# Patient Record
Sex: MALE | Race: WHITE | NOT HISPANIC OR LATINO | URBAN - METROPOLITAN AREA
[De-identification: names, ages, dates, MRNs, and addresses within clinical notes are randomized per-mention and may not be internally consistent; named-entity substitution may affect disease eponyms.]

---

## 2017-08-12 ENCOUNTER — INPATIENT (INPATIENT)
Facility: HOSPITAL | Age: 67
LOS: 15 days | Discharge: ANOTHER HOSPITAL | End: 2017-08-28
Attending: INTERNAL MEDICINE | Admitting: INTERNAL MEDICINE
Payer: COMMERCIAL

## 2017-08-12 VITALS
DIASTOLIC BLOOD PRESSURE: 73 MMHG | SYSTOLIC BLOOD PRESSURE: 126 MMHG | RESPIRATION RATE: 20 BRPM | OXYGEN SATURATION: 98 % | HEIGHT: 72 IN | HEART RATE: 128 BPM | TEMPERATURE: 104 F | WEIGHT: 184.09 LBS

## 2017-08-12 DIAGNOSIS — E11.9 TYPE 2 DIABETES MELLITUS WITHOUT COMPLICATIONS: ICD-10-CM

## 2017-08-12 DIAGNOSIS — R10.30 LOWER ABDOMINAL PAIN, UNSPECIFIED: ICD-10-CM

## 2017-08-12 DIAGNOSIS — A41.9 SEPSIS, UNSPECIFIED ORGANISM: ICD-10-CM

## 2017-08-12 DIAGNOSIS — I10 ESSENTIAL (PRIMARY) HYPERTENSION: ICD-10-CM

## 2017-08-12 DIAGNOSIS — I48.91 UNSPECIFIED ATRIAL FIBRILLATION: ICD-10-CM

## 2017-08-12 LAB
ALBUMIN SERPL ELPH-MCNC: 3.4 G/DL — SIGNIFICANT CHANGE UP (ref 3.3–5)
ALP SERPL-CCNC: 72 U/L — SIGNIFICANT CHANGE UP (ref 40–120)
ALT FLD-CCNC: 46 U/L — SIGNIFICANT CHANGE UP (ref 12–78)
ANION GAP SERPL CALC-SCNC: 11 MMOL/L — SIGNIFICANT CHANGE UP (ref 5–17)
APPEARANCE UR: CLEAR — SIGNIFICANT CHANGE UP
APTT BLD: 30.3 SEC — SIGNIFICANT CHANGE UP (ref 27.5–37.4)
AST SERPL-CCNC: 40 U/L — HIGH (ref 15–37)
BACTERIA # UR AUTO: ABNORMAL
BASOPHILS # BLD AUTO: 0.2 K/UL — SIGNIFICANT CHANGE UP (ref 0–0.2)
BASOPHILS NFR BLD AUTO: 1.7 % — SIGNIFICANT CHANGE UP (ref 0–2)
BILIRUB SERPL-MCNC: 1.1 MG/DL — SIGNIFICANT CHANGE UP (ref 0.2–1.2)
BILIRUB UR-MCNC: NEGATIVE — SIGNIFICANT CHANGE UP
BLD GP AB SCN SERPL QL: SIGNIFICANT CHANGE UP
BUN SERPL-MCNC: 23 MG/DL — SIGNIFICANT CHANGE UP (ref 7–23)
CALCIUM SERPL-MCNC: 8.7 MG/DL — SIGNIFICANT CHANGE UP (ref 8.5–10.1)
CHLORIDE SERPL-SCNC: 102 MMOL/L — SIGNIFICANT CHANGE UP (ref 96–108)
CO2 SERPL-SCNC: 23 MMOL/L — SIGNIFICANT CHANGE UP (ref 22–31)
COLOR SPEC: YELLOW — SIGNIFICANT CHANGE UP
CREAT SERPL-MCNC: 1.61 MG/DL — HIGH (ref 0.5–1.3)
DIFF PNL FLD: ABNORMAL
EOSINOPHIL # BLD AUTO: 0 K/UL — SIGNIFICANT CHANGE UP (ref 0–0.5)
EOSINOPHIL NFR BLD AUTO: 0 % — SIGNIFICANT CHANGE UP (ref 0–6)
EPI CELLS # UR: SIGNIFICANT CHANGE UP
GLUCOSE SERPL-MCNC: 188 MG/DL — HIGH (ref 70–99)
GLUCOSE UR QL: NEGATIVE MG/DL — SIGNIFICANT CHANGE UP
HCT VFR BLD CALC: 45.6 % — SIGNIFICANT CHANGE UP (ref 39–50)
HGB BLD-MCNC: 15.6 G/DL — SIGNIFICANT CHANGE UP (ref 13–17)
HYALINE CASTS # UR AUTO: ABNORMAL /LPF
INR BLD: 1.24 RATIO — HIGH (ref 0.88–1.16)
KETONES UR-MCNC: ABNORMAL
LACTATE SERPL-SCNC: 1.6 MMOL/L — SIGNIFICANT CHANGE UP (ref 0.7–2)
LACTATE SERPL-SCNC: 2.9 MMOL/L — HIGH (ref 0.7–2)
LEUKOCYTE ESTERASE UR-ACNC: ABNORMAL
LIDOCAIN IGE QN: 236 U/L — SIGNIFICANT CHANGE UP (ref 73–393)
LYMPHOCYTES # BLD AUTO: 1 K/UL — SIGNIFICANT CHANGE UP (ref 1–3.3)
LYMPHOCYTES # BLD AUTO: 9.3 % — LOW (ref 13–44)
MCHC RBC-ENTMCNC: 31.2 PG — SIGNIFICANT CHANGE UP (ref 27–34)
MCHC RBC-ENTMCNC: 34.2 GM/DL — SIGNIFICANT CHANGE UP (ref 32–36)
MCV RBC AUTO: 91.2 FL — SIGNIFICANT CHANGE UP (ref 80–100)
MONOCYTES # BLD AUTO: 0.9 K/UL — SIGNIFICANT CHANGE UP (ref 0–0.9)
MONOCYTES NFR BLD AUTO: 8.1 % — SIGNIFICANT CHANGE UP (ref 2–14)
NEUTROPHILS # BLD AUTO: 9 K/UL — HIGH (ref 1.8–7.4)
NEUTROPHILS NFR BLD AUTO: 80.8 % — HIGH (ref 43–77)
NITRITE UR-MCNC: NEGATIVE — SIGNIFICANT CHANGE UP
PH UR: 6 — SIGNIFICANT CHANGE UP (ref 5–8)
PLATELET # BLD AUTO: 234 K/UL — SIGNIFICANT CHANGE UP (ref 150–400)
POTASSIUM SERPL-MCNC: 4.2 MMOL/L — SIGNIFICANT CHANGE UP (ref 3.5–5.3)
POTASSIUM SERPL-SCNC: 4.2 MMOL/L — SIGNIFICANT CHANGE UP (ref 3.5–5.3)
PROT SERPL-MCNC: 7.7 GM/DL — SIGNIFICANT CHANGE UP (ref 6–8.3)
PROT UR-MCNC: 100 MG/DL
PROTHROM AB SERPL-ACNC: 13.6 SEC — HIGH (ref 9.8–12.7)
RBC # BLD: 5 M/UL — SIGNIFICANT CHANGE UP (ref 4.2–5.8)
RBC # FLD: 11.8 % — SIGNIFICANT CHANGE UP (ref 11–15)
RBC CASTS # UR COMP ASSIST: ABNORMAL /HPF (ref 0–4)
SODIUM SERPL-SCNC: 136 MMOL/L — SIGNIFICANT CHANGE UP (ref 135–145)
SP GR SPEC: 1.01 — SIGNIFICANT CHANGE UP (ref 1.01–1.02)
TROPONIN I SERPL-MCNC: 0.08 NG/ML — HIGH (ref 0.01–0.04)
TSH SERPL-MCNC: 0.92 UU/ML — SIGNIFICANT CHANGE UP (ref 0.36–3.74)
UROBILINOGEN FLD QL: 4 MG/DL
WBC # BLD: 11.1 K/UL — HIGH (ref 3.8–10.5)
WBC # FLD AUTO: 11.1 K/UL — HIGH (ref 3.8–10.5)
WBC UR QL: SIGNIFICANT CHANGE UP

## 2017-08-12 PROCEDURE — 71010: CPT | Mod: 26

## 2017-08-12 PROCEDURE — 93010 ELECTROCARDIOGRAM REPORT: CPT | Mod: 76

## 2017-08-12 PROCEDURE — 99284 EMERGENCY DEPT VISIT MOD MDM: CPT

## 2017-08-12 PROCEDURE — 74177 CT ABD & PELVIS W/CONTRAST: CPT | Mod: 26

## 2017-08-12 RX ORDER — HEPARIN SODIUM 5000 [USP'U]/ML
INJECTION INTRAVENOUS; SUBCUTANEOUS
Qty: 25000 | Refills: 0 | Status: DISCONTINUED | OUTPATIENT
Start: 2017-08-12 | End: 2017-08-16

## 2017-08-12 RX ORDER — DEXTROSE 50 % IN WATER 50 %
1 SYRINGE (ML) INTRAVENOUS ONCE
Qty: 0 | Refills: 0 | Status: DISCONTINUED | OUTPATIENT
Start: 2017-08-12 | End: 2017-08-28

## 2017-08-12 RX ORDER — PIPERACILLIN AND TAZOBACTAM 4; .5 G/20ML; G/20ML
3.38 INJECTION, POWDER, LYOPHILIZED, FOR SOLUTION INTRAVENOUS EVERY 8 HOURS
Qty: 0 | Refills: 0 | Status: DISCONTINUED | OUTPATIENT
Start: 2017-08-12 | End: 2017-08-13

## 2017-08-12 RX ORDER — DEXTROSE 50 % IN WATER 50 %
12.5 SYRINGE (ML) INTRAVENOUS ONCE
Qty: 0 | Refills: 0 | Status: DISCONTINUED | OUTPATIENT
Start: 2017-08-12 | End: 2017-08-28

## 2017-08-12 RX ORDER — GLUCAGON INJECTION, SOLUTION 0.5 MG/.1ML
1 INJECTION, SOLUTION SUBCUTANEOUS ONCE
Qty: 0 | Refills: 0 | Status: DISCONTINUED | OUTPATIENT
Start: 2017-08-12 | End: 2017-08-28

## 2017-08-12 RX ORDER — PIPERACILLIN AND TAZOBACTAM 4; .5 G/20ML; G/20ML
3.38 INJECTION, POWDER, LYOPHILIZED, FOR SOLUTION INTRAVENOUS ONCE
Qty: 0 | Refills: 0 | Status: COMPLETED | OUTPATIENT
Start: 2017-08-12 | End: 2017-08-12

## 2017-08-12 RX ORDER — SODIUM CHLORIDE 9 MG/ML
1000 INJECTION INTRAMUSCULAR; INTRAVENOUS; SUBCUTANEOUS ONCE
Qty: 0 | Refills: 0 | Status: COMPLETED | OUTPATIENT
Start: 2017-08-12 | End: 2017-08-12

## 2017-08-12 RX ORDER — ATENOLOL 25 MG/1
50 TABLET ORAL DAILY
Qty: 0 | Refills: 0 | Status: DISCONTINUED | OUTPATIENT
Start: 2017-08-12 | End: 2017-08-28

## 2017-08-12 RX ORDER — IOHEXOL 300 MG/ML
30 INJECTION, SOLUTION INTRAVENOUS ONCE
Qty: 0 | Refills: 0 | Status: COMPLETED | OUTPATIENT
Start: 2017-08-12 | End: 2017-08-12

## 2017-08-12 RX ORDER — HEPARIN SODIUM 5000 [USP'U]/ML
3000 INJECTION INTRAVENOUS; SUBCUTANEOUS EVERY 6 HOURS
Qty: 0 | Refills: 0 | Status: DISCONTINUED | OUTPATIENT
Start: 2017-08-12 | End: 2017-08-16

## 2017-08-12 RX ORDER — BUPROPION HYDROCHLORIDE 150 MG/1
150 TABLET, EXTENDED RELEASE ORAL DAILY
Qty: 0 | Refills: 0 | Status: DISCONTINUED | OUTPATIENT
Start: 2017-08-12 | End: 2017-08-14

## 2017-08-12 RX ORDER — VANCOMYCIN HCL 1 G
1000 VIAL (EA) INTRAVENOUS ONCE
Qty: 0 | Refills: 0 | Status: COMPLETED | OUTPATIENT
Start: 2017-08-12 | End: 2017-08-12

## 2017-08-12 RX ORDER — ASPIRIN/CALCIUM CARB/MAGNESIUM 324 MG
81 TABLET ORAL DAILY
Qty: 0 | Refills: 0 | Status: DISCONTINUED | OUTPATIENT
Start: 2017-08-13 | End: 2017-08-28

## 2017-08-12 RX ORDER — HEPARIN SODIUM 5000 [USP'U]/ML
6500 INJECTION INTRAVENOUS; SUBCUTANEOUS EVERY 6 HOURS
Qty: 0 | Refills: 0 | Status: DISCONTINUED | OUTPATIENT
Start: 2017-08-12 | End: 2017-08-16

## 2017-08-12 RX ORDER — ACETAMINOPHEN 500 MG
975 TABLET ORAL ONCE
Qty: 0 | Refills: 0 | Status: COMPLETED | OUTPATIENT
Start: 2017-08-12 | End: 2017-08-12

## 2017-08-12 RX ORDER — DEXTROSE 50 % IN WATER 50 %
25 SYRINGE (ML) INTRAVENOUS ONCE
Qty: 0 | Refills: 0 | Status: DISCONTINUED | OUTPATIENT
Start: 2017-08-12 | End: 2017-08-28

## 2017-08-12 RX ORDER — HEPARIN SODIUM 5000 [USP'U]/ML
6500 INJECTION INTRAVENOUS; SUBCUTANEOUS ONCE
Qty: 0 | Refills: 0 | Status: COMPLETED | OUTPATIENT
Start: 2017-08-12 | End: 2017-08-12

## 2017-08-12 RX ORDER — ASPIRIN/CALCIUM CARB/MAGNESIUM 324 MG
325 TABLET ORAL ONCE
Qty: 0 | Refills: 0 | Status: COMPLETED | OUTPATIENT
Start: 2017-08-12 | End: 2017-08-12

## 2017-08-12 RX ORDER — SODIUM CHLORIDE 9 MG/ML
1000 INJECTION, SOLUTION INTRAVENOUS
Qty: 0 | Refills: 0 | Status: DISCONTINUED | OUTPATIENT
Start: 2017-08-12 | End: 2017-08-28

## 2017-08-12 RX ORDER — SODIUM CHLORIDE 9 MG/ML
1000 INJECTION INTRAMUSCULAR; INTRAVENOUS; SUBCUTANEOUS
Qty: 0 | Refills: 0 | Status: DISCONTINUED | OUTPATIENT
Start: 2017-08-12 | End: 2017-08-19

## 2017-08-12 RX ORDER — INSULIN LISPRO 100/ML
VIAL (ML) SUBCUTANEOUS
Qty: 0 | Refills: 0 | Status: DISCONTINUED | OUTPATIENT
Start: 2017-08-12 | End: 2017-08-28

## 2017-08-12 RX ADMIN — SODIUM CHLORIDE 2000 MILLILITER(S): 9 INJECTION INTRAMUSCULAR; INTRAVENOUS; SUBCUTANEOUS at 10:06

## 2017-08-12 RX ADMIN — IOHEXOL 30 MILLILITER(S): 300 INJECTION, SOLUTION INTRAVENOUS at 10:43

## 2017-08-12 RX ADMIN — Medication 325 MILLIGRAM(S): at 15:43

## 2017-08-12 RX ADMIN — PIPERACILLIN AND TAZOBACTAM 25 GRAM(S): 4; .5 INJECTION, POWDER, LYOPHILIZED, FOR SOLUTION INTRAVENOUS at 22:46

## 2017-08-12 RX ADMIN — Medication 975 MILLIGRAM(S): at 15:35

## 2017-08-12 RX ADMIN — Medication 975 MILLIGRAM(S): at 10:05

## 2017-08-12 RX ADMIN — PIPERACILLIN AND TAZOBACTAM 200 GRAM(S): 4; .5 INJECTION, POWDER, LYOPHILIZED, FOR SOLUTION INTRAVENOUS at 10:43

## 2017-08-12 RX ADMIN — HEPARIN SODIUM 6500 UNIT(S): 5000 INJECTION INTRAVENOUS; SUBCUTANEOUS at 22:46

## 2017-08-12 RX ADMIN — Medication 250 MILLIGRAM(S): at 11:15

## 2017-08-12 RX ADMIN — HEPARIN SODIUM 1500 UNIT(S)/HR: 5000 INJECTION INTRAVENOUS; SUBCUTANEOUS at 22:44

## 2017-08-12 NOTE — ED PROVIDER NOTE - MEDICAL DECISION MAKING DETAILS
65 y/o M w fever/abd pain; sepsis alert; started on broad spectrum abx, IV fluids, ua/culture, CXR, lactate, CTAP to r/o intraabdominal source; also new onset of a.fib; will treat underlying infection/fluids to control heart rate, if still tachy, will give meds for rate control; 67 y/o M w fever/abd pain; sepsis alert; started on broad spectrum abx, IV fluids, ua/culture, CXR, lactate, CTAP to r/o intraabdominal source; also new onset of a.fib; will treat underlying infection/fluids to control heart rate, if still tachy, will give meds for rate control;    no clear source; diltiazem for rate control; ASA;

## 2017-08-12 NOTE — ED ADULT NURSE NOTE - OBJECTIVE STATEMENT
pt received alert and oriented x3, pt states he was having abdominal pain for approx 1 week, with fever and chills , pt sent from urgent care. pt currently denies abdominal pain now

## 2017-08-12 NOTE — H&P ADULT - HISTORY OF PRESENT ILLNESS
67 y/o M w hx of DM, htn, presents w fever and abd pain x 1 week; reports the pain is in bilateral lower quadrant; denies vomiting; denies diarrhea; denies urinary symptoms; + cough; denies CP/SOB; denies lower ext swelling; no headache or neck stiffness

## 2017-08-12 NOTE — ED PROVIDER NOTE - OBJECTIVE STATEMENT
Pertinent PMH/PSH/FHx/SHx and Review of Systems contained within:    67 y/o M w hx of DM, htn, presents w fever and abd pain x 1 week; reports the pain is in bilateral lower quadrant; denies vomiting; denies diarrhea; denies urinary symptoms; + cough; denies CP/SOB; denies lower ext swelling; no headache or neck stiffness    PMH: as above  meds: telmesartan; wellbutryin  allergies; nkda  SH; denies cig or drug use    EKG: a.fib 108

## 2017-08-12 NOTE — H&P ADULT - NSHPREVIEWOFSYSTEMS_GEN_ALL_CORE
REVIEW OF SYSTEMS:    CONSTITUTIONAL: No fever, weight loss, or fatigue  EYES: No eye pain, visual disturbances, or discharge  ENMT:  No difficulty hearing, tinnitus, vertigo; No sinus or throat pain  NECK: No pain or stiffness  BREASTS: No pain, masses, or nipple discharge  RESPIRATORY: No cough, wheezing, chills or hemoptysis; No shortness of breath  CARDIOVASCULAR: No chest pain, palpitations, dizziness, or leg swelling  GASTROINTESTINAL: Abdominal or epigastric pain. No nausea, vomiting, or hematemesis; No diarrhea or constipation. No melena or hematochezia.  GENITOURINARY: No dysuria, frequency, hematuria, or incontinence  NEUROLOGICAL: No headaches, memory loss, loss of strength, numbness, or tremors  SKIN: No itching, burning, rashes, or lesions   LYMPH NODES: No enlarged glands  ENDOCRINE: No heat or cold intolerance; No hair loss  MUSCULOSKELETAL: No joint pain or swelling; No muscle, back, or extremity pain  PSYCHIATRIC: No depression, anxiety, mood swings, or difficulty sleeping  HEME/LYMPH: No easy bruising, or bleeding gums  ALLERGY AND IMMUNOLOGIC: No hives or eczema

## 2017-08-12 NOTE — ED ADULT TRIAGE NOTE - CHIEF COMPLAINT QUOTE
Referred from urgent care center for fever of 102 degree F  and c/o abdominal pain   "right above the belly button" with nausea  x 1 week , getting worse

## 2017-08-12 NOTE — ED PROVIDER NOTE - PHYSICAL EXAMINATION
Gen: no acute distress  HEENT: clear oropharynx; EOMI; dry mucous membranes  neck: no nuchal rigidity  CV: irregular; no murmur  lungs: CTAB; no w/r/r  abd: soft, nd, bilateral lower abd ttp; RLQ > LLQ; no rebound or guarding; no cva ttp  ext: wwp; full ROM  neuro: no focal deficits  skin; no rash

## 2017-08-12 NOTE — H&P ADULT - NSHPLABSRESULTS_GEN_ALL_CORE
CBC Full  -  ( 12 Aug 2017 10:51 )  WBC Count : 11.1 K/uL  Hemoglobin : 15.6 g/dL  Hematocrit : 45.6 %  Platelet Count - Automated : 234 K/uL  Mean Cell Volume : 91.2 fl  Mean Cell Hemoglobin : 31.2 pg  Mean Cell Hemoglobin Concentration : 34.2 gm/dL  Auto Neutrophil # : 9.0 K/uL  Auto Lymphocyte # : 1.0 K/uL  Auto Monocyte # : 0.9 K/uL  Auto Eosinophil # : 0.0 K/uL  Auto Basophil # : 0.2 K/uL  Auto Neutrophil % : 80.8 %  Auto Lymphocyte % : 9.3 %  Auto Monocyte % : 8.1 %  Auto Eosinophil % : 0.0 %  Auto Basophil % : 1.7 %  12 Aug 2017 10:51    136    |  102    |  23     ----------------------------<  188    4.2     |  23     |  1.61     Ca    8.7        12 Aug 2017 10:51    TPro  7.7    /  Alb  3.4    /  TBili  1.1    /  DBili  x      /  AST  40     /  ALT  46     /  AlkPhos  72     12 Aug 2017 10:51

## 2017-08-12 NOTE — H&P ADULT - ASSESSMENT
66 year old man with history of hypertension and diabetes mellitus seen in the emergency with and being admitted for fever of one weeks duration and new onset atrial fibrillation and abdominal pain.  Cat scan iof abdomen only showed a non obstructing left intrarenal stone.

## 2017-08-13 DIAGNOSIS — A41.9 SEPSIS, UNSPECIFIED ORGANISM: ICD-10-CM

## 2017-08-13 DIAGNOSIS — I63.9 CEREBRAL INFARCTION, UNSPECIFIED: ICD-10-CM

## 2017-08-13 DIAGNOSIS — I21.4 NON-ST ELEVATION (NSTEMI) MYOCARDIAL INFARCTION: ICD-10-CM

## 2017-08-13 LAB
ALBUMIN SERPL ELPH-MCNC: 2.9 G/DL — LOW (ref 3.3–5)
ALP SERPL-CCNC: 50 U/L — SIGNIFICANT CHANGE UP (ref 40–120)
ALT FLD-CCNC: 43 U/L — SIGNIFICANT CHANGE UP (ref 12–78)
ANION GAP SERPL CALC-SCNC: 11 MMOL/L — SIGNIFICANT CHANGE UP (ref 5–17)
APTT BLD: 128.7 SEC — CRITICAL HIGH (ref 27.5–37.4)
APTT BLD: 53.5 SEC — HIGH (ref 27.5–37.4)
APTT BLD: 57.2 SEC — HIGH (ref 27.5–37.4)
AST SERPL-CCNC: 77 U/L — HIGH (ref 15–37)
BASE EXCESS BLDA CALC-SCNC: -3 MMOL/L — LOW (ref -2–2)
BASOPHILS # BLD AUTO: 0.3 K/UL — HIGH (ref 0–0.2)
BASOPHILS NFR BLD AUTO: 1.5 % — SIGNIFICANT CHANGE UP (ref 0–2)
BILIRUB SERPL-MCNC: 1 MG/DL — SIGNIFICANT CHANGE UP (ref 0.2–1.2)
BLD GP AB SCN SERPL QL: SIGNIFICANT CHANGE UP
BLOOD GAS COMMENTS: SIGNIFICANT CHANGE UP
BLOOD GAS COMMENTS: SIGNIFICANT CHANGE UP
BLOOD GAS SOURCE: SIGNIFICANT CHANGE UP
BUN SERPL-MCNC: 22 MG/DL — SIGNIFICANT CHANGE UP (ref 7–23)
CALCIUM SERPL-MCNC: 7.6 MG/DL — LOW (ref 8.5–10.1)
CHLORIDE SERPL-SCNC: 108 MMOL/L — SIGNIFICANT CHANGE UP (ref 96–108)
CK MB BLD-MCNC: 0.1 % — SIGNIFICANT CHANGE UP (ref 0–3.5)
CK MB CFR SERPL CALC: 5 NG/ML — HIGH (ref 0.5–3.6)
CK SERPL-CCNC: 3828 U/L — HIGH (ref 26–308)
CK SERPL-CCNC: 4163 U/L — HIGH (ref 26–308)
CO2 SERPL-SCNC: 22 MMOL/L — SIGNIFICANT CHANGE UP (ref 22–31)
CREAT SERPL-MCNC: 1.39 MG/DL — HIGH (ref 0.5–1.3)
CULTURE RESULTS: NO GROWTH — SIGNIFICANT CHANGE UP
EOSINOPHIL # BLD AUTO: 0 K/UL — SIGNIFICANT CHANGE UP (ref 0–0.5)
EOSINOPHIL NFR BLD AUTO: 0 % — SIGNIFICANT CHANGE UP (ref 0–6)
GLUCOSE SERPL-MCNC: 137 MG/DL — HIGH (ref 70–99)
HBA1C BLD-MCNC: 6.4 % — HIGH (ref 4–5.6)
HCO3 BLDA-SCNC: 18 MMOL/L — LOW (ref 21–29)
HCT VFR BLD CALC: 35.9 % — LOW (ref 39–50)
HCT VFR BLD CALC: 37.7 % — LOW (ref 39–50)
HGB BLD-MCNC: 12.1 G/DL — LOW (ref 13–17)
HGB BLD-MCNC: 12.6 G/DL — LOW (ref 13–17)
HOROWITZ INDEX BLDA+IHG-RTO: 21 — SIGNIFICANT CHANGE UP
INR BLD: 1.26 RATIO — HIGH (ref 0.88–1.16)
LACTATE SERPL-SCNC: 1.5 MMOL/L — SIGNIFICANT CHANGE UP (ref 0.7–2)
LACTATE SERPL-SCNC: 2.2 MMOL/L — HIGH (ref 0.7–2)
LYMPHOCYTES # BLD AUTO: 0.8 K/UL — LOW (ref 1–3.3)
LYMPHOCYTES # BLD AUTO: 4.3 % — LOW (ref 13–44)
MCHC RBC-ENTMCNC: 30.3 PG — SIGNIFICANT CHANGE UP (ref 27–34)
MCHC RBC-ENTMCNC: 30.4 PG — SIGNIFICANT CHANGE UP (ref 27–34)
MCHC RBC-ENTMCNC: 33.5 GM/DL — SIGNIFICANT CHANGE UP (ref 32–36)
MCHC RBC-ENTMCNC: 33.6 GM/DL — SIGNIFICANT CHANGE UP (ref 32–36)
MCV RBC AUTO: 90.4 FL — SIGNIFICANT CHANGE UP (ref 80–100)
MCV RBC AUTO: 90.5 FL — SIGNIFICANT CHANGE UP (ref 80–100)
MONOCYTES # BLD AUTO: 1.5 K/UL — HIGH (ref 0–0.9)
MONOCYTES NFR BLD AUTO: 8.4 % — SIGNIFICANT CHANGE UP (ref 2–14)
NEUTROPHILS # BLD AUTO: 15.1 K/UL — HIGH (ref 1.8–7.4)
NEUTROPHILS NFR BLD AUTO: 85.8 % — HIGH (ref 43–77)
PCO2 BLDA: 22 MMHG — LOW (ref 32–46)
PH BLD: 7.52 — HIGH (ref 7.35–7.45)
PLATELET # BLD AUTO: 189 K/UL — SIGNIFICANT CHANGE UP (ref 150–400)
PLATELET # BLD AUTO: 208 K/UL — SIGNIFICANT CHANGE UP (ref 150–400)
PO2 BLDA: 76 MMHG — SIGNIFICANT CHANGE UP (ref 74–108)
POTASSIUM SERPL-MCNC: 3.7 MMOL/L — SIGNIFICANT CHANGE UP (ref 3.5–5.3)
POTASSIUM SERPL-SCNC: 3.7 MMOL/L — SIGNIFICANT CHANGE UP (ref 3.5–5.3)
PROT SERPL-MCNC: 6.5 GM/DL — SIGNIFICANT CHANGE UP (ref 6–8.3)
PROTHROM AB SERPL-ACNC: 13.8 SEC — HIGH (ref 9.8–12.7)
RBC # BLD: 3.97 M/UL — LOW (ref 4.2–5.8)
RBC # BLD: 4.17 M/UL — LOW (ref 4.2–5.8)
RBC # FLD: 11.6 % — SIGNIFICANT CHANGE UP (ref 11–15)
RBC # FLD: 11.7 % — SIGNIFICANT CHANGE UP (ref 11–15)
SAO2 % BLDA: 96 % — SIGNIFICANT CHANGE UP (ref 92–96)
SODIUM SERPL-SCNC: 141 MMOL/L — SIGNIFICANT CHANGE UP (ref 135–145)
SPECIMEN SOURCE: SIGNIFICANT CHANGE UP
TROPONIN I SERPL-MCNC: 0.32 NG/ML — HIGH (ref 0.01–0.04)
TROPONIN I SERPL-MCNC: 0.39 NG/ML — HIGH (ref 0.01–0.04)
WBC # BLD: 14.1 K/UL — HIGH (ref 3.8–10.5)
WBC # BLD: 17.6 K/UL — HIGH (ref 3.8–10.5)
WBC # FLD AUTO: 14.1 K/UL — HIGH (ref 3.8–10.5)
WBC # FLD AUTO: 17.6 K/UL — HIGH (ref 3.8–10.5)

## 2017-08-13 PROCEDURE — 70450 CT HEAD/BRAIN W/O DYE: CPT | Mod: 26,59

## 2017-08-13 PROCEDURE — 70498 CT ANGIOGRAPHY NECK: CPT | Mod: 26

## 2017-08-13 PROCEDURE — 70496 CT ANGIOGRAPHY HEAD: CPT | Mod: 26

## 2017-08-13 PROCEDURE — 71010: CPT | Mod: 26

## 2017-08-13 RX ORDER — MEROPENEM 1 G/30ML
1000 INJECTION INTRAVENOUS ONCE
Qty: 0 | Refills: 0 | Status: COMPLETED | OUTPATIENT
Start: 2017-08-13 | End: 2017-08-13

## 2017-08-13 RX ORDER — ALPRAZOLAM 0.25 MG
0.25 TABLET ORAL ONCE
Qty: 0 | Refills: 0 | Status: DISCONTINUED | OUTPATIENT
Start: 2017-08-13 | End: 2017-08-13

## 2017-08-13 RX ORDER — MEROPENEM 1 G/30ML
INJECTION INTRAVENOUS
Qty: 0 | Refills: 0 | Status: DISCONTINUED | OUTPATIENT
Start: 2017-08-13 | End: 2017-08-14

## 2017-08-13 RX ORDER — ACETAMINOPHEN 500 MG
650 TABLET ORAL EVERY 6 HOURS
Qty: 0 | Refills: 0 | Status: DISCONTINUED | OUTPATIENT
Start: 2017-08-13 | End: 2017-08-28

## 2017-08-13 RX ORDER — OXYCODONE AND ACETAMINOPHEN 5; 325 MG/1; MG/1
1 TABLET ORAL EVERY 6 HOURS
Qty: 0 | Refills: 0 | Status: DISCONTINUED | OUTPATIENT
Start: 2017-08-13 | End: 2017-08-13

## 2017-08-13 RX ORDER — ACETAMINOPHEN 500 MG
650 TABLET ORAL ONCE
Qty: 0 | Refills: 0 | Status: COMPLETED | OUTPATIENT
Start: 2017-08-13 | End: 2017-08-13

## 2017-08-13 RX ORDER — LIDOCAINE 4 G/100G
1 CREAM TOPICAL DAILY
Qty: 0 | Refills: 0 | Status: DISCONTINUED | OUTPATIENT
Start: 2017-08-13 | End: 2017-08-28

## 2017-08-13 RX ORDER — MEROPENEM 1 G/30ML
1000 INJECTION INTRAVENOUS EVERY 8 HOURS
Qty: 0 | Refills: 0 | Status: DISCONTINUED | OUTPATIENT
Start: 2017-08-13 | End: 2017-08-14

## 2017-08-13 RX ORDER — VANCOMYCIN HCL 1 G
1000 VIAL (EA) INTRAVENOUS ONCE
Qty: 0 | Refills: 0 | Status: COMPLETED | OUTPATIENT
Start: 2017-08-13 | End: 2017-08-13

## 2017-08-13 RX ORDER — ACETAMINOPHEN 500 MG
325 TABLET ORAL EVERY 4 HOURS
Qty: 0 | Refills: 0 | Status: DISCONTINUED | OUTPATIENT
Start: 2017-08-13 | End: 2017-08-13

## 2017-08-13 RX ORDER — ACETAMINOPHEN 500 MG
325 TABLET ORAL ONCE
Qty: 0 | Refills: 0 | Status: COMPLETED | OUTPATIENT
Start: 2017-08-13 | End: 2017-08-13

## 2017-08-13 RX ORDER — VANCOMYCIN HCL 1 G
1000 VIAL (EA) INTRAVENOUS EVERY 12 HOURS
Qty: 0 | Refills: 0 | Status: DISCONTINUED | OUTPATIENT
Start: 2017-08-14 | End: 2017-08-14

## 2017-08-13 RX ORDER — SODIUM CHLORIDE 9 MG/ML
1000 INJECTION INTRAMUSCULAR; INTRAVENOUS; SUBCUTANEOUS ONCE
Qty: 0 | Refills: 0 | Status: COMPLETED | OUTPATIENT
Start: 2017-08-13 | End: 2017-08-13

## 2017-08-13 RX ADMIN — Medication 250 MILLIGRAM(S): at 15:38

## 2017-08-13 RX ADMIN — OXYCODONE AND ACETAMINOPHEN 1 TABLET(S): 5; 325 TABLET ORAL at 21:51

## 2017-08-13 RX ADMIN — PIPERACILLIN AND TAZOBACTAM 25 GRAM(S): 4; .5 INJECTION, POWDER, LYOPHILIZED, FOR SOLUTION INTRAVENOUS at 06:37

## 2017-08-13 RX ADMIN — MEROPENEM 200 MILLIGRAM(S): 1 INJECTION INTRAVENOUS at 16:40

## 2017-08-13 RX ADMIN — Medication 1: at 08:15

## 2017-08-13 RX ADMIN — LIDOCAINE 1 PATCH: 4 CREAM TOPICAL at 15:38

## 2017-08-13 RX ADMIN — Medication 1: at 11:45

## 2017-08-13 RX ADMIN — Medication 81 MILLIGRAM(S): at 11:47

## 2017-08-13 RX ADMIN — BUPROPION HYDROCHLORIDE 150 MILLIGRAM(S): 150 TABLET, EXTENDED RELEASE ORAL at 05:27

## 2017-08-13 RX ADMIN — HEPARIN SODIUM 3000 UNIT(S): 5000 INJECTION INTRAVENOUS; SUBCUTANEOUS at 23:52

## 2017-08-13 RX ADMIN — SODIUM CHLORIDE 2000 MILLILITER(S): 9 INJECTION INTRAMUSCULAR; INTRAVENOUS; SUBCUTANEOUS at 15:34

## 2017-08-13 RX ADMIN — Medication 325 MILLIGRAM(S): at 16:21

## 2017-08-13 RX ADMIN — Medication 0.25 MILLIGRAM(S): at 17:35

## 2017-08-13 RX ADMIN — HEPARIN SODIUM 1200 UNIT(S)/HR: 5000 INJECTION INTRAVENOUS; SUBCUTANEOUS at 10:22

## 2017-08-13 RX ADMIN — HEPARIN SODIUM 1400 UNIT(S)/HR: 5000 INJECTION INTRAVENOUS; SUBCUTANEOUS at 23:47

## 2017-08-13 RX ADMIN — ATENOLOL 50 MILLIGRAM(S): 25 TABLET ORAL at 05:27

## 2017-08-13 RX ADMIN — HEPARIN SODIUM 0 UNIT(S)/HR: 5000 INJECTION INTRAVENOUS; SUBCUTANEOUS at 08:17

## 2017-08-13 RX ADMIN — Medication 650 MILLIGRAM(S): at 05:15

## 2017-08-13 RX ADMIN — OXYCODONE AND ACETAMINOPHEN 1 TABLET(S): 5; 325 TABLET ORAL at 22:30

## 2017-08-13 RX ADMIN — Medication 1: at 16:21

## 2017-08-13 RX ADMIN — Medication 325 MILLIGRAM(S): at 15:38

## 2017-08-13 RX ADMIN — Medication 650 MILLIGRAM(S): at 04:39

## 2017-08-13 RX ADMIN — MEROPENEM 200 MILLIGRAM(S): 1 INJECTION INTRAVENOUS at 21:47

## 2017-08-13 NOTE — PROGRESS NOTE ADULT - SUBJECTIVE AND OBJECTIVE BOX
INTERVAL HPI/OVERNIGHT EVENTS:    interval events noted.  Neurology and ID notes appreciated. CTA report noted.      Antimicrobial:  meropenem IVPB   IV Intermittent   meropenem IVPB 1000 milliGRAM(s) IV Intermittent every 8 hours    Cardiovascular:  ATENolol  Tablet 50 milliGRAM(s) Oral daily    Pulmonary:    Hematalogic:  aspirin enteric coated 81 milliGRAM(s) Oral daily  heparin  Infusion.  Unit(s)/Hr IV Continuous <Continuous>  heparin  Injectable 6500 Unit(s) IV Push every 6 hours PRN  heparin  Injectable 3000 Unit(s) IV Push every 6 hours PRN    Other:  buPROPion XL . 150 milliGRAM(s) Oral daily  insulin lispro (HumaLOG) corrective regimen sliding scale   SubCutaneous three times a day before meals  dextrose 5%. 1000 milliLiter(s) IV Continuous <Continuous>  dextrose Gel 1 Dose(s) Oral once PRN  dextrose 50% Injectable 12.5 Gram(s) IV Push once  dextrose 50% Injectable 25 Gram(s) IV Push once  dextrose 50% Injectable 25 Gram(s) IV Push once  glucagon  Injectable 1 milliGRAM(s) IntraMuscular once PRN  sodium chloride 0.9%. 1000 milliLiter(s) IV Continuous <Continuous>  oxyCODONE    5 mG/acetaminophen 325 mG 1 Tablet(s) Oral every 6 hours PRN  lidocaine   Patch 1 Patch Transdermal daily  acetaminophen   Tablet 650 milliGRAM(s) Oral every 6 hours PRN    aspirin enteric coated 81 milliGRAM(s) Oral daily  buPROPion XL . 150 milliGRAM(s) Oral daily  ATENolol  Tablet 50 milliGRAM(s) Oral daily  insulin lispro (HumaLOG) corrective regimen sliding scale   SubCutaneous three times a day before meals  dextrose 5%. 1000 milliLiter(s) IV Continuous <Continuous>  dextrose Gel 1 Dose(s) Oral once PRN  dextrose 50% Injectable 12.5 Gram(s) IV Push once  dextrose 50% Injectable 25 Gram(s) IV Push once  dextrose 50% Injectable 25 Gram(s) IV Push once  glucagon  Injectable 1 milliGRAM(s) IntraMuscular once PRN  sodium chloride 0.9%. 1000 milliLiter(s) IV Continuous <Continuous>  heparin  Infusion.  Unit(s)/Hr IV Continuous <Continuous>  heparin  Injectable 6500 Unit(s) IV Push every 6 hours PRN  heparin  Injectable 3000 Unit(s) IV Push every 6 hours PRN  oxyCODONE    5 mG/acetaminophen 325 mG 1 Tablet(s) Oral every 6 hours PRN  lidocaine   Patch 1 Patch Transdermal daily  meropenem IVPB   IV Intermittent   meropenem IVPB 1000 milliGRAM(s) IV Intermittent every 8 hours  acetaminophen   Tablet 650 milliGRAM(s) Oral every 6 hours PRN    Drug Dosing Weight  Height (cm): 182.88 (12 Aug 2017 10:02)  Weight (kg): 83.9 (12 Aug 2017 18:00)  BMI (kg/m2): 25.1 (12 Aug 2017 18:00)  BSA (m2): 2.06 (12 Aug 2017 18:00)        SCALES: [ ] YES [ ] NO    DATE INSERTED:  REMOVE:  [ ] YES [ ] NO  EXPLAIN:      PAST MEDICAL & SURGICAL HISTORY:  Diabetes  HTN (hypertension)      REVIEW OF SYSTEMS:    CONSTITUTIONAL: No fever, weight loss, or fatigue  EYES: No eye pain, visual disturbances, or discharge  ENMT:  No difficulty hearing, tinnitus, vertigo; No sinus or throat pain  NECK: No pain or stiffness  BREASTS: No pain, masses, or nipple discharge  RESPIRATORY: No cough, wheezing, chills or hemoptysis; No shortness of breath  CARDIOVASCULAR: No chest pain, palpitations, dizziness, or leg swelling  GASTROINTESTINAL: No abdominal or epigastric pain. No nausea, vomiting, or hematemesis; No diarrhea or constipation. No melena or hematochezia.  GENITOURINARY: No dysuria, frequency, hematuria, or incontinence  NEUROLOGICAL: No headaches, memory loss, loss of strength, numbness, or tremors  SKIN: No itching, burning, rashes, or lesions   LYMPH NODES: No enlarged glands  ENDOCRINE: No heat or cold intolerance; No hair loss  MUSCULOSKELETAL: No joint pain or swelling; No muscle, back, or extremity pain  PSYCHIATRIC: No depression, anxiety, mood swings, or difficulty sleeping  HEME/LYMPH: No easy bruising, or bleeding gums  ALLERGY AND IMMUNOLOGIC: No hives or eczema      T(C): 37.7 (17 @ 17:05), Max: 37.8 (17 @ 11:47)  HR: 68 (17 @ 17:05) (68 - 87)  BP: 114/66 (17 @ 17:05) (114/66 - 136/68)  RR: 18 (17 @ 17:05) (18 - 18)  SpO2: 98% (17 @ 17:05) (95% - 98%)  Wt(kg): --    ABG - ( 13 Aug 2017 15:22 )  pH: x     /  pCO2: 22    /  pO2: 76    / HCO3: 18    / Base Excess: -3.0  /  SaO2: 96                  I&O's Detail        PHYSICAL EXAM:    GENERAL: NAD, well-groomed, well-developed  HEAD:  Atraumatic, Normocephalic  EYES: EOMI, PERRLA, conjunctiva and sclera clear  ENMT: No tonsillar erythema, exudates, or enlargement; Moist mucous membranes, Good dentition, No lesions  NECK: Supple, No JVD, Normal thyroid  NERVOUS SYSTEM:  Alert & Oriented X3, Good concentration; Motor Strength 5/5 B/L upper and lower extremities; DTRs 2+ intact and symmetric  CHEST/LUNG: Clear to percussion bilaterally; No rales, rhonchi, wheezing, or rubs  HEART: Regular rate and rhythm; No murmurs, rubs, or gallops  ABDOMEN: Soft, Nontender, Nondistended; Bowel sounds present  EXTREMITIES:  2+ Peripheral Pulses, No clubbing, cyanosis, or edema  LYMPH: No lymphadenopathy noted  SKIN: No rashes or lesions      LABS:  CBC Full  -  ( 13 Aug 2017 13:41 )  WBC Count : 17.6 K/uL  Hemoglobin : 12.1 g/dL  Hematocrit : 35.9 %  Platelet Count - Automated : 208 K/uL  Mean Cell Volume : 90.5 fl  Mean Cell Hemoglobin : 30.4 pg  Mean Cell Hemoglobin Concentration : 33.6 gm/dL  Auto Neutrophil # : 15.1 K/uL  Auto Lymphocyte # : 0.8 K/uL  Auto Monocyte # : 1.5 K/uL  Auto Eosinophil # : 0.0 K/uL  Auto Basophil # : 0.3 K/uL  Auto Neutrophil % : 85.8 %  Auto Lymphocyte % : 4.3 %  Auto Monocyte % : 8.4 %  Auto Eosinophil % : 0.0 %  Auto Basophil % : 1.5 %        141  |  108  |  22  ----------------------------<  137<H>  3.7   |  22  |  1.39<H>    Ca    7.6<L>      13 Aug 2017 13:41    TPro  6.5  /  Alb  2.9<L>  /  TBili  1.0  /  DBili  x   /  AST  77<H>  /  ALT  43  /  AlkPhos  50  08-13    PT/INR - ( 13 Aug 2017 13:41 )   PT: 13.8 sec;   INR: 1.26 ratio         PTT - ( 13 Aug 2017 13:41 )  PTT:57.2 sec  Urinalysis Basic - ( 12 Aug 2017 11:09 )    Color: Yellow / Appearance: Clear / S.010 / pH: x  Gluc: x / Ketone: Moderate  / Bili: Negative / Urobili: 4 mg/dL   Blood: x / Protein: 100 mg/dL / Nitrite: Negative   Leuk Esterase: Trace / RBC: 3-5 /HPF / WBC 0-2   Sq Epi: x / Non Sq Epi: x / Bacteria: Moderate          RADIOLOGY & ADDITIONAL STUDIES:

## 2017-08-13 NOTE — CONSULT NOTE ADULT - SUBJECTIVE AND OBJECTIVE BOX
HPI:  67 y/o M w hx of DM, htn, presents w fever and abd pain x 1 week; reports the pain is in bilateral lower quadrant; denies vomiting; denies diarrhea; denies urinary symptoms; + cough; denies CP/SOB; denies lower ext swelling; no headache or neck stiffness (12 Aug 2017 15:51)      PAST MEDICAL & SURGICAL HISTORY:  Diabetes  HTN (hypertension)      SOCHX:   tobacco,  -  alcohol    FMHX: FA/MO  - contributory       Recent Travel:    Immunizations:    Allergies    No Known Allergies    Intolerances        MEDICATIONS  (STANDING):  aspirin enteric coated 81 milliGRAM(s) Oral daily  buPROPion XL . 150 milliGRAM(s) Oral daily  ATENolol  Tablet 50 milliGRAM(s) Oral daily  insulin lispro (HumaLOG) corrective regimen sliding scale   SubCutaneous three times a day before meals  dextrose 5%. 1000 milliLiter(s) (50 mL/Hr) IV Continuous <Continuous>  dextrose 50% Injectable 12.5 Gram(s) IV Push once  dextrose 50% Injectable 25 Gram(s) IV Push once  dextrose 50% Injectable 25 Gram(s) IV Push once  sodium chloride 0.9%. 1000 milliLiter(s) (100 mL/Hr) IV Continuous <Continuous>  heparin  Infusion.  Unit(s)/Hr (15 mL/Hr) IV Continuous <Continuous>  lidocaine   Patch 1 Patch Transdermal daily  meropenem IVPB   IV Intermittent   meropenem IVPB 1000 milliGRAM(s) IV Intermittent every 8 hours    MEDICATIONS  (PRN):  dextrose Gel 1 Dose(s) Oral once PRN Blood Glucose LESS THAN 70 milliGRAM(s)/deciliter  glucagon  Injectable 1 milliGRAM(s) IntraMuscular once PRN Glucose LESS THAN 70 milligrams/deciliter  heparin  Injectable 6500 Unit(s) IV Push every 6 hours PRN For aPTT less than 40  heparin  Injectable 3000 Unit(s) IV Push every 6 hours PRN For aPTT between 40 - 57  oxyCODONE    5 mG/acetaminophen 325 mG 1 Tablet(s) Oral every 6 hours PRN Moderate Pain (4 - 6)  acetaminophen   Tablet 650 milliGRAM(s) Oral every 6 hours PRN For Temp greater than 38 C (100.4 F)      REVIEW OF SYSTEMS:  CONSTITUTIONAL: No fever, weight loss, or fatigue  EYES: No eye pain, visual disturbances, or discharge  ENMT:  No difficulty hearing, tinnitus, vertigo; No sinus or throat pain  NECK: No pain or stiffness  BREASTS: No pain, masses, or nipple discharge  RESPIRATORY: No cough, wheezing, chills or hemoptysis; No shortness of breath  CARDIOVASCULAR: No chest pain, palpitations, dizziness, or leg swelling  GASTROINTESTINAL: No abdominal or epigastric pain. No nausea, vomiting, or hematemesis; No diarrhea or constipation. No melena or hematochezia.  GENITOURINARY: No dysuria, frequency, hematuria, or incontinence  NEUROLOGICAL: No headaches, memory loss, loss of strength, numbness, or tremors  SKIN: No itching, burning, rashes, or lesions   LYMPH NODES: No enlarged glands  ENDOCRINE: No heat or cold intolerance; No hair loss  MUSCULOSKELETAL: No joint pain or swelling; No muscle, back, or extremity pain  PSYCHIATRIC: No depression, anxiety, mood swings, or difficulty sleeping  HEME/LYMPH: No easy bruising, or bleeding gums  ALLERGY AND IMMUNOLOGIC: No hives or eczema    VITAL SIGNS:    T(C): 37.7 (17 @ 17:05), Max: 37.8 (17 @ 11:47)  T(F): 99.8 (17 @ 17:05), Max: 100 (17 @ 11:47)  HR: 68 (17 @ 17:05) (68 - 94)  BP: 114/66 (17 @ 17:05) (114/66 - 136/68)  RR: 18 (17 @ 17:05) (18 - 18)  SpO2: 98% (17 @ 17:05) (95% - 98%)    PHYSICAL EXAM:    GENERAL: NAD, well-groomed, well-developed  HEAD:  Atraumatic, Normocephalic  EYES: EOMI, PERRLA, conjunctiva and sclera clear  ENMT: No tonsillar erythema, exudates, or enlargement; Moist mucous membranes, Good dentition, No lesions  NECK: Supple, No JVD, Normal thyroid  NERVOUS SYSTEM:  Alert & Oriented X3, Good concentration; Motor Strength 5/5 B/L upper and lower extremities; DTRs 2+ intact and symmetric  CHEST/LUNG: Clear to percussion bilaterally; No rales, rhonchi, wheezing bilaterally  HEART: Regular rate and rhythm; No murmurs, rubs, or gallops  ABDOMEN: Soft, Nontender, Nondistended; Bowel sounds present  EXTREMITIES:  2+ Peripheral Pulses, No clubbing, cyanosis, or edema  LYMPH: No lymphadenopathy noted  SKIN: No rashes or lesions  BACK: no pressor sore     LABS:                         12.1   17.6  )-----------( 208      ( 13 Aug 2017 13:41 )             35.9         141  |  108  |  22  ----------------------------<  137<H>  3.7   |  22  |  1.39<H>    Ca    7.6<L>      13 Aug 2017 13:41    TPro  6.5  /  Alb  2.9<L>  /  TBili  1.0  /  DBili  x   /  AST  77<H>  /  ALT  43  /  AlkPhos  50      LIVER FUNCTIONS - ( 13 Aug 2017 13:41 )  Alb: 2.9 g/dL / Pro: 6.5 gm/dL / ALK PHOS: 50 U/L / ALT: 43 U/L / AST: 77 U/L / GGT: x           PT/INR - ( 13 Aug 2017 13:41 )   PT: 13.8 sec;   INR: 1.26 ratio         PTT - ( 13 Aug 2017 13:41 )  PTT:57.2 sec  Urinalysis Basic - ( 12 Aug 2017 11:09 )    Color: Yellow / Appearance: Clear / S.010 / pH: x  Gluc: x / Ketone: Moderate  / Bili: Negative / Urobili: 4 mg/dL   Blood: x / Protein: 100 mg/dL / Nitrite: Negative   Leuk Esterase: Trace / RBC: 3-5 /HPF / WBC 0-2   Sq Epi: x / Non Sq Epi: x / Bacteria: Moderate      ABG - ( 13 Aug 2017 15:22 )  pH: x     /  pCO2: 22    /  pO2: 76    / HCO3: 18    / Base Excess: -3.0  /  SaO2: 96                CARDIAC MARKERS ( 13 Aug 2017 13:41 )  .319 ng/mL / x     / 3828 U/L / x     / 5.0 ng/mL  CARDIAC MARKERS ( 12 Aug 2017 22:04 )  .081 ng/mL / x     / x     / x     / x          Thyroid Stimulating Hormone, Serum: 0.922 uU/mL ( @ 10:51)                              Radiology: 65 y/o M w hx of DM, htn, presents w fever and abd pain x 1 week; reports the pain is in bilateral lower quadrant; denies vomiting; denies diarrhea; denies urinary symptoms; + cough; denies CP/SOB; denies lower ext swelling; no headache or neck stiffness (12 Aug 2017 15:51)  seen and discussed with wife by bedside   for me alert and oriented x3 in nad   has been agiitated and confused before  all of a suddn unable to move left arm and also feels weak left leg     PAST MEDICAL & SURGICAL HISTORY:  Diabetes  HTN (hypertension)  not hospitalized for years     SOCHX:   no tobacco,  no-  alcohol    FMHX: FA/MO  -non contributory       Recent Travel:    Immunizations:    Allergies    No Known Allergies    Intolerances        MEDICATIONS  (STANDING):  aspirin enteric coated 81 milliGRAM(s) Oral daily  buPROPion XL . 150 milliGRAM(s) Oral daily  ATENolol  Tablet 50 milliGRAM(s) Oral daily  insulin lispro (HumaLOG) corrective regimen sliding scale   SubCutaneous three times a day before meals  dextrose 5%. 1000 milliLiter(s) (50 mL/Hr) IV Continuous <Continuous>  dextrose 50% Injectable 12.5 Gram(s) IV Push once  dextrose 50% Injectable 25 Gram(s) IV Push once  dextrose 50% Injectable 25 Gram(s) IV Push once  sodium chloride 0.9%. 1000 milliLiter(s) (100 mL/Hr) IV Continuous <Continuous>  heparin  Infusion.  Unit(s)/Hr (15 mL/Hr) IV Continuous <Continuous>  lidocaine   Patch 1 Patch Transdermal daily  meropenem IVPB   IV Intermittent   meropenem IVPB 1000 milliGRAM(s) IV Intermittent every 8 hours    MEDICATIONS  (PRN):  dextrose Gel 1 Dose(s) Oral once PRN Blood Glucose LESS THAN 70 milliGRAM(s)/deciliter  glucagon  Injectable 1 milliGRAM(s) IntraMuscular once PRN Glucose LESS THAN 70 milligrams/deciliter  heparin  Injectable 6500 Unit(s) IV Push every 6 hours PRN For aPTT less than 40  heparin  Injectable 3000 Unit(s) IV Push every 6 hours PRN For aPTT between 40 - 57  oxyCODONE    5 mG/acetaminophen 325 mG 1 Tablet(s) Oral every 6 hours PRN Moderate Pain (4 - 6)  acetaminophen   Tablet 650 milliGRAM(s) Oral every 6 hours PRN For Temp greater than 38 C (100.4 F)      REVIEW OF SYSTEMS:  CONSTITUTIONAL: sudden onset of fevers   never sick before   no, weight loss, or fatigue  EYES: No eye pain, visual disturbances, or discharge  ENMT:  No difficulty hearing, tinnitus, vertigo; No sinus or throat pain  NECK: No pain or stiffness  BREASTS: No pain, masses, or nipple discharge  RESPIRATORY: No cough, wheezing, chills or hemoptysis; No shortness of breath  CARDIOVASCULAR: No chest pain, palpitations, dizziness, or leg swelling  GASTROINTESTINAL: No abdominal or epigastric pain. No nausea, vomiting, or hematemesis; No diarrhea or constipation. No melena or hematochezia.  GENITOURINARY: No dysuria, frequency, hematuria, or incontinence  NEUROLOGICAL: No headaches, memory loss,  has  loss of strength\in left arrm and a little bit on left leg   no numbness, or tremors  SKIN: No itching, burning, rashes, or lesions   LYMPH NODES: No enlarged glands  ENDOCRINE: No heat or cold intolerance; No hair loss  MUSCULOSKELETAL: No joint pain or swelling; No muscle, back, or extremity pain  PSYCHIATRIC: No depression, anxiety, mood swings, or difficulty sleeping  HEME/LYMPH: No easy bruising, or bleeding gums  ALLERGY AND IMMUNOLOGIC: No hives or eczema    VITAL SIGNS:    T(C): 37.7 (17 @ 17:05), Max: 37.8 (17 @ 11:47)  T(F): 99.8 (17 @ 17:05), Max: 100 (17 @ 11:47)  HR: 68 (17 @ 17:05) (68 - 94)  BP: 114/66 (17 @ 17:05) (114/66 - 136/68)  RR: 18 (17 @ 17:05) (18 - 18)  SpO2: 98% (17 @ 17:05) (95% - 98%)    PHYSICAL EXAM:    GENERAL: NAD, well-groomed, well-developed  HEAD:  Atraumatic, Normocephalic  EYES: EOMI, PERRLA, conjunctiva and sclera clear  ENMT: No tonsillar erythema, exudates, or enlargement; Moist mucous membranes,  NECK: Supple, No JVD, Normal thyroid  NERVOUS SYSTEM:  Alert & Oriented X3, Good concentration; Motor Strength 0-1/5 left arm   3/5 left leg ; rt arm is fine   CHEST/LUNG: Clear t bilaterally; No rales, rhonchi, wheezing bilaterally  HEART: Regular rate and rhythm; systolic ejection  murmurs,   no rubs, or gallops  ABDOMEN: Soft, Nontender, Nondistended; Bowel sounds present  EXTREMITIES:  2+ Peripheral Pulses, No clubbing, cyanosis, or edema  LYMPH: No lymphadenopathy noted  SKIN: No rashes or lesions  BACK: no pressor sore     LABS:                         12.1   17.6  )-----------( 208      ( 13 Aug 2017 13:41 )             35.9     08-13    141  |  108  |  22  ----------------------------<  137<H>  3.7   |  22  |  1.39<H>    Ca    7.6<L>      13 Aug 2017 13:41    TPro  6.5  /  Alb  2.9<L>  /  TBili  1.0  /  DBili  x   /  AST  77<H>  /  ALT  43  /  AlkPhos  50  -13    LIVER FUNCTIONS - ( 13 Aug 2017 13:41 )  Alb: 2.9 g/dL / Pro: 6.5 gm/dL / ALK PHOS: 50 U/L / ALT: 43 U/L / AST: 77 U/L / GGT: x           PT/INR - ( 13 Aug 2017 13:41 )   PT: 13.8 sec;   INR: 1.26 ratio         PTT - ( 13 Aug 2017 13:41 )  PTT:57.2 sec  Urinalysis Basic - ( 12 Aug 2017 11:09 )    Color: Yellow / Appearance: Clear / S.010 / pH: x  Gluc: x / Ketone: Moderate  / Bili: Negative / Urobili: 4 mg/dL   Blood: x / Protein: 100 mg/dL / Nitrite: Negative   Leuk Esterase: Trace / RBC: 3-5 /HPF / WBC 0-2   Sq Epi: x / Non Sq Epi: x / Bacteria: Moderate      ABG - ( 13 Aug 2017 15:22 )  pH: x     /  pCO2: 22    /  pO2: 76    / HCO3: 18    / Base Excess: -3.0  /  SaO2: 96                CARDIAC MARKERS ( 13 Aug 2017 13:41 )  .319 ng/mL / x     / 3828 U/L / x     / 5.0 ng/mL  CARDIAC MARKERS ( 12 Aug 2017 22:04 )  .081 ng/mL / x     / x     / x     / x          Thyroid Stimulating Hormone, Serum: 0.922 uU/mL ( @ 10:51)                              Radiology:      < from: CT Angio Head w/ IV Cont (17 @ 15:03) >  IMPRESSION    Unremarkable CTA study of the head and neck.                JADEN MONTERO M.D., ATTENDING RADIOLOGIST  This document has been electronically signed. Aug 13 2017  2:46PM              < end of copied text >  < from: Xray Chest 1 View AP/PA. (17 @ 13:54) >  MPRESSION:    Clear lungs. No change since 2017.                MAGDIEL TA M.D., ATTENDING RADIOLOGIST  This document has been electronically signed. Aug 13 2017  2:04PM              < end of copied text >  < from: CT Abdomen and Pelvis w/ Oral Cont and w/ IV Cont (17 @ 14:58) >  MPRESSION:    Punctate nonobstructing left intrarenal calculi. Otherwise no acute   findings. Incidental comments as above.                    GRACIELA MCNEILL M.D., ATTENDING RADIOLOGIST  This document has been electronically signed. Aug 12 2017  2:57PM          < end of copied text >

## 2017-08-13 NOTE — CONSULT NOTE ADULT - SUBJECTIVE AND OBJECTIVE BOX
Subjective Complaints:  Historian:       Consult requested by ER doctor:                  Attending: Dr Raymond    HPI:  67 y/o M w hx of DM, htn, presents w fever and abd pain x 1 week; reports the pain is in bilateral lower quadrant; denies vomiting; denies diarrhea; denies urinary symptoms; + cough; denies CP/SOB; denies lower ext swelling; no headache or neck stiffness (12 Aug 2017 15:51)Today patient was found to have a left upper extremity weaknes  and confusion    VANNESADEAN SMITH    PAST MEDICAL & SURGICAL HISTORY:  Diabetes  HTN (hypertension)  66yMale    MEDICATIONS  (STANDING):  aspirin enteric coated 81 milliGRAM(s) Oral daily  buPROPion XL . 150 milliGRAM(s) Oral daily  ATENolol  Tablet 50 milliGRAM(s) Oral daily  insulin lispro (HumaLOG) corrective regimen sliding scale   SubCutaneous three times a day before meals  dextrose 5%. 1000 milliLiter(s) (50 mL/Hr) IV Continuous <Continuous>  dextrose 50% Injectable 12.5 Gram(s) IV Push once  dextrose 50% Injectable 25 Gram(s) IV Push once  dextrose 50% Injectable 25 Gram(s) IV Push once  piperacillin/tazobactam IVPB. 3.375 Gram(s) IV Intermittent every 8 hours  sodium chloride 0.9%. 1000 milliLiter(s) (100 mL/Hr) IV Continuous <Continuous>  heparin  Infusion.  Unit(s)/Hr (15 mL/Hr) IV Continuous <Continuous>  lidocaine   Patch 1 Patch Transdermal daily  vancomycin  IVPB 1000 milliGRAM(s) IV Intermittent once  sodium chloride 0.9% Bolus 1000 milliLiter(s) IV Bolus once    MEDICATIONS  (PRN):  dextrose Gel 1 Dose(s) Oral once PRN Blood Glucose LESS THAN 70 milliGRAM(s)/deciliter  glucagon  Injectable 1 milliGRAM(s) IntraMuscular once PRN Glucose LESS THAN 70 milligrams/deciliter  heparin  Injectable 6500 Unit(s) IV Push every 6 hours PRN For aPTT less than 40  heparin  Injectable 3000 Unit(s) IV Push every 6 hours PRN For aPTT between 40 - 57  oxyCODONE    5 mG/acetaminophen 325 mG 1 Tablet(s) Oral every 6 hours PRN Moderate Pain (4 - 6)  acetaminophen   Tablet 325 milliGRAM(s) Oral every 4 hours PRN For Temp greater than 38 C (100.4 F)      Allergies    No Known Allergies    Intolerances      FAMILY HISTORY:      REVIEW OF SYSTEMS:  General:  No wt loss,low grade  fevers, chills, night sweats  Eyes:  Good vision, no reported pain  ENT:  No sore throat, pain, runny nose, dysphagia  CV:  No pain, palpitatioins, hypo/hypertension  Resp:  No dyspnea, cough, tachypnea, wheezing  GI:  No pain, nausea, vomiting, diarrhea, constipatiion  :  No pain, bleeding, incontinence, nocturia  Muscle:  No pain,left upper extremity weakness  Breast:  No pain, abscess, mass, discharge  Neuro:  No weakness, tingling, memory problems  Psych:  No fatigue, insomnia, mood problems, depression  Endocrine:  No polyuria, polydypsia, cold/heat intolerance  Heme:  No petechiae, ecchymosis, easy bruisability  Skin:  No rash, tattoos, scars, edema      Vital Signs Last 24 Hrs  T(C): 37.8 (13 Aug 2017 11:47), Max: 37.9 (12 Aug 2017 15:25)  T(F): 100 (13 Aug 2017 11:47), Max: 100.3 (12 Aug 2017 15:25)  HR: 72 (13 Aug 2017 11:47) (72 - 97)  BP: 136/68 (13 Aug 2017 11:47) (120/69 - 140/78)  BP(mean): --  RR: 18 (13 Aug 2017 11:47) (18 - 22)  SpO2: 95% (13 Aug 2017 11:47) (95% - 98%)    GENERAL PHYSICAL EXAM:  General:  Appears stated age, well-groomed, well-nourished, no distress  HEENT:  NC/AT, patent nares w/ pink mucosa, OP clear w/o lesions, PERRL, EOMI, conjunctivae clear, no thyromegaly, nodules, adenopathy, no JVD  Chest:  Full & symmetric excursion, no increased effort, breath sounds clear  Cardiovascular:  Regular rhythm, S1, S2, no murmur/rub/S3/S4, no carotid/femoral/abdominal bruit, radial/pedal pulses 2+, no edema  Abdomen:  Soft, non-tender, non-distended, normoactive bowel sounds, no HSM  Extremities:  Gait & station:   Digits:   Nails:   Joints, Bones, Muscles:   ROM:   Stability:  Skin:  No rash/erythema/ecchymoses/petechiae/wounds/abscess/warm/dry  Musculoskeletal:  Full ROM in all joints w/o swelling/tenderness/effusion    NEUROLOGICAL EXAM:  HENT:  Normocephalic head; atraumatic head.  Neck supple.  ENT: normal looking.  Mental State:    Awake.  oriented to person, place , .  Speech clear and intact.unable to cooperate ,incoherent    Cranial Nerves:  II-XII:   Pupils round and reactive to light and accommodation.  Extraocular movements full.  Visual fields full (no homonymous hemianopsia).  Visual acuity wnl.  Facial symmetry intact.  Tongue midline.  Motor Functions:  Moves all extremities.  No pronator drift of UE.weakness of left upper extremity mainly proximal muscles 4/5 distal and 2/5 proximal   Sensory Functions:  can not be assessed due to mental status  Reflexes:  Deep tendon reflexes normoactive to biceps, knees and ankles.  Babinski absent (present).  Cerebellar Testing:    Finger to nose intact.  Nystagmus absent.  Neurovascular: Carotid auscultation full without bruits.      LABS:                        12.1   17.6  )-----------( 208      ( 13 Aug 2017 13:41 )             35.9     08-13    141  |  108  |  22  ----------------------------<  137<H>  3.7   |  22  |  1.39<H>    Ca    7.6<L>      13 Aug 2017 13:41    TPro  6.5  /  Alb  2.9<L>  /  TBili  1.0  /  DBili  x   /  AST  77<H>  /  ALT  43  /  AlkPhos  50  -    PT/INR - ( 13 Aug 2017 13:41 )   PT: 13.8 sec;   INR: 1.26 ratio         PTT - ( 13 Aug 2017 13:41 )  PTT:57.2 sec    Urinalysis Basic - ( 12 Aug 2017 11:09 )    Color: Yellow / Appearance: Clear / S.010 / pH: x  Gluc: x / Ketone: Moderate  / Bili: Negative / Urobili: 4 mg/dL   Blood: x / Protein: 100 mg/dL / Nitrite: Negative   Leuk Esterase: Trace / RBC: 3-5 /HPF / WBC 0-2   Sq Epi: x / Non Sq Epi: x / Bacteria: Moderate        RADIOLOGY & ADDITIONAL STUDIES:    diltiazem    Tablet: [Known as CARDIZEM]  30 milliGRAM(s), Oral, Once, Stop After 1 Doses  Provider's Contact #: [phone] ( @ 15:21)  aspirin:   325 milliGRAM(s), Oral, Once, Stop After 1 Doses  Provider's Contact #: [phone] ( @ 15:27)  Admit to Inpatient Level of Care:     Service:  TELEMETRY    Physician:  Pierre Raymond    Additional Instructions:  Diagnosis: Sepsis; Atrial fibrillation  Isolation: None  Special Consideration: None ( @ 15:35)  Admit from ED ( @ 15:45)  Admit to Inpatient Level of Care:     LVS 2D    Service:  Telemetry    Physician:  reg ( @ 15:56)  Remote Telemetry/EKG EXCEPT Off Unit Tests:     Time/Priority:  Routine ( @ 15:56)  Diet, Consistent Carbohydrate/No Snacks ( 15:56)  aspirin enteric coated: [Known as Ecotrin]  81 milliGRAM(s), Oral, daily  Administration Instructions: swallow whole * don't crush/chew  Provider's Contact #: [phone] ( @ 19:38)  buPROPion XL .: [Known as WELLBUTRIN XL.]  150 milliGRAM(s), Oral, daily  Administration Instructions: swallow whole * don't crush/chew  This is a Look-alike/Sound-alike Medication  Provider's Contact #: [phone] ( @ 19:38)  Remote Telemetry/EKG INCLUDING Off Unit Tests:     Time/Priority:  Routine ( @ 19:58)  Pulse Oximetry:   Frequency: <Continuous> ( @ 19:58)  ATENolol  Tablet: [Known as TENORMIN]  50 milliGRAM(s), Oral, daily  Administration Instructions: This is a Look-alike/Sound-alike Medication  Provider's Contact #: [phone] ( @ 19:58)  Troponin I, Serum: 00:00  Addl Info: Rapid Rule Out 3 or 6 hours after the first draw. (:58)  12 Lead ECG:   Provider's Contact #: [phone] ( 19:58)  TTE Limited Echo w/o Cont:   Transport: Stretcher-Crib  Monitor: w/o Monitor  Prep: No CAFFEINE, TOBACCO after midnight including coffee, teas, green teas and soda.  Provider's Contact #: [phone] ( 19:58)  Consult- Cardiology:    Reason for Consult: Echocardiogram, transesophageal.   Team Name: Private MD (:58)  Hemoglobin A1C, Whole Blood: AM Sched. Collection: 13-Aug-2017 05:00 ( 19:58)  Blood Glucose Point Of Care Testing:     Frequency:  Before meals and at bedtime    Additional Instructions:  Before meals and at bedtime (:58)  Blood Glucose Point Of Care Testing:     Frequency:  every 15 minutes    Additional Instructions:  After carbohydrate administration for hypoglycemia, repeat every 15 minute(s) until blood glucose is GREATER THAN or EQUAL  milliGRAM(s)/deciLiter twice consecutively (:58)  Notify Provider For:     Additional Instructions:  Blood glucose LESS THAN 70 milliGRAM(s)/deciLiter or GREATER THAN 400 milliGRAM(s)/deciLiter (:58)  Education:     Diabetes    Other: Diet, Exercise    Additional Instructions: Diet, Exercise, Diabetes (:58)  insulin lispro (HumaLOG) corrective regimen sliding scale:       1 Unit(s) if Glucose 151 - 200      2 Unit(s) if Glucose 201 - 250      3 Unit(s) if Glucose 251 - 300      4 Unit(s) if Glucose 301 - 350      5 Unit(s) if Glucose 351 - 400      6 Unit(s) if Glucose Greater Than 400 + Contact MD  SubCutaneous, three times a day before meals  Special Instructions: Give correctional scale insulin REGARDLESS of PO status NOTIFY Provider for blood glucose LESS THAN 70 milliGRAM(s)/deciLiter or above 400 milliGRAM(s)/deciLiter.  Administration Instructions: *Per Sliding Scale*  Dispose unused medication in BLACK bin.  This is a Look-alike/Sound-alike Medication  Provider's Contact #: [phone] ( @ 19:58)  dextrose 5%.: Solution, 1000 milliLiter(s) infuse at 50 mL/Hr  Special Instructions: Conditional Order: HYPOGLYCEMIA PROTOCOL  Provider's Contact #: [phone] ( 19:58)  Administer Carbohydrates:     Additional Instructions:  HYPOGLYCEMIA PROTOCOL (:58)  dextrose Gel: [Known as GLUTOSE]  1 Dose(s), Oral, once, PRN for Blood Glucose LESS THAN 70 milliGRAM(s)/deciliter, Stop After 1 Doses  Special Instructions: Conditional Order: HYPOGLYCEMIA PROTOCOL  Provider's Contact #: [phone] ( @ 19:58)  dextrose 50% Injectable:   12.5 Gram(s), IV Push, once, Stop After 1 Doses  Special Instructions: Conditional Order: HYPOGLYCEMIA PROTOCOL  Provider's Contact #: [phone] ( 19:58)  dextrose 50% Injectable:   25 Gram(s), IV Push, once, Stop After 1 Doses  Special Instructions: Conditional Order: HYPOGLYCEMIA PROTOCOL  Provider's Contact #: [phone] ( 19:58)  dextrose 50% Injectable:   25 Gram(s), IV Push, once, Stop After 1 Doses  Special Instructions: Conditional Order: HYPOGLYCEMIA PROTOCOL  Provider's Contact #: [phone] (:58)  glucagon  Injectable:   1 milliGRAM(s), IntraMuscular, once, PRN for Glucose LESS THAN 70 milligrams/deciliter, Stop After 1 Doses  Special Instructions: Conditional Order: HYPOGLYCEMIA PROTOCOL  Provider's Contact #: [phone] (:58)  Provider to RN:       UNRESPONSIVE patient and Blood Glucose LESS THAN 70 milliGRAM(s)/deciLiter call Rapid Response.  HYPOGLYCEMIA PROTOCOL (:58)  Culture - Blood: Routine  Specimen Source: Blood-Venous (:58)  piperacillin/tazobactam IVPB.: [Known as ZOSYN IVPB.]  3.375 Gram(s) in dextrose 5% 100 milliLiter(s), IV Intermittent, every 8 hours, infuse over 4 Hour(s)  Indication: sepsis  Special Instructions: For Creatinine Clearance GREATER THAN 20 mL/min  Administration Instructions: 4 HOUR INFUSE ( @ 19:58)  sodium chloride 0.9%.: Solution, 1000 milliLiter(s) infuse at 100 mL/Hr  Provider's Contact #: [phone] ( @ 19:58)  heparin  Infusion.:   25,000 Unit(s) in dextrose 5% 250 milliLiter(s), infuse at 15 mL/Hr  Dose Rate: 1500 Unit(s)/Hr  Administration Instructions: Target aPTT = 58 - 99 seconds.  Call Prescriber for aPTT 40 or less or 128 or greater.  Titration:  USE FULL ANTICOAGULATION NOMOGRAM.  Base on drug dosing weight  Provider's Contact #: [phone] ( @ 20:10)  heparin  Injectable:   6500 Unit(s), IV Push, once, Stop After 1 Doses  Special Instructions: Initial bolus  Provider's Contact #: [phone] ( @ 20:10)  heparin  Injectable:   6500 Unit(s), IV Push, every 6 hours, PRN for For aPTT less than 40  Special Instructions: Subsequent bolus  Provider's Contact #: [phone] ( @ 20:10)  heparin  Injectable:   3000 Unit(s), IV Push, every 6 hours, PRN for For aPTT between 40 - 57  Special Instructions: Subsequent bolus  Provider's Contact #: [phone] ( @ 20:10)  Provider to RN:       Call prescriber if patient's aPTT is outside the upper or lower limits based on Nomogram ( @ 20:10)  Provider to RN:       Call prescriber with signs of bleeding or change in mental status AND discontinue heparin ( @ 20:10)  Provider to RN:       Call prescriber to report INR if patient is on warfarin while on heparin infusion ( @ 20:10)  Activated Partial Thromboplastin Time:  Start Date:13-Aug-2017. STAT ( @ 04:17)  Complete Blood Count: Repeat From: 14-Aug-2017 00:00 To: 12-Sep-2017 05:00, Every 1 day(s) ( @ 04:17)  Complete Blood Count: STAT ( @ 04:17)  acetaminophen   Tablet.: [Ordered as TYLENOL.]  650 milliGRAM(s), Oral, once, PRN for Moderate Pain (4 - 6), Stop After 1 Doses  Administration Instructions: MAX DAILY DOSE:  ADULT = 4,000 mG/Day  Provider's Contact #: [phone] ( @ 04:29)  TTE Echo Doppler w/o Cont ( @ 07:17)  oxyCODONE    5 mG/acetaminophen 325 mG: [Ordered as PERCOCET]  1 Tablet(s), Oral, every 6 hours, PRN for Moderate Pain (4 - 6)  Administration Instructions: ACETAMINOPHEN MAX DAILY DOSE:  ADULT = 4,000 mG/Day  This is a Look-alike/Sound-alike Medication  Provider's Contact #: [phone] ( @ 12:25)  lidocaine   Patch: [Known as LIDODERM]  1 Patch, Transdermal, daily  Administration Instructions: Patch may remain in place for up to 12 hours in any 24-hour period.  * External Use Only *  Provider's Contact #: [phone] ( @ 12:25)  Consult- PT Evaluate and Treat:   *Reason for Consult - Must select at least one choice*     Other: ambulation  Weight Bearing Restrictions: No ( @ 12:25)  Consult- OT Evaluate and Treat:   *Reason for Consult - Must select at least one choice*     ADL  Weight Bearing Restrictions: No ( @ 12:25)  Consult- Psychiatry, Adult:    Reason for Consult: very anxious, depression   Team Name: Private MD ( @ 12:30)  Vital Signs:     Frequency:  every 15 minutes    Additional Instructions:  CODE STROKE ( 13:15)  Blood Glucose Point Of Care Testing:     Frequency:  once    Additional Instructions:  CODE STROKE ( @ 13:15)  Assess Neurological Status:     Type of Neuro Check:  Stroke    Frequency:  every 15 minutes    Additional Instructions:  CODE STROKE ( @ 13:15)  Cardiac Monitor Bedside:     Time/Priority:  Routine    Additional Instructions:  CODE STROKE; Continuous ( 13:15)  Pulse Oximetry:   Frequency: <Continuous>    Additional Instructions:  CODE STROKE ( @ 13:15)  Comprehensive Metabolic Panel: STAT ( 13:15)  Complete Blood Count + Automated Diff: STAT ( 13:15)  Activated Partial Thromboplastin Time:  Start Date:13-Aug-2017. STAT ( @ 13:15)  Prothrombin Time and INR, Plasma:  Start Date:13-Aug-2017. STAT ( @ 13:15)  Creatine Kinase, Serum: STAT ( 13:15)  CKMB Mass Assay: STAT ( @ 13:15)  Troponin I, Serum: STAT ( 13:15)  Xray Chest 1 View AP/PA.: Urgent   Indication: Stroke code  Transport: Portable  Exam Completed  Provider's Contact #: [phone] ( @ 13:15)  CT Head No Cont: Urgent   Indication: left upper extremity weakness  Transport: Stretcher-Crib  Exam Completed ( 13:15)  12 Lead ECG:   Provider's Contact #: [phone] ( 13:15)  Type + Screen: STAT ( 13:15)  CT Angio Head w/ IV Cont: Urgent   Indication: Code Stroke  Transport: Stretcher-Crib  Provider's Contact #: [phone] ( @ 13:15)  CT Angio Neck w/ IV Cont: Urgent   Indication: Code Stroke  Transport: Stretcher-Crib  Provider's Contact #: [phone] ( @ 13:15)  Lipid Profile: AM Sched. Collection: 14-Aug-2017 05:00 ( @ 13:21)  Antibody Screen: 13:20 ( @ 13:43)  CT Angio Head w/ IV Cont: Urgent   Indication: Code Stroke  Transport: Stretcher-Crib  Exam Completed  Provider's Contact #: [phone] ( @ 14:09)  CT Angio Neck w/ IV Cont: Urgent   Indication: Code Stroke  Transport: Stretcher-Crib  Exam Completed  Provider's Contact #: [phone] ( @ 14:09)  acetaminophen   Tablet: [Known as TYLENOL]  325 milliGRAM(s), Oral, every 4 hours, PRN for For Temp greater than 38 C (100.4 F)  Administration Instructions: MAX DAILY DOSE:  ADULT = 4,000 mG/Day ( @ 14:55)  Culture - Stool: Routine  Specimen Source: Feces (:55)  Lactate, Blood: Routine ( @ :55)  Ova and Parasites: Routine  Specimen Source: Feces ( @ 14:55)  Culture - Blood: 14:55  Specimen Source: Blood ( @ 14:55)  Culture - Blood: 14:56  Specimen Source: Blood ( @ 14:55)  vancomycin  IVPB:   1000 milliGRAM(s) in dextrose 5% 250 milliLiter(s), IV Intermittent, once, infuse over 60 Minute(s), Stop After 1 Doses  Indication: fever  Provider's Contact #: [phone] ( @ 14:59)  Blood Gas Profile - Arterial: Routine ( @ 14:59)  Clostridium difficile Toxin by PCR: Routine ( @ 15:00)  sodium chloride 0.9% Bolus:   1000 milliLiter(s), IV Bolus, once, infuse over 30 Minute(s), Stop After 1 Doses  Provider's Contact #: [phone] ( @ 15:04)  MRI Head w/o Cont: Routine   Indication: left arm weakness  Transport: Stretcher-Crib  Provider's Contact #: [phone] ( @ 15:08)  US Duplex Carotid Arteries Complete, Bilateral: Routine   Indication: code stroke  Transport: Stretcher-Crib  Provider's Contact #: [phone] ( @ 15:11)      Assessment & Opinion:66 y o man seen for evaluation because of above complaints is found to have symptoms and signs of an acute cva associated with sepsis ,possible endocarditis     DX left MCA distribution infarct --ENDOCARDITIS ?     Recommendations:  Brain MRI.  Carotid doppler.  Echocardiogram.    DVT prophylaxis as ordered.  Medications:  CONTINUE IV antibiotics ,blood culture

## 2017-08-13 NOTE — CONSULT NOTE ADULT - SUBJECTIVE AND OBJECTIVE BOX
Called to see 65 Y/O male for AMS and elevated temperature. Pt previously code stroke for LUE weakness, followed by RRT for AMS. During RRT, pt was noted to have temp of 103.2.       HPI:  67 y/o M w hx of DM, htn, presents w fever and abd pain x 1 week; reports the pain is in bilateral lower quadrant; denies vomiting; denies diarrhea; denies urinary symptoms; + cough; denies CP/SOB; denies lower ext swelling; no headache or neck stiffness (12 Aug 2017 15:51)      Allergies    No Known Allergies    Intolerances        MEDICATIONS  (STANDING):  aspirin enteric coated 81 milliGRAM(s) Oral daily  buPROPion XL . 150 milliGRAM(s) Oral daily  ATENolol  Tablet 50 milliGRAM(s) Oral daily  insulin lispro (HumaLOG) corrective regimen sliding scale   SubCutaneous three times a day before meals  dextrose 5%. 1000 milliLiter(s) (50 mL/Hr) IV Continuous <Continuous>  dextrose 50% Injectable 12.5 Gram(s) IV Push once  dextrose 50% Injectable 25 Gram(s) IV Push once  dextrose 50% Injectable 25 Gram(s) IV Push once  sodium chloride 0.9%. 1000 milliLiter(s) (100 mL/Hr) IV Continuous <Continuous>  heparin  Infusion.  Unit(s)/Hr (15 mL/Hr) IV Continuous <Continuous>  lidocaine   Patch 1 Patch Transdermal daily  meropenem IVPB   IV Intermittent   meropenem IVPB 1000 milliGRAM(s) IV Intermittent every 8 hours    MEDICATIONS  (PRN):  dextrose Gel 1 Dose(s) Oral once PRN Blood Glucose LESS THAN 70 milliGRAM(s)/deciliter  glucagon  Injectable 1 milliGRAM(s) IntraMuscular once PRN Glucose LESS THAN 70 milligrams/deciliter  heparin  Injectable 6500 Unit(s) IV Push every 6 hours PRN For aPTT less than 40  heparin  Injectable 3000 Unit(s) IV Push every 6 hours PRN For aPTT between 40 - 57  oxyCODONE    5 mG/acetaminophen 325 mG 1 Tablet(s) Oral every 6 hours PRN Moderate Pain (4 - 6)  acetaminophen   Tablet 650 milliGRAM(s) Oral every 6 hours PRN For Temp greater than 38 C (100.4 F)      Daily     Daily     Drug Dosing Weight  Height (cm): 182.88 (12 Aug 2017 10:02)  Weight (kg): 83.9 (12 Aug 2017 18:00)  BMI (kg/m2): 25.1 (12 Aug 2017 18:00)  BSA (m2): 2.06 (12 Aug 2017 18:00)    PAST MEDICAL & SURGICAL HISTORY:  Diabetes  HTN (hypertension)      FAMILY HISTORY:      SOCIAL HISTORY:    ADVANCE DIRECTIVES: [ ] Full Code [ ] DNR    REVIEW OF SYSTEMS:    CONSTITUTIONAL: No fever, weight loss, or fatigue  EYES: No eye pain, visual disturbances, or discharge  ENMT:  No difficulty hearing, tinnitus, vertigo; No sinus or throat pain  NECK: No pain or stiffness  BREASTS: No pain, masses, or nipple discharge  RESPIRATORY: No cough, wheezing, chills or hemoptysis; No shortness of breath  CARDIOVASCULAR: No chest pain, palpitations, dizziness, or leg swelling  GASTROINTESTINAL: no abdominal or epigastric pain. No nausea, vomiting, or hematemesis; + diarrhea, No melena or hematochezia.  GENITOURINARY: No dysuria, frequency, hematuria, or incontinence  NEUROLOGICAL:+ weakness LUE, No headaches, memory loss          ICU Vital Signs Last 24 Hrs  T(C): 37.7 (13 Aug 2017 17:05), Max: 39.6 (13 Aug 2017 14:35)  T(F): 99.8 (13 Aug 2017 17:05), Max: 103.2 (13 Aug 2017 14:35)  HR: 68 (13 Aug 2017 17:05) (68 - 87)  BP: 114/66 (13 Aug 2017 17:05) (114/66 - 136/68)  BP(mean): --  ABP: --  ABP(mean): --  RR: 18 (13 Aug 2017 17:05) (18 - 25)  SpO2: 98% (13 Aug 2017 17:05) (95% - 99%)      ABG - ( 13 Aug 2017 15:22 )  pH: x     /  pCO2: 22    /  pO2: 76    / HCO3: 18    / Base Excess: -3.0  /  SaO2: 96            PHYSICAL EXAM:    GENERAL: NAD, well-groomed, well-developed  HEAD:  Atraumatic, Normocephalic  EYES: EOMI, PERRLA, conjunctiva and sclera clear  ENMT: No tonsillar erythema, exudates, or enlargement; Moist mucous membranes, Good dentition, No lesions  NECK: Supple, No JVD, Normal thyroid  NERVOUS SYSTEM:  Alert & Oriented X3, confused at times, decreased strength LUE  CHEST/LUNG: Clear to percussion bilaterally; No rales, rhonchi, wheezing, or rubs  HEART: Regular rate and rhythm; No murmurs, rubs  ABDOMEN: Soft, Nontender, Nondistended; Bowel sounds present  EXTREMITIES:  2+ Peripheral Pulses, No clubbing, cyanosis, or edema    LABS:  CBC Full  -  ( 13 Aug 2017 13:41 )  WBC Count : 17.6 K/uL  Hemoglobin : 12.1 g/dL  Hematocrit : 35.9 %  Platelet Count - Automated : 208 K/uL  Mean Cell Volume : 90.5 fl  Mean Cell Hemoglobin : 30.4 pg  Mean Cell Hemoglobin Concentration : 33.6 gm/dL  Auto Neutrophil # : 15.1 K/uL  Auto Lymphocyte # : 0.8 K/uL  Auto Monocyte # : 1.5 K/uL  Auto Eosinophil # : 0.0 K/uL  Auto Basophil # : 0.3 K/uL  Auto Neutrophil % : 85.8 %  Auto Lymphocyte % : 4.3 %  Auto Monocyte % : 8.4 %  Auto Eosinophil % : 0.0 %  Auto Basophil % : 1.5 %        141  |  108  |  22  ----------------------------<  137<H>  3.7   |  22  |  1.39<H>    Ca    7.6<L>      13 Aug 2017 13:41    TPro  6.5  /  Alb  2.9<L>  /  TBili  1.0  /  DBili  x   /  AST  77<H>  /  ALT  43  /  AlkPhos  50      CAPILLARY BLOOD GLUCOSE  169 (13 Aug 2017 13:16)        PT/INR - ( 13 Aug 2017 13:41 )   PT: 13.8 sec;   INR: 1.26 ratio         PTT - ( 13 Aug 2017 13:41 )  PTT:57.2 sec  Urinalysis Basic - ( 12 Aug 2017 11:09 )    Color: Yellow / Appearance: Clear / S.010 / pH: x  Gluc: x / Ketone: Moderate  / Bili: Negative / Urobili: 4 mg/dL   Blood: x / Protein: 100 mg/dL / Nitrite: Negative   Leuk Esterase: Trace / RBC: 3-5 /HPF / WBC 0-2   Sq Epi: x / Non Sq Epi: x / Bacteria: Moderate      CARDIAC MARKERS ( 13 Aug 2017 13:41 )  .319 ng/mL / x     / 3828 U/L / x     / 5.0 ng/mL  CARDIAC MARKERS ( 12 Aug 2017 22:04 )  .081 ng/mL / x     / x     / x     / x            CRITICAL CARE TIME SPENT: 80 min

## 2017-08-14 DIAGNOSIS — I10 ESSENTIAL (PRIMARY) HYPERTENSION: ICD-10-CM

## 2017-08-14 DIAGNOSIS — R41.0 DISORIENTATION, UNSPECIFIED: ICD-10-CM

## 2017-08-14 DIAGNOSIS — I48.91 UNSPECIFIED ATRIAL FIBRILLATION: ICD-10-CM

## 2017-08-14 DIAGNOSIS — F41.8 OTHER SPECIFIED ANXIETY DISORDERS: ICD-10-CM

## 2017-08-14 DIAGNOSIS — F32.9 MAJOR DEPRESSIVE DISORDER, SINGLE EPISODE, UNSPECIFIED: ICD-10-CM

## 2017-08-14 LAB
APTT BLD: 52 SEC — HIGH (ref 27.5–37.4)
APTT BLD: 62.5 SEC — HIGH (ref 27.5–37.4)
CHOLEST SERPL-MCNC: 73 MG/DL — SIGNIFICANT CHANGE UP (ref 10–199)
ERYTHROCYTE [SEDIMENTATION RATE] IN BLOOD: 25 MM/HR — HIGH (ref 0–20)
HCT VFR BLD CALC: 34 % — LOW (ref 39–50)
HDLC SERPL-MCNC: 21 MG/DL — LOW (ref 40–125)
HGB BLD-MCNC: 11.9 G/DL — LOW (ref 13–17)
LIPID PNL WITH DIRECT LDL SERPL: 36 MG/DL — SIGNIFICANT CHANGE UP
MCHC RBC-ENTMCNC: 31.4 PG — SIGNIFICANT CHANGE UP (ref 27–34)
MCHC RBC-ENTMCNC: 35.1 GM/DL — SIGNIFICANT CHANGE UP (ref 32–36)
MCV RBC AUTO: 89.4 FL — SIGNIFICANT CHANGE UP (ref 80–100)
PLATELET # BLD AUTO: 183 K/UL — SIGNIFICANT CHANGE UP (ref 150–400)
RBC # BLD: 3.8 M/UL — LOW (ref 4.2–5.8)
RBC # FLD: 11.3 % — SIGNIFICANT CHANGE UP (ref 11–15)
TOTAL CHOLESTEROL/HDL RATIO MEASUREMENT: 3.5 RATIO — SIGNIFICANT CHANGE UP (ref 3.4–9.6)
TRIGL SERPL-MCNC: 78 MG/DL — SIGNIFICANT CHANGE UP (ref 10–149)
WBC # BLD: 14 K/UL — HIGH (ref 3.8–10.5)
WBC # FLD AUTO: 14 K/UL — HIGH (ref 3.8–10.5)

## 2017-08-14 PROCEDURE — 72141 MRI NECK SPINE W/O DYE: CPT | Mod: 26

## 2017-08-14 PROCEDURE — 90792 PSYCH DIAG EVAL W/MED SRVCS: CPT

## 2017-08-14 PROCEDURE — 70551 MRI BRAIN STEM W/O DYE: CPT | Mod: 26

## 2017-08-14 PROCEDURE — 72148 MRI LUMBAR SPINE W/O DYE: CPT | Mod: 26

## 2017-08-14 PROCEDURE — 72146 MRI CHEST SPINE W/O DYE: CPT | Mod: 26

## 2017-08-14 RX ORDER — MEROPENEM 1 G/30ML
2000 INJECTION INTRAVENOUS EVERY 8 HOURS
Qty: 0 | Refills: 0 | Status: DISCONTINUED | OUTPATIENT
Start: 2017-08-14 | End: 2017-08-15

## 2017-08-14 RX ORDER — BUPROPION HYDROCHLORIDE 150 MG/1
300 TABLET, EXTENDED RELEASE ORAL DAILY
Qty: 0 | Refills: 0 | Status: DISCONTINUED | OUTPATIENT
Start: 2017-08-14 | End: 2017-08-28

## 2017-08-14 RX ORDER — CIPROFLOXACIN LACTATE 400MG/40ML
500 VIAL (ML) INTRAVENOUS EVERY 12 HOURS
Qty: 0 | Refills: 0 | Status: DISCONTINUED | OUTPATIENT
Start: 2017-08-14 | End: 2017-08-14

## 2017-08-14 RX ADMIN — HEPARIN SODIUM 1400 UNIT(S)/HR: 5000 INJECTION INTRAVENOUS; SUBCUTANEOUS at 11:18

## 2017-08-14 RX ADMIN — Medication 650 MILLIGRAM(S): at 17:53

## 2017-08-14 RX ADMIN — HEPARIN SODIUM 3000 UNIT(S): 5000 INJECTION INTRAVENOUS; SUBCUTANEOUS at 18:00

## 2017-08-14 RX ADMIN — Medication 81 MILLIGRAM(S): at 12:33

## 2017-08-14 RX ADMIN — MEROPENEM 200 MILLIGRAM(S): 1 INJECTION INTRAVENOUS at 16:00

## 2017-08-14 RX ADMIN — MEROPENEM 200 MILLIGRAM(S): 1 INJECTION INTRAVENOUS at 23:06

## 2017-08-14 RX ADMIN — HEPARIN SODIUM 1600 UNIT(S)/HR: 5000 INJECTION INTRAVENOUS; SUBCUTANEOUS at 17:58

## 2017-08-14 RX ADMIN — BUPROPION HYDROCHLORIDE 300 MILLIGRAM(S): 150 TABLET, EXTENDED RELEASE ORAL at 12:33

## 2017-08-14 RX ADMIN — Medication 1: at 12:33

## 2017-08-14 RX ADMIN — LIDOCAINE 1 PATCH: 4 CREAM TOPICAL at 16:01

## 2017-08-14 RX ADMIN — ATENOLOL 50 MILLIGRAM(S): 25 TABLET ORAL at 05:38

## 2017-08-14 RX ADMIN — Medication 100 MILLIGRAM(S): at 22:17

## 2017-08-14 RX ADMIN — Medication 650 MILLIGRAM(S): at 05:37

## 2017-08-14 RX ADMIN — LIDOCAINE 1 PATCH: 4 CREAM TOPICAL at 03:24

## 2017-08-14 RX ADMIN — Medication 250 MILLIGRAM(S): at 17:07

## 2017-08-14 RX ADMIN — Medication 250 MILLIGRAM(S): at 05:37

## 2017-08-14 RX ADMIN — SODIUM CHLORIDE 100 MILLILITER(S): 9 INJECTION INTRAMUSCULAR; INTRAVENOUS; SUBCUTANEOUS at 16:00

## 2017-08-14 RX ADMIN — MEROPENEM 200 MILLIGRAM(S): 1 INJECTION INTRAVENOUS at 05:38

## 2017-08-14 NOTE — BEHAVIORAL HEALTH ASSESSMENT NOTE - SUMMARY
Briefly, the patient is a 66 year old male, , lives at home with wife, unemployed, has a medical history notable for DM, HTN, has a psychiatric history significant for Depression, Anxiety, Personality disorder, 2 inpatient psychiatric admissions in past- last in 2015, 2 prior SA (one by OD 9 years ago, and in 2015 attempted to hang self), was engaged in treatment at [facility] (case closed in 2015 after he declined to continue at clinic), one admission to [facility] as a step from inpatient in 2015, has continued antidepressant treatment prescribed by pcp, no drug or alcohol abuse, no legal issues, presented to the ed 3 days ago with fever, abdominal pain. Found to have new onset Afib. Elevated troponins and CPK. Diarrhea during course of admisson+. Left sided weakness since weekend- CVA? MRI brain and TTE pending today.   No aggression or agitation, but staff stated that patient and wife were having an argument. Consult placed to assess anxiety and depression. Intermittent confusion and disorientation as per RN.   Based on assessment and collateral information obtained, patient and his wife state that his mood and anxiety has been fairly under control on Wellbutrin, and patient denies any SI or HI. He is future oriented, and wife states that there is no safety issues at home. Waxing and waning mentation and suspecting ongoing delirium. Discussed with patient and wife, that when medically stable- d/c from hospital, he must connect back to [facility] for outpatient psychiatric treatment- med management and also psychotherapy.

## 2017-08-14 NOTE — OCCUPATIONAL THERAPY INITIAL EVALUATION ADULT - ADDITIONAL COMMENTS
Prior to admission, pt was functioning in his roles, self sufficient & ambulating independently without any assistive devices. presently , pt needs assistance with all levels of care. Pt is unable to reach out of base of support in sitting without loss of balance ; pt  moves unilaterally due to  left side weakness; pt  is right hand dominant . The scale below depicts a picture of the pt's current level of functioning. Barthel Index: Feeding Score___5___, Bathing Score___0___, Grooming Score___0__, Dressing Score___0__, Bowel Score____0_, Bladder Score_____0_, Toilet Score___0__, Transfer Score____5__, Mobility Score_____, Stairs Score___0__, Total Score__10/100___.

## 2017-08-14 NOTE — BEHAVIORAL HEALTH ASSESSMENT NOTE - NSBHCONSULTFOLLOWAFTERCARE_PSY_A_CORE FT
Discussed with patient and wife, that when medically stable- d/c from hospital, he must connect back to [facility] for outpatient psychiatric treatment- med management and also psychotherapy. Please make referrals to outpatient geriatric mental health clinic prior to discharge home. Call [phone] option 2.

## 2017-08-14 NOTE — OCCUPATIONAL THERAPY INITIAL EVALUATION ADULT - LEVEL OF CONSCIOUSNESS, OT EVAL
alert/Pt is functioning at level  4  on the [place] Cognitive Continuum.  Pt. can integrate multiple pieces of information for a task but tends to be concrete and have difficulty sequencing the task. Pt. can begin simple problem solving.

## 2017-08-14 NOTE — OCCUPATIONAL THERAPY INITIAL EVALUATION ADULT - PERTINENT HX OF CURRENT PROBLEM, REHAB EVAL
Pt presented to ER  due to acute onset of neurological deficit. Pt is diagnosed with TIA, Sepsis. Atrial .  MRI is pending ; Head CT on 11/13/17 results confirm no  mass or occupying lesion , no acute  ischemic changes

## 2017-08-14 NOTE — PROGRESS NOTE ADULT - SUBJECTIVE AND OBJECTIVE BOX
INTERVAL HPI/OVERNIGHT EVENTS:      No change in condition.  Awaiting MRI brain  Afebrile.  Hemodynamically stable.    Antimicrobial:  meropenem IVPB   IV Intermittent   vancomycin  IVPB 1000 milliGRAM(s) IV Intermittent every 12 hours  meropenem IVPB 1000 milliGRAM(s) IV Intermittent every 8 hours    Cardiovascular:  ATENolol  Tablet 50 milliGRAM(s) Oral daily    Pulmonary:    Hematalogic:  aspirin enteric coated 81 milliGRAM(s) Oral daily  heparin  Infusion.  Unit(s)/Hr IV Continuous <Continuous>  heparin  Injectable 6500 Unit(s) IV Push every 6 hours PRN  heparin  Injectable 3000 Unit(s) IV Push every 6 hours PRN    Other:  insulin lispro (HumaLOG) corrective regimen sliding scale   SubCutaneous three times a day before meals  dextrose 5%. 1000 milliLiter(s) IV Continuous <Continuous>  dextrose Gel 1 Dose(s) Oral once PRN  dextrose 50% Injectable 12.5 Gram(s) IV Push once  dextrose 50% Injectable 25 Gram(s) IV Push once  dextrose 50% Injectable 25 Gram(s) IV Push once  glucagon  Injectable 1 milliGRAM(s) IntraMuscular once PRN  sodium chloride 0.9%. 1000 milliLiter(s) IV Continuous <Continuous>  oxyCODONE    5 mG/acetaminophen 325 mG 1 Tablet(s) Oral every 6 hours PRN  lidocaine   Patch 1 Patch Transdermal daily  acetaminophen   Tablet 650 milliGRAM(s) Oral every 6 hours PRN  buPROPion XL . 300 milliGRAM(s) Oral daily    aspirin enteric coated 81 milliGRAM(s) Oral daily  ATENolol  Tablet 50 milliGRAM(s) Oral daily  insulin lispro (HumaLOG) corrective regimen sliding scale   SubCutaneous three times a day before meals  dextrose 5%. 1000 milliLiter(s) IV Continuous <Continuous>  dextrose Gel 1 Dose(s) Oral once PRN  dextrose 50% Injectable 12.5 Gram(s) IV Push once  dextrose 50% Injectable 25 Gram(s) IV Push once  dextrose 50% Injectable 25 Gram(s) IV Push once  glucagon  Injectable 1 milliGRAM(s) IntraMuscular once PRN  sodium chloride 0.9%. 1000 milliLiter(s) IV Continuous <Continuous>  heparin  Infusion.  Unit(s)/Hr IV Continuous <Continuous>  heparin  Injectable 6500 Unit(s) IV Push every 6 hours PRN  heparin  Injectable 3000 Unit(s) IV Push every 6 hours PRN  oxyCODONE    5 mG/acetaminophen 325 mG 1 Tablet(s) Oral every 6 hours PRN  lidocaine   Patch 1 Patch Transdermal daily  meropenem IVPB   IV Intermittent   vancomycin  IVPB 1000 milliGRAM(s) IV Intermittent every 12 hours  meropenem IVPB 1000 milliGRAM(s) IV Intermittent every 8 hours  acetaminophen   Tablet 650 milliGRAM(s) Oral every 6 hours PRN  buPROPion XL . 300 milliGRAM(s) Oral daily    Drug Dosing Weight  Height (cm): 182.88 (12 Aug 2017 10:02)  Weight (kg): 83.9 (12 Aug 2017 18:00)  BMI (kg/m2): 25.1 (12 Aug 2017 18:00)  BSA (m2): 2.06 (12 Aug 2017 18:00)        SCALES: [ ] YES [ ] NO    DATE INSERTED:  REMOVE:  [ ] YES [ ] NO  EXPLAIN:      PAST MEDICAL & SURGICAL HISTORY:  Diabetes  HTN (hypertension)      REVIEW OF SYSTEMS:    CONSTITUTIONAL: No fever, weight loss, or fatigue  EYES: No eye pain, visual disturbances, or discharge  ENMT:  No difficulty hearing, tinnitus, vertigo; No sinus or throat pain  NECK: No pain or stiffness  BREASTS: No pain, masses, or nipple discharge  RESPIRATORY: No cough, wheezing, chills or hemoptysis; No shortness of breath  CARDIOVASCULAR: No chest pain, palpitations, dizziness, or leg swelling  GASTROINTESTINAL: No abdominal or epigastric pain. No nausea, vomiting, or hematemesis; No diarrhea or constipation. No melena or hematochezia.  GENITOURINARY: No dysuria, frequency, hematuria, or incontinence  NEUROLOGICAL: No headaches, memory loss, loss of strength, numbness, or tremors  SKIN: No itching, burning, rashes, or lesions   LYMPH NODES: No enlarged glands  ENDOCRINE: No heat or cold intolerance; No hair loss  MUSCULOSKELETAL: No joint pain or swelling; No muscle, back, or extremity pain  PSYCHIATRIC: No depression, anxiety, mood swings, or difficulty sleeping  HEME/LYMPH: No easy bruising, or bleeding gums  ALLERGY AND IMMUNOLOGIC: No hives or eczema      T(C): 37.6 (08-14-17 @ 12:16), Max: 39.6 (08-13-17 @ 14:35)  HR: 78 (08-14-17 @ 12:16) (67 - 81)  BP: 138/74 (08-14-17 @ 12:16) (114/66 - 150/81)  RR: 17 (08-14-17 @ 12:16) (17 - 25)  SpO2: 99% (08-14-17 @ 12:16) (96% - 99%)  Wt(kg): --    ABG - ( 13 Aug 2017 15:22 )  pH: x     /  pCO2: 22    /  pO2: 76    / HCO3: 18    / Base Excess: -3.0  /  SaO2: 96                  I&O's Detail    13 Aug 2017 07:01  -  14 Aug 2017 07:00  --------------------------------------------------------  IN:    heparin  Infusion.: 108 mL    IV PiggyBack: 200 mL    Oral Fluid: 240 mL    sodium chloride 0.9%.: 1000 mL    Solution: 250 mL  Total IN: 1798 mL    OUT:    Voided: 1151 mL  Total OUT: 1151 mL    Total NET: 647 mL      14 Aug 2017 07:01  -  14 Aug 2017 12:30  --------------------------------------------------------  IN:    Oral Fluid: 80 mL  Total IN: 80 mL    OUT:  Total OUT: 0 mL    Total NET: 80 mL            PHYSICAL EXAM:    GENERAL: NAD, well-groomed, well-developed  HEAD:  Atraumatic, Normocephalic  EYES: EOMI, PERRLA, conjunctiva and sclera clear  ENMT: No tonsillar erythema, exudates, or enlargement; Moist mucous membranes, Good dentition, No lesions  NECK: Supple, No JVD, Normal thyroid  NERVOUS SYSTEM:  Alert & Oriented X3, Good concentration; Motor Strength 5/5 B/L upper and lower extremities; DTRs 2+ intact and symmetric  CHEST/LUNG: Clear to percussion bilaterally; No rales, rhonchi, wheezing, or rubs  HEART: Regular rate and rhythm; No murmurs, rubs, or gallops  ABDOMEN: Soft, Nontender, Nondistended; Bowel sounds present  EXTREMITIES:  2+ Peripheral Pulses, No clubbing, cyanosis, or edema  LYMPH: No lymphadenopathy noted  SKIN: No rashes or lesions      LABS:  CBC Full  -  ( 14 Aug 2017 07:46 )  WBC Count : 14.0 K/uL  Hemoglobin : 11.9 g/dL  Hematocrit : 34.0 %  Platelet Count - Automated : 183 K/uL  Mean Cell Volume : 89.4 fl  Mean Cell Hemoglobin : 31.4 pg  Mean Cell Hemoglobin Concentration : 35.1 gm/dL  Auto Neutrophil # : x  Auto Lymphocyte # : x  Auto Monocyte # : x  Auto Eosinophil # : x  Auto Basophil # : x  Auto Neutrophil % : x  Auto Lymphocyte % : x  Auto Monocyte % : x  Auto Eosinophil % : x  Auto Basophil % : x    08-13    141  |  108  |  22  ----------------------------<  137<H>  3.7   |  22  |  1.39<H>    Ca    7.6<L>      13 Aug 2017 13:41    TPro  6.5  /  Alb  2.9<L>  /  TBili  1.0  /  DBili  x   /  AST  77<H>  /  ALT  43  /  AlkPhos  50  08-13    PT/INR - ( 13 Aug 2017 13:41 )   PT: 13.8 sec;   INR: 1.26 ratio         PTT - ( 14 Aug 2017 10:50 )  PTT:62.5 sec        RADIOLOGY & ADDITIONAL STUDIES:

## 2017-08-14 NOTE — PHARMACY COMMUNICATION NOTE - COMMENTS
s/w np mani kaminski - dr quezada wants to give higher dose of merrem and clindamycin due to pt's status

## 2017-08-14 NOTE — BEHAVIORAL HEALTH ASSESSMENT NOTE - OTHER PAST PSYCHIATRIC HISTORY (INCLUDE DETAILS REGARDING ONSET, COURSE OF ILLNESS, INPATIENT/OUTPATIENT TREATMENT)
has a psychiatric history significant for Depression, Anxiety, Personality disorder, 2 inpatient psychiatric admissions in past- last in 2015, 2 prior SA (one by OD 9 years ago, and in 2015 attempted to hang self), was engaged in treatment at [facility] (case closed in 2015 after he declined to continue at clinic), one admission to [facility] as a step from inpatient in 2015, has continued antidepressant treatment prescribed by pcp, no drug or alcohol abuse, no legal issues

## 2017-08-14 NOTE — PROGRESS NOTE ADULT - SUBJECTIVE AND OBJECTIVE BOX
HPI:  67 y/o M w hx of DM, htn, presents w fever and abd pain x 1 week; reports the pain is in bilateral lower quadrant; denies vomiting; denies diarrhea; denies urinary symptoms; + cough; denies CP/SOB; denies lower ext swelling; no headache or neck stiffness (12 Aug 2017 15:51)  yesterday left  arm paralysis and left  lower extremity weakness   by error i wrote rt arm yesterday   today still febrile     Allergies    No Known Allergies    Intolerances        MEDICATIONS  (STANDING):  aspirin enteric coated 81 milliGRAM(s) Oral daily  ATENolol  Tablet 50 milliGRAM(s) Oral daily  insulin lispro (HumaLOG) corrective regimen sliding scale   SubCutaneous three times a day before meals  dextrose 5%. 1000 milliLiter(s) (50 mL/Hr) IV Continuous <Continuous>  dextrose 50% Injectable 12.5 Gram(s) IV Push once  dextrose 50% Injectable 25 Gram(s) IV Push once  dextrose 50% Injectable 25 Gram(s) IV Push once  sodium chloride 0.9%. 1000 milliLiter(s) (100 mL/Hr) IV Continuous <Continuous>  heparin  Infusion.  Unit(s)/Hr (15 mL/Hr) IV Continuous <Continuous>  lidocaine   Patch 1 Patch Transdermal daily  meropenem IVPB   IV Intermittent   vancomycin  IVPB 1000 milliGRAM(s) IV Intermittent every 12 hours  meropenem IVPB 1000 milliGRAM(s) IV Intermittent every 8 hours  buPROPion XL . 300 milliGRAM(s) Oral daily    MEDICATIONS  (PRN):  dextrose Gel 1 Dose(s) Oral once PRN Blood Glucose LESS THAN 70 milliGRAM(s)/deciliter  glucagon  Injectable 1 milliGRAM(s) IntraMuscular once PRN Glucose LESS THAN 70 milligrams/deciliter  heparin  Injectable 6500 Unit(s) IV Push every 6 hours PRN For aPTT less than 40  heparin  Injectable 3000 Unit(s) IV Push every 6 hours PRN For aPTT between 40 - 57  oxyCODONE    5 mG/acetaminophen 325 mG 1 Tablet(s) Oral every 6 hours PRN Moderate Pain (4 - 6)  acetaminophen   Tablet 650 milliGRAM(s) Oral every 6 hours PRN For Temp greater than 38 C (100.4 F)      REVIEW OF SYSTEMS:    CONSTITUTIONAL: ongoing  fever,   no  chills,  no  weight loss or weight loss   HEENT: No sore throat, runny nose, ear ache  RESPIRATORY: No cough, wheezing, No shortness of breath  CARDIOVASCULAR: No chest pain, palpitations, dizziness  GASTROINTESTINAL: No abdominal pain. No nausea, vomiting, diarrhea  GENITOURINARY: No dysuria, increase frequency, hematuria, or incontinence  NEUROLOGICAL: No headaches, memory loss,   has loss of strength in left arm and leg    no numbness, or tremors, no weakness  EXTREMITY: No pedal edema BLE  SKIN: No itching, burning, rashes, or lesions     VITAL SIGNS:  T(C): 38.1 (08-14-17 @ 16:50), Max: 38.2 (08-14-17 @ 05:06)  T(F): 100.6 (08-14-17 @ 16:50), Max: 100.8 (08-14-17 @ 05:06)  HR: 65 (08-14-17 @ 16:50) (65 - 78)  BP: 166/70 (08-14-17 @ 16:50) (138/61 - 166/70)  RR: 19 (08-14-17 @ 16:50) (17 - 19)  SpO2: 97% (08-14-17 @ 16:50) (96% - 99%)  Wt(kg): --    PHYSICAL EXAM:    GENERAL: not in any distress  HEENT: Neck is supple, normocephalic, atraumatic   CHEST/LUNG: Clear to percussion bilaterally; No rales, rhonchi, wheezing  HEART: Regular rate and rhythm; No murmurs, rubs, or gallops  ABDOMEN: Soft, Nontender, Nondistended; Bowel sounds present, no rebound   EXTREMITIES:  2+ Peripheral Pulses, No clubbing, cyanosis, or edema  still very weak left arm and leg   SKIN: No rashes or lesions  BACK: no pressor sore   NERVOUS SYSTEM:  Alert & Oriented X3, Good concentration  PSYCH: normal affect     LABS:                         11.9   14.0  )-----------( 183      ( 14 Aug 2017 07:46 )             34.0     08-13    141  |  108  |  22  ----------------------------<  137<H>  3.7   |  22  |  1.39<H>    Ca    7.6<L>      13 Aug 2017 13:41    TPro  6.5  /  Alb  2.9<L>  /  TBili  1.0  /  DBili  x   /  AST  77<H>  /  ALT  43  /  AlkPhos  50  08-13    LIVER FUNCTIONS - ( 13 Aug 2017 13:41 )  Alb: 2.9 g/dL / Pro: 6.5 gm/dL / ALK PHOS: 50 U/L / ALT: 43 U/L / AST: 77 U/L / GGT: x           PT/INR - ( 13 Aug 2017 13:41 )   PT: 13.8 sec;   INR: 1.26 ratio         PTT - ( 14 Aug 2017 17:12 )  PTT:52.0 sec    ABG - ( 13 Aug 2017 15:22 )  pH: x     /  pCO2: 22    /  pO2: 76    / HCO3: 18    / Base Excess: -3.0  /  SaO2: 96                CARDIAC MARKERS ( 13 Aug 2017 21:37 )  .389 ng/mL / x     / 4163 U/L / x     / x      CARDIAC MARKERS ( 13 Aug 2017 13:41 )  .319 ng/mL / x     / 3828 U/L / x     / 5.0 ng/mL  CARDIAC MARKERS ( 12 Aug 2017 22:04 )  .081 ng/mL / x     / x     / x     / x          Thyroid Stimulating Hormone, Serum: 0.922 uU/mL (08-12 @ 10:51)      Sedimentation Rate, Erythrocyte: 25 mm/hr (08-14 @ 08:08)                          Radiology:    < from: MRI Head w/o Cont (08.14.17 @ 15:39) >  FINDINGS:    Brain:  There is diffuse cerebral atrophy present, consistent with this   patient&apos;s age.  There is no acute infarction.  No intracranial   hemorrhage.  No   mass effect or midline shift.    Ventricles:  There is no hydrocephalus.    Bones/joints:  There is no focal suspicious osseous abnormality.    Sinuses:  No acute finding.  No acute sinusitis.    Mastoid air cells:  No acute finding.      IMPRESSION:         No acute intracranial abnormality.    Atrophy.          ******PRELIMINARY REPORT******    ******PRELIMINARY REPORT******              MAHAMED HOOD M.D.;DANNAAD RADIOLOGIST                < end of copied text >

## 2017-08-14 NOTE — OCCUPATIONAL THERAPY INITIAL EVALUATION ADULT - TRANSFER SAFETY CONCERNS NOTED: BED/CHAIR, REHAB EVAL
decreased safety awareness/squat pivot/losing balance/decreased sequencing ability/decreased proprioception/decreased weight-shifting ability/stand pivot/decreased step length/decreased balance during turns

## 2017-08-14 NOTE — OCCUPATIONAL THERAPY INITIAL EVALUATION ADULT - PHYSICAL ASSIST/NONPHYSICAL ASSIST, REHAB EVAL
1 person assist/nonverbal cues (demo/gestures)/verbal cues/to enable pt to assist with self care tasks & positioning for pressure relief.

## 2017-08-14 NOTE — PHYSICAL THERAPY INITIAL EVALUATION ADULT - PERTINENT HX OF CURRENT PROBLEM, REHAB EVAL
Pt. stated he was in his usual state of health of being independent in ADLs, c/o abdominal pains and fever , CT 8/13: Unremarkable CTA study head and neck

## 2017-08-14 NOTE — OCCUPATIONAL THERAPY INITIAL EVALUATION ADULT - PLANNED THERAPY INTERVENTIONS, OT EVAL
balance training/cognitive, visual perceptual/joint mobilization/neuromuscular re-education/ROM/stretching/parent/caregiver training.../IADL retraining/energy conservation techniques/fine motor coordination training/strengthening/motor coordination training/transfer training/ADL retraining/bed mobility training/massage

## 2017-08-14 NOTE — OCCUPATIONAL THERAPY INITIAL EVALUATION ADULT - RANGE OF MOTION EXAMINATION, LOWER EXTREMITY
Left LE Active Assistive ROM was WFL (within functional limits)/Left LE Passive ROM was WFL (w/i functional limits)/Right LE Active ROM was WNL(within normal limits)/Right LE Passive ROM was WNL (within normal limits)

## 2017-08-14 NOTE — PHYSICAL THERAPY INITIAL EVALUATION ADULT - PLANNED THERAPY INTERVENTIONS, PT EVAL
transfer training/balance training/gait training/postural re-education/strengthening/bed mobility training

## 2017-08-14 NOTE — BEHAVIORAL HEALTH ASSESSMENT NOTE - NSBHCHARTREVIEWLAB_PSY_A_CORE FT
ESR: 25; WBC: 14.0; HGB: 11.9; PC: 183; LACTATE: 1.5; NA: 141; BUN: 22; CR: 1.39; CK: 3828; LFT: WNL; CKMB: 5.0; TROPONINS: 0.319

## 2017-08-14 NOTE — PROGRESS NOTE ADULT - ASSESSMENT
fever   acute neuro abnormalities   discussed with dr hernandez   discussed with mri tech   need stat mri of cervical thoracic spine  spoke to radiology   will transport

## 2017-08-14 NOTE — OCCUPATIONAL THERAPY INITIAL EVALUATION ADULT - GENERAL OBSERVATIONS, REHAB EVAL
Pt was seen for initial OT evalution encountered supine in bed, AA&Ox4, confused at times , but  cooperative & followed commands. Pt presents with decreased endurance , left side weakness , impaired coordination, balance  ADL management and functional mobility; Pt was seen for initial OT evaluation encountered supine in bed, AA&Ox4, confused at times , but  cooperative & followed commands. Pt presents with decreased endurance , left side weakness , impaired coordination, balance  ADL management and functional mobility;

## 2017-08-14 NOTE — OCCUPATIONAL THERAPY INITIAL EVALUATION ADULT - IMPAIRMENTS CONTRIBUTING IMPAIRED BED MOBILITY, REHAB EVAL
decreased ROM/decreased sensation/ataxic/decreased flexibility/scissoring/abnormal muscle tone/narrow base of support/impaired motor control/impaired coordination/impaired balance/impaired postural control/decreased strength/cognition

## 2017-08-14 NOTE — OCCUPATIONAL THERAPY INITIAL EVALUATION ADULT - PERSONAL SAFETY AND JUDGMENT, REHAB EVAL
impaired/Pt  needs verbal cues for  proper hand and foot  placement and to safely  maneuver  rolling walker

## 2017-08-14 NOTE — OCCUPATIONAL THERAPY INITIAL EVALUATION ADULT - SOCIAL CONCERNS
Complex psychosocial needs/coping issues/Pt voiced concerns   about the inability to move  his left hand and ambulate due to  generalized weakness . Complex psychosocial needs/coping issues/Pt voiced concerns about the inability to move  his left hand and ambulate due to  generalized weakness .

## 2017-08-14 NOTE — OCCUPATIONAL THERAPY INITIAL EVALUATION ADULT - LIVES WITH, PROFILE
in a private house  with 12 steps to enter  with  bilateral hand  rail and a flight  of stairs to get to  his bed room  and bathroom; pt's  bathroom  has a tub and shower combination  and is not equipped with shower chair ,  raised toilet seat  or grab bars./spouse

## 2017-08-14 NOTE — PHYSICAL THERAPY INITIAL EVALUATION ADULT - ORIENTATION, REHAB EVAL
oriented to person, place, time and situation [place] level 4b/oriented to person, place, time and situation

## 2017-08-14 NOTE — OCCUPATIONAL THERAPY INITIAL EVALUATION ADULT - PRECAUTIONS/LIMITATIONS, REHAB EVAL
cardiac precautions/Pt exhibits diminished depth  perception, figure ground , spatial relation , kinesthesia  and proprioception; pt is unable to move left UE;  movement velocity  and  reflexes in LUE/LE are significantly  diminished/fall precautions

## 2017-08-14 NOTE — BEHAVIORAL HEALTH ASSESSMENT NOTE - HPI (INCLUDE ILLNESS QUALITY, SEVERITY, DURATION, TIMING, CONTEXT, MODIFYING FACTORS, ASSOCIATED SIGNS AND SYMPTOMS)
Briefly, the patient is a 66 year old male, , lives at home with wife, unemployed, has a medical history notable for DM, HTN, has a psychiatric history significant for Depression, Anxiety, Personality disorder, 2 inpatient psychiatric admissions in past- last in 2015, 2 prior SA (one by OD 9 years ago, and in 2015 attempted to hang self), was engaged in treatment at [facility] (case closed in 2015 after he declined to continue at clinic), one admission to [facility] as a step from inpatient in 2015, has continued antidepressant treatment prescribed by pcp, no drug or alcohol abuse, no legal issues, presented to the ed 3 days ago with fever, abdominal pain. Found to have new onset Afib. Elevated troponins and CPK. Diarrhea during course of admisson+. Left sided weakness since weekend- CVA? MRI brain and TTE pending today.   No aggression or agitation, but staff stated that patient and wife were having an argument. Consult placed to assess anxiety and depression. Intermittent confusion and disorientation as per RN.     Met with the patient and his wife today for the consult. Patient is alert and oriented now. Calm, and answers questions. Able to state reasons for his ed admission. Patient and his wife states that patient's mood has been stable on Wellbutrin. Admits to intermittent sadness in context to - ' feeling lonely, and missing daughters who has moved away to [place]', but they deny the mood to be pervasive. He denies any hopelessness or changes in energy or appetite or sleep. He states that he had a vague SI one month ago, but it was an automatic thought with no plan or intention. He is future oriented, and states- ' I have much to live for, and I will do anything to hurt myself coz that will hurt my family who I love. I am waiting to become a grandfather one day'. He denies any si/hi/i/p today. Regarding anxiety, admits that he does experience intermittent anxiety symptoms in context to above stressors, but again denies it to be pervasive. States that he loves to go for walks, and 'paints toys' as a hobby. He denies any a/vh or paranoia. Denies any drug or alcohol abuse.     Wife corroborates with the above information provided by the patient, and states that patient has indeed been stable, and has been compliant with Wellbutrin in community. No safety issues reported by the patient or his wife. Discussed with patient and wife, that he must connect back to [facility] for outpatient psychiatric treatment- med management and also psychotherapy.

## 2017-08-14 NOTE — PHYSICAL THERAPY INITIAL EVALUATION ADULT - BALANCE DISTURBANCE, IDENTIFIED IMPAIRMENT CONTRIBUTE, REHAB EVAL
impaired postural control/decreased sensation/abnormal muscle tone/decreased strength/impaired motor control

## 2017-08-14 NOTE — CONSULT NOTE ADULT - SUBJECTIVE AND OBJECTIVE BOX
Patient is a 66y old  Male who presents with a chief complaint of fever, abdominal pain (12 Aug 2017 15:51)        PAST MEDICAL & SURGICAL HISTORY:  Diabetes  HTN (hypertension)      Summary of admission HPI:                PREVIOUS DIAGNOSTIC TESTING:      ECHO  FINDINGS:    STRESS  FINDINGS:    CATHETERIZATION  FINDINGS:    ELECTROPHYSIOLOGY STUDY  FINDINGS:    CAROTID ULTRASOUND:  FINDINGS    VENOUS DUPLEX SCAN:  FINDINGS:    CHEST CT PULMONARY ANGIO with IV Contrast:  FINDINGS:  MEDICATIONS  (STANDING):  aspirin enteric coated 81 milliGRAM(s) Oral daily  ATENolol  Tablet 50 milliGRAM(s) Oral daily  insulin lispro (HumaLOG) corrective regimen sliding scale   SubCutaneous three times a day before meals  dextrose 5%. 1000 milliLiter(s) (50 mL/Hr) IV Continuous <Continuous>  dextrose 50% Injectable 12.5 Gram(s) IV Push once  dextrose 50% Injectable 25 Gram(s) IV Push once  dextrose 50% Injectable 25 Gram(s) IV Push once  sodium chloride 0.9%. 1000 milliLiter(s) (100 mL/Hr) IV Continuous <Continuous>  heparin  Infusion.  Unit(s)/Hr (15 mL/Hr) IV Continuous <Continuous>  lidocaine   Patch 1 Patch Transdermal daily  meropenem IVPB   IV Intermittent   vancomycin  IVPB 1000 milliGRAM(s) IV Intermittent every 12 hours  meropenem IVPB 1000 milliGRAM(s) IV Intermittent every 8 hours  buPROPion XL . 300 milliGRAM(s) Oral daily    MEDICATIONS  (PRN):  dextrose Gel 1 Dose(s) Oral once PRN Blood Glucose LESS THAN 70 milliGRAM(s)/deciliter  glucagon  Injectable 1 milliGRAM(s) IntraMuscular once PRN Glucose LESS THAN 70 milligrams/deciliter  heparin  Injectable 6500 Unit(s) IV Push every 6 hours PRN For aPTT less than 40  heparin  Injectable 3000 Unit(s) IV Push every 6 hours PRN For aPTT between 40 - 57  oxyCODONE    5 mG/acetaminophen 325 mG 1 Tablet(s) Oral every 6 hours PRN Moderate Pain (4 - 6)  acetaminophen   Tablet 650 milliGRAM(s) Oral every 6 hours PRN For Temp greater than 38 C (100.4 F)      FAMILY HISTORY:      SOCIAL HISTORY:    CIGARETTES:    ALCOHOL:    REVIEW OF SYSTEMS:    CONSTITUTIONAL: No fever, weight loss, chills, shakes, or fatigue  EYES: No eye pain, visual disturbances, or discharge  ENMT:  No difficulty hearing, tinnitus, vertigo; No sinus or throat pain  NECK: No pain or stiffness  BREASTS: No pain, masses, or nipple discharge  RESPIRATORY: No cough, wheezing, hemoptysis, or shortness of breath  CARDIOVASCULAR: No chest pain, dyspnea, palpitations, dizziness, syncope, paroxysmal nocturnal dyspnea, orthopnea, or arm or leg swelling  GASTROINTESTINAL: No abdominal  or epigastric pain, nausea, vomiting, hematemesis, diarrhea, constipation, melena or bright red blood.  GENITOURINARY: No dysuria, nocturia, hematuria, or urinary incontinence  NEUROLOGICAL: No headaches, memory loss, slurred speech, limb weakness, loss of strength, numbness, or tremors  SKIN: No itching, burning, rashes, or lesions   LYMPH NODES: No enlarged glands  ENDOCRINE: No heat or cold intolerance, or hair loss  MUSCULOSKELETAL: No joint pain or swelling, muscle, back, or extremity pain  PSYCHIATRIC: No depression, anxiety, or difficulty sleeping  HEME/LYMPH: No easy bruising or bleeding gums  ALLERY AND IMMUNOLOGIC: No hives or rash.      Vital Signs Last 24 Hrs  T(C): 37.1 (14 Aug 2017 08:53), Max: 39.6 (13 Aug 2017 14:35)  T(F): 98.7 (14 Aug 2017 08:53), Max: 103.2 (13 Aug 2017 14:35)  HR: 69 (14 Aug 2017 10:41) (67 - 81)  BP: 144/67 (14 Aug 2017 10:41) (114/66 - 150/81)  BP(mean): --  RR: 18 (14 Aug 2017 05:06) (18 - 25)  SpO2: 97% (14 Aug 2017 05:06) (95% - 99%)    PHYSICAL EXAM:    GENERAL: In no apparent distress, well nourished, and hydrated.  HEAD:  Atraumatic, Normocephalic  EYES: EOMI, PERRLA, conjunctiva and sclera clear  ENMT: No tonsillar erythema, exudates, or enlargement; Moist mucous membranes, Good dentition, No lesions  NECK: Supple and normal thyroid.  No JVD or carotid bruit.  Carotid pulse is 2+ bilaterally.  HEART: Regular rate and rhythm; No murmurs, rubs, or gallops.  PULMONARY: Clear to auscultation and perfusion.  No rales, wheezing, or rhonchi bilaterally.  ABDOMEN: Soft, Nontender, Nondistended; Bowel sounds present  EXTREMITIES:  2+ Peripheral Pulses, No clubbing, cyanosis, or edema  LYMPH: No lymphadenopathy noted  NEUROLOGICAL: Grossly nonfocal          INTERPRETATION OF TELEMETRY:    ECG:    I&O's Detail    13 Aug 2017 07:01  -  14 Aug 2017 07:00  --------------------------------------------------------  IN:    heparin  Infusion.: 108 mL    IV PiggyBack: 200 mL    Oral Fluid: 240 mL    sodium chloride 0.9%.: 1000 mL    Solution: 250 mL  Total IN: 1798 mL    OUT:    Voided: 1151 mL  Total OUT: 1151 mL    Total NET: 647 mL          LABS:                        11.9   14.0  )-----------( 183      ( 14 Aug 2017 07:46 )             34.0     08-13    141  |  108  |  22  ----------------------------<  137<H>  3.7   |  22  |  1.39<H>    Ca    7.6<L>      13 Aug 2017 13:41    TPro  6.5  /  Alb  2.9<L>  /  TBili  1.0  /  DBili  x   /  AST  77<H>  /  ALT  43  /  AlkPhos  50  08-13    CARDIAC MARKERS ( 13 Aug 2017 21:37 )  .389 ng/mL / x     / 4163 U/L / x     / x      CARDIAC MARKERS ( 13 Aug 2017 13:41 )  .319 ng/mL / x     / 3828 U/L / x     / 5.0 ng/mL  CARDIAC MARKERS ( 12 Aug 2017 22:04 )  .081 ng/mL / x     / x     / x     / x          PT/INR - ( 13 Aug 2017 13:41 )   PT: 13.8 sec;   INR: 1.26 ratio         PTT - ( 14 Aug 2017 10:50 )  PTT:62.5 sec    BNP  I&O's Detail    13 Aug 2017 07:01  -  14 Aug 2017 07:00  --------------------------------------------------------  IN:    heparin  Infusion.: 108 mL    IV PiggyBack: 200 mL    Oral Fluid: 240 mL    sodium chloride 0.9%.: 1000 mL    Solution: 250 mL  Total IN: 1798 mL    OUT:    Voided: 1151 mL  Total OUT: 1151 mL    Total NET: 647 mL        Daily     Daily     RADIOLOGY & ADDITIONAL STUDIES:

## 2017-08-14 NOTE — BEHAVIORAL HEALTH ASSESSMENT NOTE - RISK ASSESSMENT
Has chronic risk factors: older male, history of depression-anxiety, multiple ongoing stressors, intermittent struggles to cope, history of 2 SA, pain, fractured sleep at night due to pain.  No over acute risks: depression and anxiety fairly stable, denies any SI or HI, denies any psychosis, denies any substance abuse, is future oriented, able to engage in safety and discharge planning, has been compliant with antidepressant treatment in communuty  Protective factors: daughters, wife, desire to self as a grandfather

## 2017-08-14 NOTE — OCCUPATIONAL THERAPY INITIAL EVALUATION ADULT - STANDING BALANCE: DYNAMIC, REHAB EVAL
with RW with left  hand over hand assistance to  maintain  gross grasps on rolling walker/poor balance

## 2017-08-14 NOTE — OCCUPATIONAL THERAPY INITIAL EVALUATION ADULT - BALANCE DISTURBANCE, IDENTIFIED IMPAIRMENT CONTRIBUTE, REHAB EVAL
impaired postural control/decreased strength/abnormal muscle tone/impaired coordination/impaired motor control/decreased ROM/decreased sensation/impaired sensory feedback

## 2017-08-14 NOTE — PHYSICAL THERAPY INITIAL EVALUATION ADULT - PRECAUTIONS/LIMITATIONS, REHAB EVAL
fall risk, unsteady standing and ambulation balance, deficits in strength mostly in the LUE and LLE,

## 2017-08-14 NOTE — PHYSICAL THERAPY INITIAL EVALUATION ADULT - CRITERIA FOR SKILLED THERAPEUTIC INTERVENTIONS
functional limitations in following categories/risk reduction/prevention/anticipated discharge recommendation/impairments found

## 2017-08-14 NOTE — OCCUPATIONAL THERAPY INITIAL EVALUATION ADULT - RANGE OF MOTION EXAMINATION, UPPER EXTREMITY
Right UE Active ROM was WFL (within functional limits)/decreased concentric  and eccentric control are noted in LUE; pt   has sporadic mass movement distally/Left UE Passive ROM was WFL  (within functional limits)/Right UE Passive ROM was WFL  (within functional limits)

## 2017-08-14 NOTE — PHYSICAL THERAPY INITIAL EVALUATION ADULT - IMPAIRMENTS CONTRIBUTING TO GAIT DEVIATIONS, PT EVAL
impaired motor control/impaired coordination/decreased strength/impaired sensory feedback/impaired balance/narrow base of support

## 2017-08-14 NOTE — PHYSICAL THERAPY INITIAL EVALUATION ADULT - TRANSFER SAFETY CONCERNS NOTED: SIT/STAND, REHAB EVAL
decreased safety awareness/decreased weight-shifting ability/both knees buckle/decreased step length/decreased balance during turns/losing balance/stepping too close to front of assistive device

## 2017-08-14 NOTE — PHYSICAL THERAPY INITIAL EVALUATION ADULT - LIVES WITH, PROFILE
Lives in a private house with wife, 5 steps to enter with rail, 1 flight  of steps inside with rails

## 2017-08-14 NOTE — OCCUPATIONAL THERAPY INITIAL EVALUATION ADULT - IMPAIRED TRANSFERS: BED/CHAIR, REHAB EVAL
decreased ROM/decreased sensation/abnormal muscle tone/impaired balance/scissoring/impaired motor control/decreased flexibility/decreased strength/impaired coordination/narrow base of support/ataxic/impaired sensory feedback/cognition/impaired postural control

## 2017-08-14 NOTE — PHYSICAL THERAPY INITIAL EVALUATION ADULT - GAIT DEVIATIONS NOTED, PT EVAL
decreased stride length/decreased weight-shifting ability/decreased velocity of limb motion/decreased bernardino/increased stride width

## 2017-08-14 NOTE — PHYSICAL THERAPY INITIAL EVALUATION ADULT - IMPAIRMENTS FOUND, PT EVAL
aerobic capacity/endurance/gait, locomotion, and balance/muscle strength/posture/ergonomics and body mechanics

## 2017-08-14 NOTE — BEHAVIORAL HEALTH ASSESSMENT NOTE - NSBHSUICPROTECTFACT_PSY_A_CORE
Responsibility to family and others/Identifies reasons for living/Supportive social network or family/Future oriented/Positive therapeutic relationships

## 2017-08-15 LAB
ALBUMIN SERPL ELPH-MCNC: 2.5 G/DL — LOW (ref 3.3–5)
ALP SERPL-CCNC: 53 U/L — SIGNIFICANT CHANGE UP (ref 40–120)
ALT FLD-CCNC: 55 U/L — SIGNIFICANT CHANGE UP (ref 12–78)
ANION GAP SERPL CALC-SCNC: 11 MMOL/L — SIGNIFICANT CHANGE UP (ref 5–17)
APTT BLD: 64.1 SEC — HIGH (ref 27.5–37.4)
APTT BLD: 67.2 SEC — HIGH (ref 27.5–37.4)
AST SERPL-CCNC: 63 U/L — HIGH (ref 15–37)
BILIRUB SERPL-MCNC: 0.9 MG/DL — SIGNIFICANT CHANGE UP (ref 0.2–1.2)
BUN SERPL-MCNC: 14 MG/DL — SIGNIFICANT CHANGE UP (ref 7–23)
CALCIUM SERPL-MCNC: 7.6 MG/DL — LOW (ref 8.5–10.1)
CHLORIDE SERPL-SCNC: 107 MMOL/L — SIGNIFICANT CHANGE UP (ref 96–108)
CO2 SERPL-SCNC: 22 MMOL/L — SIGNIFICANT CHANGE UP (ref 22–31)
CREAT SERPL-MCNC: 0.82 MG/DL — SIGNIFICANT CHANGE UP (ref 0.5–1.3)
GLUCOSE SERPL-MCNC: 165 MG/DL — HIGH (ref 70–99)
HCT VFR BLD CALC: 37.4 % — LOW (ref 39–50)
HGB BLD-MCNC: 13.5 G/DL — SIGNIFICANT CHANGE UP (ref 13–17)
MCHC RBC-ENTMCNC: 31.5 PG — SIGNIFICANT CHANGE UP (ref 27–34)
MCHC RBC-ENTMCNC: 36.1 GM/DL — HIGH (ref 32–36)
MCV RBC AUTO: 87.2 FL — SIGNIFICANT CHANGE UP (ref 80–100)
PLATELET # BLD AUTO: 227 K/UL — SIGNIFICANT CHANGE UP (ref 150–400)
POTASSIUM SERPL-MCNC: 3.2 MMOL/L — LOW (ref 3.5–5.3)
POTASSIUM SERPL-SCNC: 3.2 MMOL/L — LOW (ref 3.5–5.3)
PROT SERPL-MCNC: 6.5 GM/DL — SIGNIFICANT CHANGE UP (ref 6–8.3)
RBC # BLD: 4.29 M/UL — SIGNIFICANT CHANGE UP (ref 4.2–5.8)
RBC # FLD: 11.3 % — SIGNIFICANT CHANGE UP (ref 11–15)
SODIUM SERPL-SCNC: 140 MMOL/L — SIGNIFICANT CHANGE UP (ref 135–145)
WBC # BLD: 11.4 K/UL — HIGH (ref 3.8–10.5)
WBC # FLD AUTO: 11.4 K/UL — HIGH (ref 3.8–10.5)

## 2017-08-15 PROCEDURE — 99232 SBSQ HOSP IP/OBS MODERATE 35: CPT

## 2017-08-15 PROCEDURE — 72141 MRI NECK SPINE W/O DYE: CPT | Mod: 26

## 2017-08-15 PROCEDURE — 93010 ELECTROCARDIOGRAM REPORT: CPT

## 2017-08-15 PROCEDURE — 93880 EXTRACRANIAL BILAT STUDY: CPT | Mod: 26

## 2017-08-15 RX ORDER — POTASSIUM CHLORIDE 20 MEQ
10 PACKET (EA) ORAL
Qty: 0 | Refills: 0 | Status: COMPLETED | OUTPATIENT
Start: 2017-08-15 | End: 2017-08-15

## 2017-08-15 RX ORDER — MEROPENEM 1 G/30ML
500 INJECTION INTRAVENOUS EVERY 8 HOURS
Qty: 0 | Refills: 0 | Status: DISCONTINUED | OUTPATIENT
Start: 2017-08-15 | End: 2017-08-26

## 2017-08-15 RX ADMIN — MEROPENEM 200 MILLIGRAM(S): 1 INJECTION INTRAVENOUS at 22:47

## 2017-08-15 RX ADMIN — LIDOCAINE 1 PATCH: 4 CREAM TOPICAL at 12:20

## 2017-08-15 RX ADMIN — SODIUM CHLORIDE 100 MILLILITER(S): 9 INJECTION INTRAMUSCULAR; INTRAVENOUS; SUBCUTANEOUS at 22:49

## 2017-08-15 RX ADMIN — HEPARIN SODIUM 1600 UNIT(S)/HR: 5000 INJECTION INTRAVENOUS; SUBCUTANEOUS at 01:47

## 2017-08-15 RX ADMIN — Medication 100 MILLIEQUIVALENT(S): at 12:25

## 2017-08-15 RX ADMIN — ATENOLOL 50 MILLIGRAM(S): 25 TABLET ORAL at 05:34

## 2017-08-15 RX ADMIN — Medication 81 MILLIGRAM(S): at 14:16

## 2017-08-15 RX ADMIN — HEPARIN SODIUM 1600 UNIT(S)/HR: 5000 INJECTION INTRAVENOUS; SUBCUTANEOUS at 12:00

## 2017-08-15 RX ADMIN — Medication 1: at 18:15

## 2017-08-15 RX ADMIN — Medication 1: at 08:00

## 2017-08-15 RX ADMIN — MEROPENEM 200 MILLIGRAM(S): 1 INJECTION INTRAVENOUS at 16:54

## 2017-08-15 RX ADMIN — SODIUM CHLORIDE 100 MILLILITER(S): 9 INJECTION INTRAMUSCULAR; INTRAVENOUS; SUBCUTANEOUS at 05:35

## 2017-08-15 RX ADMIN — Medication 2 MILLIGRAM(S): at 08:48

## 2017-08-15 RX ADMIN — Medication 1: at 12:20

## 2017-08-15 RX ADMIN — MEROPENEM 200 MILLIGRAM(S): 1 INJECTION INTRAVENOUS at 05:34

## 2017-08-15 RX ADMIN — Medication 100 MILLIEQUIVALENT(S): at 18:15

## 2017-08-15 RX ADMIN — Medication 100 MILLIGRAM(S): at 05:34

## 2017-08-15 RX ADMIN — Medication 100 MILLIEQUIVALENT(S): at 14:16

## 2017-08-15 RX ADMIN — LIDOCAINE 1 PATCH: 4 CREAM TOPICAL at 04:33

## 2017-08-15 NOTE — PROGRESS NOTE ADULT - SUBJECTIVE AND OBJECTIVE BOX
Subjective Complaints:  Historian:   Seen for follow-up because of left sided weakness       REVIEW OF SYSTEMS:  Eyes:  Good vision, no reported pain  ENT:  No sore throat, pain, runny nose, dysphagia  CV:  No pain, palpitatioins, hypo/hypertension  Resp:  No dyspnea, cough, tachypnea, wheezing  GI:  No pain, nausea, vomiting, diarrhea, constipatiion  Muscle:  No pain, weakness  Neuro:  No weakness, tingling, memory problems  Psych:  No fatigue, insomnia, mood problems, depression  Endocrine:  No polyuria, polydypsia, cold/heat intolerance    Vital Signs Last 24 Hrs  T(C): 37 (15 Aug 2017 05:12), Max: 38.1 (14 Aug 2017 16:50)  T(F): 98.6 (15 Aug 2017 05:12), Max: 100.6 (14 Aug 2017 16:50)  HR: 74 (15 Aug 2017 05:12) (64 - 78)  BP: 164/87 (15 Aug 2017 05:12) (127/65 - 166/70)  BP(mean): --  RR: 18 (15 Aug 2017 05:12) (17 - 19)  SpO2: 94% (15 Aug 2017 05:12) (94% - 99%)    GENERAL PHYSICAL EXAM:  General:  Appears stated age, well-groomed, well-nourished, no distress  HEENT:  NC/AT, patent nares w/ pink mucosa, OP clear w/o lesions, PERRL, EOMI, conjunctivae clear, no thyromegaly, nodules, adenopathy, no JVD  Chest:  Full & symmetric excursion, no increased effort, breath sounds clear  Cardiovascular:  Regular rhythm, S1, S2, no murmur/rub/S3/S4, no carotid/femoral/abdominal bruit, radial/pedal pulses 2+, no edema  Abdomen:  Soft, non-tender, non-distended, normoactive bowel sounds, no HSM  Extremities:  Gait & station:   Digits:   Nails:   Joints, Bones, Muscles:   ROM:   Stability:  Skin:  No rash/erythema/ecchymoses/petechiae/wounds/abscess/warm/dry  Musculoskeletal:  Full ROM in all joints w/o swelling/tenderness/effusion    NEUROLOGICAL EXAM:  HENT:  Normocephalic head; atraumatic head.  Neck supple.  ENT: normal looking.  Mental State:    Alert.  Fully oriented to person, place and date.  Coherent.  Speech clear and intact.  Cooperative.  Responds appropriately.    Cranial Nerves:  II-XII:   Pupils round and reactive to light and accommodation.  Extraocular movements full.  Visual fields full (no homonymous hemianopsia).  Visual acuity wnl.  Facial symmetry intact.  Tongue midline.  Motor Functions:  Motor strength is 2/5 in the left upper extremity and 5/5 in the left lower extremity    Sensory Functions:   Intact to touch and pinprick to face and extremities.    Reflexes:  Deep tendon reflexes normoactive to biceps, knees and ankles.  Babinski absent (present).  Cerebellar Testing:    Finger to nose intact.  Nystagmus absent.    LABS:                        11.9   14.0  )-----------( 183      ( 14 Aug 2017 07:46 )             34.0     08-13    141  |  108  |  22  ----------------------------<  137<H>  3.7   |  22  |  1.39<H>    Ca    7.6<L>      13 Aug 2017 13:41    TPro  6.5  /  Alb  2.9<L>  /  TBili  1.0  /  DBili  x   /  AST  77<H>  /  ALT  43  /  AlkPhos  50  08-13    PT/INR - ( 13 Aug 2017 13:41 )   PT: 13.8 sec;   INR: 1.26 ratio         PTT - ( 15 Aug 2017 00:48 )  PTT:64.1 sec        RADIOLOGY & ADDITIONAL STUDIES:  Brain MRI : NO ACUTE INFARCT OR HEMORRHAGE  Provider to RN:       hold heparin for MRI (08-14 @ 10:07)  buPROPion XL .: [Known as WELLBUTRIN XL.]  300 milliGRAM(s), Oral, daily  Special Instructions: home dose- [facility] with [facility].  Administration Instructions: swallow whole * don't crush/chew  This is a Look-alike/Sound-alike Medication (08-14 @ 11:08)  Provider to RN:       pt may go for tests off tele (08-14 @ 11:23)  Provider to RN:       Ok to hold  heparin for MRI only, Please resume immediately at completion of MRI (08-14 @ 11:32)  Activated Partial Thromboplastin Time:  Start Date:14-Aug-2017. STAT (08-14 @ 16:52)  MRI Acute Spinal Cord Compression: Urgent   Indication: left side paralyses  Transport: [facility]  Exam Completed  Provider's Contact #: [phone] (08-14 @ 18:37)  MRI Thoracic Spine w/Cont: Routine   Indication: Abscess, Spine  Transport: [facility]  Cancel Reason: RADIOLOGIST ORDERED  Provider's Contact #: [phone] (08-14 @ 18:37)  MRI Thoracic Spine w/Cont: Routine   Indication: Abscess, Spine  Transport: [facility]  Cancel Reason: RADIOLOGIST ORDERED  Provider's Contact #: [phone] (08-14 @ 18:37)  LORazepam   Injectable: [Ordered as ATIVAN Injectable]  2 milliGRAM(s), IntraMuscular, once, Stop After 1 Doses  Administration Instructions: This is a Look-alike/Sound-alike Medication  Provider's Contact #: [phone] (08-14 @ 19:19)  ciprofloxacin     Tablet: [Ordered as CIPRO    Tablet]  500 milliGRAM(s), Oral, every 12 hours  Indication: osteo  Provider's Contact #: [phone] (08-14 @ 20:26)  clindamycin   Capsule: [Known as CLEOCIN]  300 milliGRAM(s), Oral, every 8 hours  Indication: osteo  Provider's Contact #: [phone] (08-14 @ 20:26)  meropenem IVPB:   2000 milliGRAM(s) in sodium chloride 0.9% 100 milliLiter(s), IV Intermittent, every 8 hours, infuse over 30 Minute(s)  Indication: fever  Provider's Contact #: [phone] (08-14 @ 21:13)  clindamycin IVPB: [Known as CLEOCIN IVPB]  Stat & Then in dextrose 5% 50 milliLiter(s), IV Intermittent, infuse over 30 Minute(s)  Indication: fever  Provider's Contact #: [phone]      900 milliGRAM(s), IV Intermittent, once;      900 milliGRAM(s), IV Intermittent, every 8 hours; (08-14 @ 21:13)  Vancomycin Level, Trough: STAT (08-14 @ 21:13)  Quantiferon - Gold Tuberculosis: AM Sched. Collection: 15-Aug-2017 05:00 (08-14 @ 21:13)  Complete Blood Count: AM Sched. Collection: 15-Aug-2017 05:00 (08-14 @ 21:13)  Comprehensive Metabolic Panel: AM Sched. Collection: 15-Aug-2017 05:00 (08-14 @ 21:13)  clindamycin IVPB: [Known as CLEOCIN IVPB]  900 milliGRAM(s) in dextrose 5% 50 milliLiter(s), IV Intermittent, once, infuse over 30 Minute(s)  Indication: fever  Provider's Contact #: [phone]; (Stat & Then: This is order 1 of 2) (08-14 @ 21:22)  clindamycin IVPB: [Known as CLEOCIN IVPB]  900 milliGRAM(s) in dextrose 5% 50 milliLiter(s), IV Intermittent, every 8 hours, infuse over 30 Minute(s)  Indication: fever  Provider's Contact #: [phone]; (Stat & Then: This is order 2 of 2) (08-14 @ 21:22)  MRI Cervical Spine w/o Cont: Routine   Indication: left sided weakness  Transport: [facility]  Provider's Contact #: [phone] (08-14 @ 21:34)  Activated Partial Thromboplastin Time:  Start Date:14-Aug-2017. STAT (08-14 @ 23:34)  Complete Blood Count: AM Sched. Collection: 15-Aug-2017 05:00  Cancel Reason: Dup Cancel (08-15 @ 00:07)  Activated Partial Thromboplastin Time:  Start Date:15-Aug-2017. STAT (08-15 @ 07:20)  LORazepam   Injectable: [Ordered as ATIVAN Injectable]  2 milliGRAM(s), IntraMuscular, once, Stop After 1 Doses  Special Instructions: 30 mns prior to MRI  Administration Instructions: This is a Look-alike/Sound-alike Medication  Provider's Contact #: [phone] (08-15 @ 08:11)  Assessment : Low grade fever ,confusion, left hemiparesis with unremarkable Brain MRI ,POSSIBILITY OF CERVICAL epidural abscess    Recommendations: MRI of C-spine w/wo gadolenium ,continue iv ANTIBIOTICS   DVT prophylaxis as ordered.  Medications:

## 2017-08-15 NOTE — PROGRESS NOTE ADULT - SUBJECTIVE AND OBJECTIVE BOX
67 y/o M w hx of DM, htn, presents w fever and abd pain x 1 week; reports the pain is in bilateral lower quadrant; denies vomiting; denies diarrhea; denies urinary symptoms; + cough; denies CP/SOB; denies lower ext swelling; no headache or neck stiffness (12 Aug 2017 15:51)  yesterday left  arm paralysis and left  lower extremity weakness   today still febrile   sp mri of cx and thoracic spine   all normal   seen with wife   discussed with several pa"s multiple times all night long     Allergies    No Known Allergies    Intolerances        MEDICATIONS  (STANDING):  aspirin enteric coated 81 milliGRAM(s) Oral daily  ATENolol  Tablet 50 milliGRAM(s) Oral daily  insulin lispro (HumaLOG) corrective regimen sliding scale   SubCutaneous three times a day before meals  dextrose 5%. 1000 milliLiter(s) (50 mL/Hr) IV Continuous <Continuous>  dextrose 50% Injectable 12.5 Gram(s) IV Push once  dextrose 50% Injectable 25 Gram(s) IV Push once  dextrose 50% Injectable 25 Gram(s) IV Push once  sodium chloride 0.9%. 1000 milliLiter(s) (100 mL/Hr) IV Continuous <Continuous>  heparin  Infusion.  Unit(s)/Hr (15 mL/Hr) IV Continuous <Continuous>  lidocaine   Patch 1 Patch Transdermal daily  buPROPion XL . 300 milliGRAM(s) Oral daily  potassium chloride  10 mEq/100 mL IVPB 10 milliEquivalent(s) IV Intermittent every 1 hour  meropenem IVPB 500 milliGRAM(s) IV Intermittent every 8 hours    MEDICATIONS  (PRN):  dextrose Gel 1 Dose(s) Oral once PRN Blood Glucose LESS THAN 70 milliGRAM(s)/deciliter  glucagon  Injectable 1 milliGRAM(s) IntraMuscular once PRN Glucose LESS THAN 70 milligrams/deciliter  heparin  Injectable 6500 Unit(s) IV Push every 6 hours PRN For aPTT less than 40  heparin  Injectable 3000 Unit(s) IV Push every 6 hours PRN For aPTT between 40 - 57  oxyCODONE    5 mG/acetaminophen 325 mG 1 Tablet(s) Oral every 6 hours PRN Moderate Pain (4 - 6)  acetaminophen   Tablet 650 milliGRAM(s) Oral every 6 hours PRN For Temp greater than 38 C (100.4 F)      REVIEW OF SYSTEMS:    unable to obtain today as post mri and sedated  VITAL SIGNS:  T(C): 37.1 (08-15-17 @ 13:57), Max: 38.1 (08-14-17 @ 16:50)  T(F): 98.8 (08-15-17 @ 13:57), Max: 100.6 (08-14-17 @ 16:50)  HR: 60 (08-15-17 @ 13:57) (60 - 74)  BP: 157/88 (08-15-17 @ 13:57) (127/65 - 166/70)  RR: 18 (08-15-17 @ 13:57) (18 - 19)  SpO2: 95% (08-15-17 @ 13:57) (94% - 97%)  Wt(kg): --    PHYSICAL EXAM:    GENERAL: not in any distress  HEENT: Neck is supple, normocephalic, atraumatic   CHEST/LUNG: Clear bilaterally; No rales, rhonchi, wheezing  HEART: Regular rate and rhythm; no change in status of murmurs, rubs, or gallops  ABDOMEN: Soft, Nontender, Nondistended; Bowel sounds present, no rebound   EXTREMITIES:  2+ Peripheral Pulses, No clubbing, cyanosis, or edema  GENITOURINARY: NEGATIVE   SKIN: No rashes or lesions  BACK: no pressor sore   NERVOUS SYSTEM:  still left arm flaccid   PSYCH: normal affect     LABS:                         13.5   11.4  )-----------( 227      ( 15 Aug 2017 08:07 )             37.4     08-15    140  |  107  |  14  ----------------------------<  165<H>  3.2<L>   |  22  |  0.82    Ca    7.6<L>      15 Aug 2017 08:07    TPro  6.5  /  Alb  2.5<L>  /  TBili  0.9  /  DBili  x   /  AST  63<H>  /  ALT  55  /  AlkPhos  53  08-15    LIVER FUNCTIONS - ( 15 Aug 2017 08:07 )  Alb: 2.5 g/dL / Pro: 6.5 gm/dL / ALK PHOS: 53 U/L / ALT: 55 U/L / AST: 63 U/L / GGT: x           PTT - ( 15 Aug 2017 08:33 )  PTT:67.2 sec      CARDIAC MARKERS ( 13 Aug 2017 21:37 )  .389 ng/mL / x     / 4163 U/L / x     / x          Thyroid Stimulating Hormone, Serum: 0.922 uU/mL (08-12 @ 10:51)      Sedimentation Rate, Erythrocyte: 25 mm/hr (08-14 @ 08:08)                          Radiology:

## 2017-08-15 NOTE — PROGRESS NOTE ADULT - SUBJECTIVE AND OBJECTIVE BOX
Patient is a 66y old  Male who presents with a chief complaint of fever, abdominal pain (12 Aug 2017 15:51)      INTERVAL HPI/OVERNIGHT EVENTS:  treated for sepsis- unclear origin, highCPK, troponin and irregular HB  Awaiting MRI of c spine r/o myelopathy,abscess etc    MEDICATIONS  (STANDING):  aspirin enteric coated 81 milliGRAM(s) Oral daily  ATENolol  Tablet 50 milliGRAM(s) Oral daily  insulin lispro (HumaLOG) corrective regimen sliding scale   SubCutaneous three times a day before meals  dextrose 5%. 1000 milliLiter(s) (50 mL/Hr) IV Continuous <Continuous>  dextrose 50% Injectable 12.5 Gram(s) IV Push once  dextrose 50% Injectable 25 Gram(s) IV Push once  dextrose 50% Injectable 25 Gram(s) IV Push once  sodium chloride 0.9%. 1000 milliLiter(s) (100 mL/Hr) IV Continuous <Continuous>  heparin  Infusion.  Unit(s)/Hr (15 mL/Hr) IV Continuous <Continuous>  lidocaine   Patch 1 Patch Transdermal daily  buPROPion XL . 300 milliGRAM(s) Oral daily  meropenem IVPB 2000 milliGRAM(s) IV Intermittent every 8 hours  clindamycin IVPB   IV Intermittent   clindamycin IVPB 900 milliGRAM(s) IV Intermittent every 8 hours  LORazepam   Injectable 2 milliGRAM(s) IntraMuscular once    MEDICATIONS  (PRN):  dextrose Gel 1 Dose(s) Oral once PRN Blood Glucose LESS THAN 70 milliGRAM(s)/deciliter  glucagon  Injectable 1 milliGRAM(s) IntraMuscular once PRN Glucose LESS THAN 70 milligrams/deciliter  heparin  Injectable 6500 Unit(s) IV Push every 6 hours PRN For aPTT less than 40  heparin  Injectable 3000 Unit(s) IV Push every 6 hours PRN For aPTT between 40 - 57  oxyCODONE    5 mG/acetaminophen 325 mG 1 Tablet(s) Oral every 6 hours PRN Moderate Pain (4 - 6)  acetaminophen   Tablet 650 milliGRAM(s) Oral every 6 hours PRN For Temp greater than 38 C (100.4 F)        REVIEW OF SYSTEMS:  CONSTITUTIONAL: No fever, weight loss, or fatigue  EYES: No eye pain, visual disturbances, or discharge  ENMT:  No difficulty hearing, tinnitus, vertigo; No sinus or throat pain  NECK: No pain or stiffness  RESPIRATORY: No cough, wheezing, chills or hemoptysis; No shortness of breath  CARDIOVASCULAR: No chest pain, palpitations, dizziness, or leg swelling  GASTROINTESTINAL: No abdominal or epigastric pain. No nausea, vomiting, or hematemesis; No diarrhea or constipation. No melena or hematochezia.  GENITOURINARY: No dysuria, frequency, hematuria, or incontinence  NEUROLOGICAL: No headaches, memory loss, loss of strength, numbness, or tremors  SKIN: No itching, burning, rashes, or lesions      Vital Signs Last 24 Hrs  T(C): 37 (15 Aug 2017 05:12), Max: 38.1 (14 Aug 2017 16:50)  T(F): 98.6 (15 Aug 2017 05:12), Max: 100.6 (14 Aug 2017 16:50)  HR: 74 (15 Aug 2017 05:12) (64 - 78)  BP: 164/87 (15 Aug 2017 05:12) (127/65 - 166/70)  BP(mean): --  RR: 18 (15 Aug 2017 05:12) (17 - 19)  SpO2: 94% (15 Aug 2017 05:12) (94% - 99%)    PHYSICAL EXAM:  GENERAL: NAD, well-groomed, well-developed  HEAD:  Atraumatic, Normocephalic  EYES: EOMI, PERRLA, conjunctiva and sclera clear  ENMT: No tonsillar erythema, exudates, or enlargement; Moist mucous membranes   NECK: Supple, No JVD   NERVOUS SYSTEM:  Alert & Oriented X3, sleepy but responsive; Motor Strength LUE power 2/5, LLE-3/5  CHEST/LUNG: Clear to percussion bilaterally; No rales, rhonchi, wheezing, or rubs  HEART: Regular rate and rhythm; No murmurs, rubs, or gallops  ABDOMEN: Soft, Nontender, Nondistended; Bowel sounds present  EXTREMITIES:  2+ Peripheral Pulses, No clubbing, cyanosis, or edema  LYMPH: No lymphadenopathy noted  SKIN: No rashes or lesions    LABS:                        13.5   11.4  )-----------( 227      ( 15 Aug 2017 08:07 )             37.4     08-13    141  |  108  |  22  ----------------------------<  137<H>  3.7   |  22  |  1.39<H>    Ca    7.6<L>      13 Aug 2017 13:41    TPro  6.5  /  Alb  2.9<L>  /  TBili  1.0  /  DBili  x   /  AST  77<H>  /  ALT  43  /  AlkPhos  50  08-13    PT/INR - ( 13 Aug 2017 13:41 )   PT: 13.8 sec;   INR: 1.26 ratio         PTT - ( 15 Aug 2017 00:48 )  PTT:64.1 sec    CAPILLARY BLOOD GLUCOSE  170 (15 Aug 2017 07:56)  139 (14 Aug 2017 21:40)  133 (14 Aug 2017 16:28)  151 (14 Aug 2017 12:32)  142 (14 Aug 2017 08:53)          RADIOLOGY & ADDITIONAL TESTS:    Imaging Personally Reviewed:  [ ] YES  [ ] NO    Consultant(s) Notes Reviewed:  [ ] YES  [ ] NO    Care Discussed with Consultants/Other Providers [ ] YES  [ ] NO

## 2017-08-15 NOTE — PROGRESS NOTE ADULT - ASSESSMENT
fever   acute neuro abnormalities   neuro follow up   decreased antibiotics doses aas no longer covering epidural abscess   reevaluate later today re Altered Mental Status likely seaadtion even though follows some commands correctly with eyes closed

## 2017-08-15 NOTE — CHART NOTE - NSCHARTNOTEFT_GEN_A_CORE
[facility]    Called by Dr. Raymond that MRI results were inconclusive, and he asked to call the MRI technician to come in for a repeat. Technician stated that the patient was already in the scanner for 2 hours and could not tolerate the scan. Dr Bauer recommended cervical spine MRI to be done in AM. Everyone aware that test could not be done.

## 2017-08-15 NOTE — PROGRESS NOTE ADULT - SUBJECTIVE AND OBJECTIVE BOX
Patient is a 66y old  Male who presents with a chief complaint of fever, abdominal pain (12 Aug 2017 15:51)        PAST MEDICAL & SURGICAL HISTORY:  Diabetes  HTN (hypertension)      Summary of admission HPI :Afib CVA                PREVIOUS DIAGNOSTIC TESTING:      ECHO  FINDINGS:    STRESS  FINDINGS:    CATHETERIZATION  FINDINGS:    ELECTROPHYSIOLOGY STUDY  FINDINGS:    CAROTID ULTRASOUND:  FINDINGS    VENOUS DUPLEX SCAN:  FINDINGS:    CHEST CT PULMONARY ANGIO with IV Contrast:  FINDINGS:  MEDICATIONS  (STANDING):  aspirin enteric coated 81 milliGRAM(s) Oral daily  ATENolol  Tablet 50 milliGRAM(s) Oral daily  insulin lispro (HumaLOG) corrective regimen sliding scale   SubCutaneous three times a day before meals  dextrose 5%. 1000 milliLiter(s) (50 mL/Hr) IV Continuous <Continuous>  dextrose 50% Injectable 12.5 Gram(s) IV Push once  dextrose 50% Injectable 25 Gram(s) IV Push once  dextrose 50% Injectable 25 Gram(s) IV Push once  sodium chloride 0.9%. 1000 milliLiter(s) (100 mL/Hr) IV Continuous <Continuous>  heparin  Infusion.  Unit(s)/Hr (15 mL/Hr) IV Continuous <Continuous>  lidocaine   Patch 1 Patch Transdermal daily  buPROPion XL . 300 milliGRAM(s) Oral daily  potassium chloride  10 mEq/100 mL IVPB 10 milliEquivalent(s) IV Intermittent every 1 hour  meropenem IVPB 500 milliGRAM(s) IV Intermittent every 8 hours    MEDICATIONS  (PRN):  dextrose Gel 1 Dose(s) Oral once PRN Blood Glucose LESS THAN 70 milliGRAM(s)/deciliter  glucagon  Injectable 1 milliGRAM(s) IntraMuscular once PRN Glucose LESS THAN 70 milligrams/deciliter  heparin  Injectable 6500 Unit(s) IV Push every 6 hours PRN For aPTT less than 40  heparin  Injectable 3000 Unit(s) IV Push every 6 hours PRN For aPTT between 40 - 57  oxyCODONE    5 mG/acetaminophen 325 mG 1 Tablet(s) Oral every 6 hours PRN Moderate Pain (4 - 6)  acetaminophen   Tablet 650 milliGRAM(s) Oral every 6 hours PRN For Temp greater than 38 C (100.4 F)      FAMILY HISTORY:      SOCIAL HISTORY:    CIGARETTES:    ALCOHOL:    REVIEW OF SYSTEMS:    CONSTITUTIONAL: No fever, weight loss, chills, shakes, or fatigue  EYES: No eye pain, visual disturbances, or discharge  ENMT:  No difficulty hearing, tinnitus, vertigo; No sinus or throat pain  NECK: No pain or stiffness  BREASTS: No pain, masses, or nipple discharge  RESPIRATORY: No cough, wheezing, hemoptysis, or shortness of breath  CARDIOVASCULAR: No chest pain, dyspnea, palpitations, dizziness, syncope, paroxysmal nocturnal dyspnea, orthopnea, or arm or leg swelling  GASTROINTESTINAL: No abdominal  or epigastric pain, nausea, vomiting, hematemesis, diarrhea, constipation, melena or bright red blood.  GENITOURINARY: No dysuria, nocturia, hematuria, or urinary incontinence  NEUROLOGICAL: No headaches, memory loss, slurred speech, limb weakness, loss of strength, numbness, or tremors  SKIN: No itching, burning, rashes, or lesions   LYMPH NODES: No enlarged glands  ENDOCRINE: No heat or cold intolerance, or hair loss  MUSCULOSKELETAL: No joint pain or swelling, muscle, back, or extremity pain  PSYCHIATRIC: No depression, anxiety, or difficulty sleeping  HEME/LYMPH: No easy bruising or bleeding gums  ALLERY AND IMMUNOLOGIC: No hives or rash.      Vital Signs Last 24 Hrs  T(C): 37.1 (15 Aug 2017 13:57), Max: 38.1 (14 Aug 2017 16:50)  T(F): 98.8 (15 Aug 2017 13:57), Max: 100.6 (14 Aug 2017 16:50)  HR: 66 (15 Aug 2017 15:48) (60 - 74)  BP: 153/88 (15 Aug 2017 15:48) (127/65 - 166/70)  BP(mean): --  RR: 18 (15 Aug 2017 13:57) (18 - 19)  SpO2: 99% (15 Aug 2017 15:48) (94% - 99%)    PHYSICAL EXAM:    GENERAL: In no apparent distress, well nourished, and hydrated.  HEAD:  Atraumatic, Normocephalic  EYES: EOMI, PERRLA, conjunctiva and sclera clear  ENMT: No tonsillar erythema, exudates, or enlargement; Moist mucous membranes, Good dentition, No lesions  NECK: Supple and normal thyroid.  No JVD or carotid bruit.  Carotid pulse is 2+ bilaterally.  HEART: Regular rate and rhythm; No murmurs, rubs, or gallops.  PULMONARY: Clear to auscultation and perfusion.  No rales, wheezing, or rhonchi bilaterally.  ABDOMEN: Soft, Nontender, Nondistended; Bowel sounds present  EXTREMITIES:  2+ Peripheral Pulses, No clubbing, cyanosis, or edema  LYMPH: No lymphadenopathy noted  NEUROLOGICAL: Grossly nonfocal          INTERPRETATION OF TELEMETRY:    ECG:    I&O's Detail    14 Aug 2017 07:01  -  15 Aug 2017 07:00  --------------------------------------------------------  IN:    heparin  Infusion.: 140 mL    IV PiggyBack: 200 mL    Oral Fluid: 320 mL    sodium chloride 0.9%.: 1000 mL  Total IN: 1660 mL    OUT:    Voided: 800 mL  Total OUT: 800 mL    Total NET: 860 mL          LABS:                        13.5   11.4  )-----------( 227      ( 15 Aug 2017 08:07 )             37.4     08-15    140  |  107  |  14  ----------------------------<  165<H>  3.2<L>   |  22  |  0.82    Ca    7.6<L>      15 Aug 2017 08:07    TPro  6.5  /  Alb  2.5<L>  /  TBili  0.9  /  DBili  x   /  AST  63<H>  /  ALT  55  /  AlkPhos  53  08-15    CARDIAC MARKERS ( 13 Aug 2017 21:37 )  .389 ng/mL / x     / 4163 U/L / x     / x          PTT - ( 15 Aug 2017 08:33 )  PTT:67.2 sec    BNP  I&O's Detail    14 Aug 2017 07:01  -  15 Aug 2017 07:00  --------------------------------------------------------  IN:    heparin  Infusion.: 140 mL    IV PiggyBack: 200 mL    Oral Fluid: 320 mL    sodium chloride 0.9%.: 1000 mL  Total IN: 1660 mL    OUT:    Voided: 800 mL  Total OUT: 800 mL    Total NET: 860 mL        Daily     Daily     RADIOLOGY & ADDITIONAL STUDIES:

## 2017-08-16 LAB
ALBUMIN SERPL ELPH-MCNC: 2.6 G/DL — LOW (ref 3.3–5)
ALP SERPL-CCNC: 55 U/L — SIGNIFICANT CHANGE UP (ref 40–120)
ALT FLD-CCNC: 58 U/L — SIGNIFICANT CHANGE UP (ref 12–78)
ANION GAP SERPL CALC-SCNC: 12 MMOL/L — SIGNIFICANT CHANGE UP (ref 5–17)
APTT BLD: 63.2 SEC — HIGH (ref 27.5–37.4)
AST SERPL-CCNC: 47 U/L — HIGH (ref 15–37)
BILIRUB SERPL-MCNC: 0.6 MG/DL — SIGNIFICANT CHANGE UP (ref 0.2–1.2)
BUN SERPL-MCNC: 17 MG/DL — SIGNIFICANT CHANGE UP (ref 7–23)
CALCIUM SERPL-MCNC: 8 MG/DL — LOW (ref 8.5–10.1)
CHLORIDE SERPL-SCNC: 104 MMOL/L — SIGNIFICANT CHANGE UP (ref 96–108)
CK SERPL-CCNC: 283 U/L — SIGNIFICANT CHANGE UP (ref 26–308)
CO2 SERPL-SCNC: 23 MMOL/L — SIGNIFICANT CHANGE UP (ref 22–31)
CREAT SERPL-MCNC: 0.75 MG/DL — SIGNIFICANT CHANGE UP (ref 0.5–1.3)
GLUCOSE SERPL-MCNC: 161 MG/DL — HIGH (ref 70–99)
HCT VFR BLD CALC: 40.7 % — SIGNIFICANT CHANGE UP (ref 39–50)
HGB BLD-MCNC: 14.2 G/DL — SIGNIFICANT CHANGE UP (ref 13–17)
M TB TUBERC IFN-G BLD QL: -0.02 IU/ML — SIGNIFICANT CHANGE UP
M TB TUBERC IFN-G BLD QL: 0.1 IU/ML — SIGNIFICANT CHANGE UP
M TB TUBERC IFN-G BLD QL: ABNORMAL
MCHC RBC-ENTMCNC: 31.2 PG — SIGNIFICANT CHANGE UP (ref 27–34)
MCHC RBC-ENTMCNC: 35 GM/DL — SIGNIFICANT CHANGE UP (ref 32–36)
MCV RBC AUTO: 89.2 FL — SIGNIFICANT CHANGE UP (ref 80–100)
MITOGEN IGNF BCKGRD COR BLD-ACNC: 0.14 IU/ML — SIGNIFICANT CHANGE UP
PLATELET # BLD AUTO: 214 K/UL — SIGNIFICANT CHANGE UP (ref 150–400)
POTASSIUM SERPL-MCNC: 3.1 MMOL/L — LOW (ref 3.5–5.3)
POTASSIUM SERPL-SCNC: 3.1 MMOL/L — LOW (ref 3.5–5.3)
PROT SERPL-MCNC: 6.7 GM/DL — SIGNIFICANT CHANGE UP (ref 6–8.3)
RBC # BLD: 4.57 M/UL — SIGNIFICANT CHANGE UP (ref 4.2–5.8)
RBC # FLD: 11.3 % — SIGNIFICANT CHANGE UP (ref 11–15)
SODIUM SERPL-SCNC: 139 MMOL/L — SIGNIFICANT CHANGE UP (ref 135–145)
TROPONIN I SERPL-MCNC: 0.06 NG/ML — HIGH (ref 0.01–0.04)
WBC # BLD: 9.5 K/UL — SIGNIFICANT CHANGE UP (ref 3.8–10.5)
WBC # FLD AUTO: 9.5 K/UL — SIGNIFICANT CHANGE UP (ref 3.8–10.5)

## 2017-08-16 PROCEDURE — 70450 CT HEAD/BRAIN W/O DYE: CPT | Mod: 26

## 2017-08-16 RX ORDER — DIPHENHYDRAMINE HCL 50 MG
25 CAPSULE ORAL ONCE
Qty: 0 | Refills: 0 | Status: DISCONTINUED | OUTPATIENT
Start: 2017-08-16 | End: 2017-08-28

## 2017-08-16 RX ORDER — RIVAROXABAN 15 MG-20MG
20 KIT ORAL
Qty: 0 | Refills: 0 | Status: DISCONTINUED | OUTPATIENT
Start: 2017-08-16 | End: 2017-08-28

## 2017-08-16 RX ORDER — RIVAROXABAN 15 MG-20MG
20 KIT ORAL EVERY 24 HOURS
Qty: 0 | Refills: 0 | Status: DISCONTINUED | OUTPATIENT
Start: 2017-08-16 | End: 2017-08-16

## 2017-08-16 RX ADMIN — Medication 1: at 08:01

## 2017-08-16 RX ADMIN — ATENOLOL 50 MILLIGRAM(S): 25 TABLET ORAL at 06:10

## 2017-08-16 RX ADMIN — MEROPENEM 200 MILLIGRAM(S): 1 INJECTION INTRAVENOUS at 21:49

## 2017-08-16 RX ADMIN — LIDOCAINE 1 PATCH: 4 CREAM TOPICAL at 11:44

## 2017-08-16 RX ADMIN — SODIUM CHLORIDE 100 MILLILITER(S): 9 INJECTION INTRAMUSCULAR; INTRAVENOUS; SUBCUTANEOUS at 13:53

## 2017-08-16 RX ADMIN — Medication 1: at 16:12

## 2017-08-16 RX ADMIN — Medication 81 MILLIGRAM(S): at 11:44

## 2017-08-16 RX ADMIN — HEPARIN SODIUM 1600 UNIT(S)/HR: 5000 INJECTION INTRAVENOUS; SUBCUTANEOUS at 07:31

## 2017-08-16 RX ADMIN — BUPROPION HYDROCHLORIDE 300 MILLIGRAM(S): 150 TABLET, EXTENDED RELEASE ORAL at 11:44

## 2017-08-16 RX ADMIN — LIDOCAINE 1 PATCH: 4 CREAM TOPICAL at 00:12

## 2017-08-16 RX ADMIN — RIVAROXABAN 20 MILLIGRAM(S): KIT at 11:44

## 2017-08-16 RX ADMIN — Medication 1: at 11:44

## 2017-08-16 RX ADMIN — MEROPENEM 200 MILLIGRAM(S): 1 INJECTION INTRAVENOUS at 15:46

## 2017-08-16 RX ADMIN — MEROPENEM 200 MILLIGRAM(S): 1 INJECTION INTRAVENOUS at 06:06

## 2017-08-16 NOTE — PROGRESS NOTE ADULT - SUBJECTIVE AND OBJECTIVE BOX
Patient is a 66y old  Male who presents with a chief complaint of fever, abdominal pain (12 Aug 2017 15:51)      INTERVAL HPI/OVERNIGHT EVENTS:  clinically unchanged  left hemiparesis noted  MRI c spine neg    MEDICATIONS  (STANDING):  aspirin enteric coated 81 milliGRAM(s) Oral daily  ATENolol  Tablet 50 milliGRAM(s) Oral daily  insulin lispro (HumaLOG) corrective regimen sliding scale   SubCutaneous three times a day before meals  dextrose 5%. 1000 milliLiter(s) (50 mL/Hr) IV Continuous <Continuous>  dextrose 50% Injectable 12.5 Gram(s) IV Push once  dextrose 50% Injectable 25 Gram(s) IV Push once  dextrose 50% Injectable 25 Gram(s) IV Push once  sodium chloride 0.9%. 1000 milliLiter(s) (100 mL/Hr) IV Continuous <Continuous>  lidocaine   Patch 1 Patch Transdermal daily  buPROPion XL . 300 milliGRAM(s) Oral daily  meropenem IVPB 500 milliGRAM(s) IV Intermittent every 8 hours  rivaroxaban 20 milliGRAM(s) Oral every 24 hours    MEDICATIONS  (PRN):  dextrose Gel 1 Dose(s) Oral once PRN Blood Glucose LESS THAN 70 milliGRAM(s)/deciliter  glucagon  Injectable 1 milliGRAM(s) IntraMuscular once PRN Glucose LESS THAN 70 milligrams/deciliter  oxyCODONE    5 mG/acetaminophen 325 mG 1 Tablet(s) Oral every 6 hours PRN Moderate Pain (4 - 6)  acetaminophen   Tablet 650 milliGRAM(s) Oral every 6 hours PRN For Temp greater than 38 C (100.4 F)        REVIEW OF SYSTEMS:  CONSTITUTIONAL: No fever, weight loss, or fatigue  EYES: No eye pain, visual disturbances, or discharge  ENMT:  No difficulty hearing, tinnitus, vertigo; No sinus or throat pain  NECK: No pain or stiffness  RESPIRATORY: No cough, wheezing, chills or hemoptysis; No shortness of breath  CARDIOVASCULAR: No chest pain, palpitations, dizziness, or leg swelling  GASTROINTESTINAL: No abdominal or epigastric pain. No nausea, vomiting, or hematemesis; No diarrhea or constipation. No melena or hematochezia.  GENITOURINARY: No dysuria, frequency, hematuria, or incontinence  NEUROLOGICAL: No headaches, memory loss, loss of strength, numbness, or tremors  SKIN: No itching, burning, rashes, or lesions      Vital Signs Last 24 Hrs  T(C): 36.6 (16 Aug 2017 04:59), Max: 37.1 (15 Aug 2017 13:57)  T(F): 97.8 (16 Aug 2017 04:59), Max: 98.8 (15 Aug 2017 13:57)  HR: 63 (16 Aug 2017 04:59) (60 - 70)  BP: 142/93 (16 Aug 2017 04:59) (142/93 - 157/88)  BP(mean): --  RR: 18 (16 Aug 2017 04:59) (18 - 18)  SpO2: 97% (16 Aug 2017 04:59) (95% - 99%)    alert responsive  NSR, left hemiparesis  chest - clear    LABS:                        14.2   9.5   )-----------( 214      ( 16 Aug 2017 06:35 )             40.7     08-16    139  |  104  |  17  ----------------------------<  161<H>  3.1<L>   |  23  |  0.75    Ca    8.0<L>      16 Aug 2017 06:35    TPro  6.7  /  Alb  2.6<L>  /  TBili  0.6  /  DBili  x   /  AST  47<H>  /  ALT  58  /  AlkPhos  55  08-16    PTT - ( 16 Aug 2017 06:35 )  PTT:63.2 sec    CAPILLARY BLOOD GLUCOSE  173 (16 Aug 2017 07:58)  158 (15 Aug 2017 23:22)  162 (15 Aug 2017 16:59)  172 (15 Aug 2017 12:19)          RADIOLOGY & ADDITIONAL TESTS:    Imaging Personally Reviewed:  [ ] YES  [ ] NO    Consultant(s) Notes Reviewed:  [ ] YES  [ ] NO    Care Discussed with Consultants/Other Providers [ ] YES  [ ] NO

## 2017-08-16 NOTE — PROGRESS NOTE ADULT - SUBJECTIVE AND OBJECTIVE BOX
HPI:  67 y/o M w hx of DM, htn, presents w fever and abd pain x 1 week; reports the pain is in bilateral lower quadrant; denies vomiting; denies diarrhea; denies urinary symptoms; + cough; denies CP/SOB; denies lower ext swelling; no headache or neck stiffness (12 Aug 2017 15:51)      Allergies    No Known Allergies    Intolerances        MEDICATIONS  (STANDING):  aspirin enteric coated 81 milliGRAM(s) Oral daily  ATENolol  Tablet 50 milliGRAM(s) Oral daily  insulin lispro (HumaLOG) corrective regimen sliding scale   SubCutaneous three times a day before meals  dextrose 5%. 1000 milliLiter(s) (50 mL/Hr) IV Continuous <Continuous>  dextrose 50% Injectable 12.5 Gram(s) IV Push once  dextrose 50% Injectable 25 Gram(s) IV Push once  dextrose 50% Injectable 25 Gram(s) IV Push once  sodium chloride 0.9%. 1000 milliLiter(s) (100 mL/Hr) IV Continuous <Continuous>  lidocaine   Patch 1 Patch Transdermal daily  buPROPion XL . 300 milliGRAM(s) Oral daily  meropenem IVPB 500 milliGRAM(s) IV Intermittent every 8 hours  rivaroxaban 20 milliGRAM(s) Oral <User Schedule>    MEDICATIONS  (PRN):  dextrose Gel 1 Dose(s) Oral once PRN Blood Glucose LESS THAN 70 milliGRAM(s)/deciliter  glucagon  Injectable 1 milliGRAM(s) IntraMuscular once PRN Glucose LESS THAN 70 milligrams/deciliter  oxyCODONE    5 mG/acetaminophen 325 mG 1 Tablet(s) Oral every 6 hours PRN Moderate Pain (4 - 6)  acetaminophen   Tablet 650 milliGRAM(s) Oral every 6 hours PRN For Temp greater than 38 C (100.4 F)      REVIEW OF SYSTEMS:    CONSTITUTIONAL: No fever, chills, weight loss, or fatigue  HEENT: No sore throat, runny nose, ear ache  RESPIRATORY: No cough, wheezing, No shortness of breath  CARDIOVASCULAR: No chest pain, palpitations, dizziness  GASTROINTESTINAL: No abdominal pain. No nausea, vomiting, diarrhea  GENITOURINARY: No dysuria, increase frequency, hematuria, or incontinence  NEUROLOGICAL: No headaches, memory loss, loss of strength, numbness, or tremors, no weakness  EXTREMITY: No pedal edema BLE  SKIN: No itching, burning, rashes, or lesions     VITAL SIGNS:  T(C): 36.8 (08-16-17 @ 16:59), Max: 36.9 (08-16-17 @ 11:29)  T(F): 98.3 (08-16-17 @ 16:59), Max: 98.4 (08-16-17 @ 11:29)  HR: 74 (08-16-17 @ 16:59) (63 - 74)  BP: 139/96 (08-16-17 @ 16:59) (139/96 - 154/89)  RR: 18 (08-16-17 @ 16:59) (16 - 18)  SpO2: 97% (08-16-17 @ 16:59) (96% - 98%)  Wt(kg): --    PHYSICAL EXAM:    GENERAL: not in any distress  HEENT: Neck is supple, normocephalic, atraumatic   CHEST/LUNG: Clear to percussion bilaterally; No rales, rhonchi, wheezing  HEART: Regular rate and rhythm; No murmurs, rubs, or gallops  ABDOMEN: Soft, Nontender, Nondistended; Bowel sounds present, no rebound   EXTREMITIES:  2+ Peripheral Pulses, No clubbing, cyanosis, or edema  GENITOURINARY:   SKIN: No rashes or lesions  BACK: no pressor sore   NERVOUS SYSTEM:  Alert & Oriented X3, Good concentration  PSYCH: normal affect     LABS:                         14.2   9.5   )-----------( 214      ( 16 Aug 2017 06:35 )             40.7     08-16    139  |  104  |  17  ----------------------------<  161<H>  3.1<L>   |  23  |  0.75    Ca    8.0<L>      16 Aug 2017 06:35    TPro  6.7  /  Alb  2.6<L>  /  TBili  0.6  /  DBili  x   /  AST  47<H>  /  ALT  58  /  AlkPhos  55  08-16    LIVER FUNCTIONS - ( 16 Aug 2017 06:35 )  Alb: 2.6 g/dL / Pro: 6.7 gm/dL / ALK PHOS: 55 U/L / ALT: 58 U/L / AST: 47 U/L / GGT: x           PTT - ( 16 Aug 2017 06:35 )  PTT:63.2 sec      CARDIAC MARKERS ( 16 Aug 2017 06:35 )  .064 ng/mL / x     / 283 U/L / x     / x          Thyroid Stimulating Hormone, Serum: 0.922 uU/mL (08-12 @ 10:51)      Sedimentation Rate, Erythrocyte: 25 mm/hr (08-14 @ 08:08)                          Radiology: HPI:  67 y/o M w hx of DM, htn, presents w fever and abd pain x 1 week; reports the pain is in bilateral lower quadrant; denies vomiting; denies diarrhea; denies urinary symptoms; + cough; denies CP/SOB; denies lower ext swelling; no headache or neck stiffness (12 Aug 2017 15:51)  no fevers still with left weakness which is unexplained    Allergies    No Known Allergies    Intolerances        MEDICATIONS  (STANDING):  aspirin enteric coated 81 milliGRAM(s) Oral daily  ATENolol  Tablet 50 milliGRAM(s) Oral daily  insulin lispro (HumaLOG) corrective regimen sliding scale   SubCutaneous three times a day before meals  dextrose 5%. 1000 milliLiter(s) (50 mL/Hr) IV Continuous <Continuous>  dextrose 50% Injectable 12.5 Gram(s) IV Push once  dextrose 50% Injectable 25 Gram(s) IV Push once  dextrose 50% Injectable 25 Gram(s) IV Push once  sodium chloride 0.9%. 1000 milliLiter(s) (100 mL/Hr) IV Continuous <Continuous>  lidocaine   Patch 1 Patch Transdermal daily  buPROPion XL . 300 milliGRAM(s) Oral daily  meropenem IVPB 500 milliGRAM(s) IV Intermittent every 8 hours  rivaroxaban 20 milliGRAM(s) Oral <User Schedule>    MEDICATIONS  (PRN):  dextrose Gel 1 Dose(s) Oral once PRN Blood Glucose LESS THAN 70 milliGRAM(s)/deciliter  glucagon  Injectable 1 milliGRAM(s) IntraMuscular once PRN Glucose LESS THAN 70 milligrams/deciliter  oxyCODONE    5 mG/acetaminophen 325 mG 1 Tablet(s) Oral every 6 hours PRN Moderate Pain (4 - 6)  acetaminophen   Tablet 650 milliGRAM(s) Oral every 6 hours PRN For Temp greater than 38 C (100.4 F)      REVIEW OF SYSTEMS:    CONSTITUTIONAL: No fever, chills, weight loss, or fatigue  resolved fever  HEENT: No sore throat, runny nose, ear ache  RESPIRATORY: No cough, wheezing, No shortness of breath  CARDIOVASCULAR: No chest pain, palpitations, dizziness  GASTROINTESTINAL: No abdominal pain. No nausea, vomiting, diarrhea  GENITOURINARY: No dysuria, increase frequency, hematuria, or incontinence  NEUROLOGICAL: loss of strength left arm and leg  EXTREMITY: No pedal edema BLE  SKIN: No itching, burning, rashes, or lesions     VITAL SIGNS:  T(C): 36.8 (08-16-17 @ 16:59), Max: 36.9 (08-16-17 @ 11:29)  T(F): 98.3 (08-16-17 @ 16:59), Max: 98.4 (08-16-17 @ 11:29)  HR: 74 (08-16-17 @ 16:59) (63 - 74)  BP: 139/96 (08-16-17 @ 16:59) (139/96 - 154/89)  RR: 18 (08-16-17 @ 16:59) (16 - 18)  SpO2: 97% (08-16-17 @ 16:59) (96% - 98%)  Wt(kg): --    PHYSICAL EXAM:    GENERAL: not in any distress  HEENT: Neck is supple, normocephalic, atraumatic   CHEST/LUNG: Clear  bilaterally; No rales, rhonchi, wheezing  HEART: Regular rate and rhythm; No murmurs, rubs, or gallops  ABDOMEN: Soft, Nontender, Nondistended; Bowel sounds present, no rebound   EXTREMITIES:  2+ Peripheral Pulses, No clubbing, cyanosis, or edema  GENITOURINARY: neg  SKIN: No rashes or lesions  BACK: no pressor sore   NERVOUS SYSTEM:  Alert & Oriented X3, Good concentration  left arm 2/5  leg 2-3/5  PSYCH: normal affect     LABS:                         14.2   9.5   )-----------( 214      ( 16 Aug 2017 06:35 )             40.7     08-16    139  |  104  |  17  ----------------------------<  161<H>  3.1<L>   |  23  |  0.75    Ca    8.0<L>      16 Aug 2017 06:35    TPro  6.7  /  Alb  2.6<L>  /  TBili  0.6  /  DBili  x   /  AST  47<H>  /  ALT  58  /  AlkPhos  55  08-16    LIVER FUNCTIONS - ( 16 Aug 2017 06:35 )  Alb: 2.6 g/dL / Pro: 6.7 gm/dL / ALK PHOS: 55 U/L / ALT: 58 U/L / AST: 47 U/L / GGT: x           PTT - ( 16 Aug 2017 06:35 )  PTT:63.2 sec      CARDIAC MARKERS ( 16 Aug 2017 06:35 )  .064 ng/mL / x     / 283 U/L / x     / x          Thyroid Stimulating Hormone, Serum: 0.922 uU/mL (08-12 @ 10:51)      Sedimentation Rate, Erythrocyte: 25 mm/hr (08-14 @ 08:08)                          Radiology:

## 2017-08-16 NOTE — PROGRESS NOTE ADULT - SUBJECTIVE AND OBJECTIVE BOX
Patient is a 66y old  Male who presents with a chief complaint of fever, abdominal pain (12 Aug 2017 15:51)        PAST MEDICAL & SURGICAL HISTORY:  Diabetes  HTN (hypertension)      Summary of admission HPI: CVA AFib NSTEMI                PREVIOUS DIAGNOSTIC TESTING:      ECHO  FINDINGS:    STRESS  FINDINGS:    CATHETERIZATION  FINDINGS:    ELECTROPHYSIOLOGY STUDY  FINDINGS:    CAROTID ULTRASOUND:  FINDINGS    VENOUS DUPLEX SCAN:  FINDINGS:    CHEST CT PULMONARY ANGIO with IV Contrast:  FINDINGS:  MEDICATIONS  (STANDING):  aspirin enteric coated 81 milliGRAM(s) Oral daily  ATENolol  Tablet 50 milliGRAM(s) Oral daily  insulin lispro (HumaLOG) corrective regimen sliding scale   SubCutaneous three times a day before meals  dextrose 5%. 1000 milliLiter(s) (50 mL/Hr) IV Continuous <Continuous>  dextrose 50% Injectable 12.5 Gram(s) IV Push once  dextrose 50% Injectable 25 Gram(s) IV Push once  dextrose 50% Injectable 25 Gram(s) IV Push once  sodium chloride 0.9%. 1000 milliLiter(s) (100 mL/Hr) IV Continuous <Continuous>  lidocaine   Patch 1 Patch Transdermal daily  buPROPion XL . 300 milliGRAM(s) Oral daily  meropenem IVPB 500 milliGRAM(s) IV Intermittent every 8 hours  rivaroxaban 20 milliGRAM(s) Oral <User Schedule>    MEDICATIONS  (PRN):  dextrose Gel 1 Dose(s) Oral once PRN Blood Glucose LESS THAN 70 milliGRAM(s)/deciliter  glucagon  Injectable 1 milliGRAM(s) IntraMuscular once PRN Glucose LESS THAN 70 milligrams/deciliter  oxyCODONE    5 mG/acetaminophen 325 mG 1 Tablet(s) Oral every 6 hours PRN Moderate Pain (4 - 6)  acetaminophen   Tablet 650 milliGRAM(s) Oral every 6 hours PRN For Temp greater than 38 C (100.4 F)  diphenhydrAMINE   Capsule 25 milliGRAM(s) Oral once PRN Insomnia      FAMILY HISTORY:      SOCIAL HISTORY:    CIGARETTES:    ALCOHOL:    REVIEW OF SYSTEMS:    CONSTITUTIONAL: No fever, weight loss, chills, shakes, or fatigue  EYES: No eye pain, visual disturbances, or discharge  ENMT:  No difficulty hearing, tinnitus, vertigo; No sinus or throat pain  NECK: No pain or stiffness  BREASTS: No pain, masses, or nipple discharge  RESPIRATORY: No cough, wheezing, hemoptysis, or shortness of breath  CARDIOVASCULAR: No chest pain, dyspnea, palpitations, dizziness, syncope, paroxysmal nocturnal dyspnea, orthopnea, or arm or leg swelling  GASTROINTESTINAL: No abdominal  or epigastric pain, nausea, vomiting, hematemesis, diarrhea, constipation, melena or bright red blood.  GENITOURINARY: No dysuria, nocturia, hematuria, or urinary incontinence  NEUROLOGICAL: No headaches, memory loss, slurred speech, limb weakness, loss of strength, numbness, or tremors  SKIN: No itching, burning, rashes, or lesions   LYMPH NODES: No enlarged glands  ENDOCRINE: No heat or cold intolerance, or hair loss  MUSCULOSKELETAL: No joint pain or swelling, muscle, back, or extremity pain  PSYCHIATRIC: No depression, anxiety, or difficulty sleeping  HEME/LYMPH: No easy bruising or bleeding gums  ALLERY AND IMMUNOLOGIC: No hives or rash.      Vital Signs Last 24 Hrs  T(C): 36.8 (16 Aug 2017 16:59), Max: 36.9 (16 Aug 2017 11:29)  T(F): 98.3 (16 Aug 2017 16:59), Max: 98.4 (16 Aug 2017 11:29)  HR: 74 (16 Aug 2017 16:59) (63 - 74)  BP: 139/96 (16 Aug 2017 16:59) (139/96 - 154/89)  BP(mean): --  RR: 18 (16 Aug 2017 16:59) (16 - 18)  SpO2: 97% (16 Aug 2017 16:59) (96% - 98%)    PHYSICAL EXAM:    GENERAL: In no apparent distress, well nourished, and hydrated.  HEAD:  Atraumatic, Normocephalic  EYES: EOMI, PERRLA, conjunctiva and sclera clear  ENMT: No tonsillar erythema, exudates, or enlargement; Moist mucous membranes, Good dentition, No lesions  NECK: Supple and normal thyroid.  No JVD or carotid bruit.  Carotid pulse is 2+ bilaterally.  HEART: Regular rate and rhythm; No murmurs, rubs, or gallops.  PULMONARY: Clear to auscultation and perfusion.  No rales, wheezing, or rhonchi bilaterally.  ABDOMEN: Soft, Nontender, Nondistended; Bowel sounds present  EXTREMITIES:  2+ Peripheral Pulses, No clubbing, cyanosis, or edema  LYMPH: No lymphadenopathy noted  NEUROLOGICAL: Grossly nonfocal          INTERPRETATION OF TELEMETRY:    ECG:    I&O's Detail    15 Aug 2017 07:01  -  16 Aug 2017 07:00  --------------------------------------------------------  IN:    heparin  Infusion.: 384 mL    sodium chloride 0.9%.: 2200 mL    Solution: 200 mL    Solution: 300 mL  Total IN: 3084 mL    OUT:    Voided: 200 mL  Total OUT: 200 mL    Total NET: 2884 mL      16 Aug 2017 07:  -  16 Aug 2017 21:38  --------------------------------------------------------  IN:    heparin  Infusion.: 76 mL    sodium chloride 0.9%.: 1100 mL    Solution: 100 mL  Total IN: 1276 mL    OUT:  Total OUT: 0 mL    Total NET: 1276 mL          LABS:                        14.2   9.5   )-----------( 214      ( 16 Aug 2017 06:35 )             40.7     08-16    139  |  104  |  17  ----------------------------<  161<H>  3.1<L>   |  23  |  0.75    Ca    8.0<L>      16 Aug 2017 06:35    TPro  6.7  /  Alb  2.6<L>  /  TBili  0.6  /  DBili  x   /  AST  47<H>  /  ALT  58  /  AlkPhos  55  08-16    CARDIAC MARKERS ( 16 Aug 2017 06:35 )  .064 ng/mL / x     / 283 U/L / x     / x          PTT - ( 16 Aug 2017 06:35 )  PTT:63.2 sec    BNP  I&O's Detail    15 Aug 2017 07:  -  16 Aug 2017 07:00  --------------------------------------------------------  IN:    heparin  Infusion.: 384 mL    sodium chloride 0.9%.: 2200 mL    Solution: 200 mL    Solution: 300 mL  Total IN: 3084 mL    OUT:    Voided: 200 mL  Total OUT: 200 mL    Total NET: 2884 mL      16 Aug 2017 07:01  -  16 Aug 2017 21:38  --------------------------------------------------------  IN:    heparin  Infusion.: 76 mL    sodium chloride 0.9%.: 1100 mL    Solution: 100 mL  Total IN: 1276 mL    OUT:  Total OUT: 0 mL    Total NET: 1276 mL        Daily     Daily Weight in k.9 (16 Aug 2017 04:59)    RADIOLOGY & ADDITIONAL STUDIES:

## 2017-08-16 NOTE — PROGRESS NOTE ADULT - ASSESSMENT
fever   acute neuro abnormalities   neuro follow up   decreased antibiotics doses aas no longer covering epidural abscess   reevaluate later today re Altered Mental Status likely seaadtion even though follows some commands correctly with eyes closed fever   acute neuro abnormalities   neuro follow up   decreased antibiotics doses aas no longer covering epidural abscess   neuro follow up   all culture NEGATIVE so far

## 2017-08-17 LAB
ANION GAP SERPL CALC-SCNC: 9 MMOL/L — SIGNIFICANT CHANGE UP (ref 5–17)
APTT BLD: 30.4 SEC — SIGNIFICANT CHANGE UP (ref 27.5–37.4)
BUN SERPL-MCNC: 27 MG/DL — HIGH (ref 7–23)
CALCIUM SERPL-MCNC: 8.1 MG/DL — LOW (ref 8.5–10.1)
CHLORIDE SERPL-SCNC: 108 MMOL/L — SIGNIFICANT CHANGE UP (ref 96–108)
CO2 SERPL-SCNC: 24 MMOL/L — SIGNIFICANT CHANGE UP (ref 22–31)
CREAT SERPL-MCNC: 0.8 MG/DL — SIGNIFICANT CHANGE UP (ref 0.5–1.3)
CULTURE RESULTS: SIGNIFICANT CHANGE UP
CULTURE RESULTS: SIGNIFICANT CHANGE UP
GLUCOSE SERPL-MCNC: 169 MG/DL — HIGH (ref 70–99)
HCT VFR BLD CALC: 43.8 % — SIGNIFICANT CHANGE UP (ref 39–50)
HGB BLD-MCNC: 15.1 G/DL — SIGNIFICANT CHANGE UP (ref 13–17)
MCHC RBC-ENTMCNC: 30.8 PG — SIGNIFICANT CHANGE UP (ref 27–34)
MCHC RBC-ENTMCNC: 34.5 GM/DL — SIGNIFICANT CHANGE UP (ref 32–36)
MCV RBC AUTO: 89.5 FL — SIGNIFICANT CHANGE UP (ref 80–100)
PLATELET # BLD AUTO: 328 K/UL — SIGNIFICANT CHANGE UP (ref 150–400)
POTASSIUM SERPL-MCNC: 3.6 MMOL/L — SIGNIFICANT CHANGE UP (ref 3.5–5.3)
POTASSIUM SERPL-SCNC: 3.6 MMOL/L — SIGNIFICANT CHANGE UP (ref 3.5–5.3)
RBC # BLD: 4.9 M/UL — SIGNIFICANT CHANGE UP (ref 4.2–5.8)
RBC # FLD: 11.6 % — SIGNIFICANT CHANGE UP (ref 11–15)
SODIUM SERPL-SCNC: 141 MMOL/L — SIGNIFICANT CHANGE UP (ref 135–145)
SPECIMEN SOURCE: SIGNIFICANT CHANGE UP
SPECIMEN SOURCE: SIGNIFICANT CHANGE UP
WBC # BLD: 12 K/UL — HIGH (ref 3.8–10.5)
WBC # FLD AUTO: 12 K/UL — HIGH (ref 3.8–10.5)

## 2017-08-17 PROCEDURE — 99232 SBSQ HOSP IP/OBS MODERATE 35: CPT

## 2017-08-17 RX ORDER — ONDANSETRON 8 MG/1
4 TABLET, FILM COATED ORAL ONCE
Qty: 0 | Refills: 0 | Status: COMPLETED | OUTPATIENT
Start: 2017-08-17 | End: 2017-08-17

## 2017-08-17 RX ADMIN — Medication 1: at 11:18

## 2017-08-17 RX ADMIN — MEROPENEM 200 MILLIGRAM(S): 1 INJECTION INTRAVENOUS at 22:22

## 2017-08-17 RX ADMIN — MEROPENEM 200 MILLIGRAM(S): 1 INJECTION INTRAVENOUS at 14:58

## 2017-08-17 RX ADMIN — ONDANSETRON 4 MILLIGRAM(S): 8 TABLET, FILM COATED ORAL at 22:32

## 2017-08-17 RX ADMIN — RIVAROXABAN 20 MILLIGRAM(S): KIT at 11:17

## 2017-08-17 RX ADMIN — LIDOCAINE 1 PATCH: 4 CREAM TOPICAL at 00:23

## 2017-08-17 RX ADMIN — ATENOLOL 50 MILLIGRAM(S): 25 TABLET ORAL at 05:42

## 2017-08-17 RX ADMIN — SODIUM CHLORIDE 100 MILLILITER(S): 9 INJECTION INTRAMUSCULAR; INTRAVENOUS; SUBCUTANEOUS at 00:24

## 2017-08-17 RX ADMIN — MEROPENEM 200 MILLIGRAM(S): 1 INJECTION INTRAVENOUS at 05:42

## 2017-08-17 RX ADMIN — SODIUM CHLORIDE 100 MILLILITER(S): 9 INJECTION INTRAMUSCULAR; INTRAVENOUS; SUBCUTANEOUS at 11:18

## 2017-08-17 RX ADMIN — Medication 1: at 08:04

## 2017-08-17 RX ADMIN — BUPROPION HYDROCHLORIDE 300 MILLIGRAM(S): 150 TABLET, EXTENDED RELEASE ORAL at 11:18

## 2017-08-17 RX ADMIN — Medication 1: at 17:29

## 2017-08-17 RX ADMIN — LIDOCAINE 1 PATCH: 4 CREAM TOPICAL at 11:18

## 2017-08-17 RX ADMIN — Medication 81 MILLIGRAM(S): at 11:18

## 2017-08-17 NOTE — PROGRESS NOTE BEHAVIORAL HEALTH - SUMMARY
Weakness, and now sedated s/p ativan prior to MRI. No aggression or agitation. Calm, and has been compliant with meds and care.   Care was collaborated with wife who is by his side.  Discussed with wife that PT has recommended STR. Wife ambivalent about it, and wants to speak with the family about it. Discussed the importance of psychiatric follow up in the community darrion psychotherapy. She will let me know at a later time.
Pt is a 65yo , domiciled WM with a history of depression, anxiety and hx of inpt psychiatric hospitalization > 2 years ago. Currently on antidepressant and reports that he has been psychiatrically stable on regimen of Wellbutrin  mg po qdaily. Now with onset of medical illness -  Weakness and abdominal pain.  At this time, pt does not present an acute danger to self/others and acute inpt psychiatric hospitalization is not warranted.   Due to current stressors pt would benefit from outpt psychiatric f/u for support and medication mgmt upon discharge.

## 2017-08-17 NOTE — PROGRESS NOTE BEHAVIORAL HEALTH - NSBHCONSFOLLOWNEEDS_PSY_A_CORE
Patient needs further psychiatric safety assessment prior to discharge
no psychiatric contraindications to discharge

## 2017-08-17 NOTE — PROGRESS NOTE BEHAVIORAL HEALTH - NSBHFUPINTERVALHXFT_PSY_A_CORE
Weak, sleeping s/p ativan 2mg im given prior to MRI. Wife by bed side. States that patient has been weak, and even yesterday was ' in and out of sleep'. Tried to engage the patient in a conversation, but he does not wake up.   Discussed with wife that PT has recommended STR. Wife ambivalent about it, and wants to speak with the family about it. Discussed the importance of psychiatric follow up in the community darrion psychotherapy. She will let me know at a later time.
Pt seen and examined. Chart reviewed. Pt states that he is frustrated by his current medical illness, notes that so far there has been no clear explanation for his current symptoms. States that today he is unable to move his left arm or left leg  - able to wiggle his toes, however unable to lift leg/arm. States that he doesn't understand what is happening to his body.  States that despite his frustration, he does not have any suicidal thoughts - states that he is "just focused on getting better."

## 2017-08-17 NOTE — PROGRESS NOTE BEHAVIORAL HEALTH - NSBHFUPINTERVALCCFT_PSY_A_CORE
No aggression or agitation. MRI brain and spine done. Compliant with testing and medications. No aggression or agitation.
Pt with c/o feeling weak and lethargic

## 2017-08-17 NOTE — PROGRESS NOTE BEHAVIORAL HEALTH - RISK ASSESSMENT
chronic risk factors present, but no overt risk factors
chronic risk factors present - remote hx of SA, previous inpt psychiatric hospitalization, current medical illness

## 2017-08-17 NOTE — PROGRESS NOTE BEHAVIORAL HEALTH - SECONDARY DX1
[facility]    [address]    [facility] [address]    Phone:  [phone]    Fax:  [phone]       Thank You for choosing us for your health care visit. We are glad to serve you and happy to provide you with this summary of your visit. Please help us to ensure we have accurate records. If you find anything that needs to be changed, please let our staff know as soon as possible.          Your Demographic Information     Patient Name Sex Kandi Ace Female 1993       Ethnic Group Patient Race    Not of  or  Origin White      Your Visit Details     Date & Time Provider Department    2017 10:45 AM MALACHI Mercado [facility]      Conditions Discussed Today or Order-Related Diagnoses        Comments    Left acute otitis media    -  Primary       Your Vitals Were     BP Pulse Temp Resp Height Weight    132/80 (BP Location: [facility] Patient Position: Sitting, Cuff Size: Regular) 100 97.8 °F (36.6 °C) (Oral) 24 5' 10.5\" (1.791 m) 195 lb (88.5 kg)    LMP BMI Smoking Status             2017 27.58 kg/m2 Never Smoker         Medications Prescribed or Re-Ordered Today     azithromycin (ZITHROMAX) 500 MG tablet    Sig - Route: Take 1 tablet by mouth daily for 3 days. - Oral    Class: Eprescribe    Pharmacy: [facility] [address] [place] [address] [address] Ph #: [phone]    oxymetazoline-menthol (AFRIN MENTHOL SPRAY) 0.05 % nasal spray    Sig: 3 puffs in each nostril 2x daily for 3 days max. (use for your first 3 days back to work)    Class: Eprescribe    Pharmacy: [facility] [address] [place] [address] [address] Ph #: [phone]    benzonatate (TESSALON PERLES) 200 MG capsule    Sig - Route: Take 1 capsule by mouth every 8 hours as needed for Cough. - Oral    Class: Eprescribe    Pharmacy: [facility] [address] [facility] [address] Ph #:  [phone]      Your Current Medications Are        Disp Refills Start End    traZODONE (DESYREL) 50 MG tablet 90 tablet 0 12/27/2016     Sig: TAKE 1 TABLET BY MOUTH NIGHTLY    Class: Eprescribe    omeprazole (PRILOSEC) 40 MG capsule 90 capsule 1 5/20/2016     Sig - Route: Take 1 capsule by mouth daily. - Oral    Class: Eprescribe    escitalopram (LEXAPRO) 10 MG tablet 90 tablet 1 5/20/2016     Sig - Route: Take 1 tablet by mouth daily. - Oral    Class: Eprescribe    LORazepam (ATIVAN) 0.5 MG tablet 30 tablet 0 5/20/2016     Sig - Route: Take 1 tablet by mouth every 8 hours as needed for Anxiety. - Oral    norethindrone-ethinyl estradiol (NECON 1/35, 28,) 1-35 MG-MCG per tablet 84 tablet 4 4/29/2016 4/29/2017    Sig - Route: Take 1 tablet by mouth daily. - Oral    Class: Eprescribe    azithromycin (ZITHROMAX) 500 MG tablet 3 tablet 0 4/7/2017 4/10/2017    Sig - Route: Take 1 tablet by mouth daily for 3 days. - Oral    Class: Eprescribe    oxymetazoline-menthol (AFRIN MENTHOL SPRAY) 0.05 % nasal spray 15 mL 0 4/7/2017     Sig: 3 puffs in each nostril 2x daily for 3 days max. (use for your first 3 days back to work)    Class: Eprescribe    benzonatate (TESSALON PERLES) 200 MG capsule 30 capsule 0 4/7/2017     Sig - Route: Take 1 capsule by mouth every 8 hours as needed for Cough. - Oral    Class: Eprescribe      Discontinued Medications        Reason for Discontinue    albuterol 108 (90 BASE) MCG/ACT inhaler Therapy Completed    albuterol 108 (90 BASE) MCG/ACT inhaler Therapy Completed    predniSONE (DELTASONE) 20 MG tablet Therapy Completed      Allergies     Penicillins GI UPSET      Immunizations History as of 4/7/2017     Name Date    Tdap 5/20/2016  9:28 AM, 1/1/2005      Problem List as of 4/7/2017     Excessive or frequent menstruation    Dysmenorrhea    Polycystic ovaries    Lactose intolerance    Edmondson's esophagus with dysplasia    Anxiety              Patient Instructions      Middle Ear Infection  (Adult)  You have an infection of the middle ear (the space behind the eardrum). This is also called acute otitis media (AOM). Sometimes it is caused by the common cold. This is because congestion can block the internal passage (eustachian tube) that drains fluid from the middle ear. When the middle ear fills with fluid, bacteria can grow there and cause an infection. Oral antibiotics are used to treat this illness, not ear drops. Symptoms usually start to improve within 1 to 2 days of treatment.    Home care  The following are general care guidelines:  · Finish all of the antibiotic medicine given, even though you may feel better after the first few days.  · You may use acetaminophen or ibuprofen to control pain, unless something else was prescribed. [NOTE: If you have chronic liver or kidney disease or have ever had a stomach ulcer or GI bleeding, talk with your doctor before using these medicines.] Do not give aspirin to anyone under 18 years of age who has a fever. It may cause severe liver damage.  Follow-up care  Follow up with your doctor in 2 weeks if all symptoms have not gotten better, or if hearing doesn't go back to normal within 1 month.  When to seek medical care  Get prompt medical attention if any of the following occur:  · Ear pain gets worse or does not improve after 3 days of treatment  · Unusual drowsiness or confusion  · Neck pain, stiff neck, or headache  · Fluid or blood draining from the ear canal  · Fever of 100.4°F (38°C) or higher after 3 days of antibiotics, or as directed by your health care provider  · Convulsion (seizure)  © 7975-6446 [employer]. [address] [place] [address], [place] [address]. All rights reserved. This information is not intended as a substitute for professional medical care. Always follow your healthcare professional's instructions.              Depressive disorder

## 2017-08-17 NOTE — PROGRESS NOTE BEHAVIORAL HEALTH - DETAILS
WEAKNESS, LUE WEAKNESS, left leg weakness reports abdominal discomfort weakness, inability to move left leg/arm

## 2017-08-17 NOTE — PROGRESS NOTE ADULT - SUBJECTIVE AND OBJECTIVE BOX
INTERVAL HPI/OVERNIGHT EVENTS:      No change in condition.  Appears to still be weak in the left upper extremity.  Cat scan result shows no interval change.    Antimicrobial:  meropenem IVPB 500 milliGRAM(s) IV Intermittent every 8 hours    Cardiovascular:  ATENolol  Tablet 50 milliGRAM(s) Oral daily    Pulmonary:    Hematalogic:  aspirin enteric coated 81 milliGRAM(s) Oral daily  rivaroxaban 20 milliGRAM(s) Oral <User Schedule>    Other:  insulin lispro (HumaLOG) corrective regimen sliding scale   SubCutaneous three times a day before meals  dextrose 5%. 1000 milliLiter(s) IV Continuous <Continuous>  dextrose Gel 1 Dose(s) Oral once PRN  dextrose 50% Injectable 12.5 Gram(s) IV Push once  dextrose 50% Injectable 25 Gram(s) IV Push once  dextrose 50% Injectable 25 Gram(s) IV Push once  glucagon  Injectable 1 milliGRAM(s) IntraMuscular once PRN  sodium chloride 0.9%. 1000 milliLiter(s) IV Continuous <Continuous>  oxyCODONE    5 mG/acetaminophen 325 mG 1 Tablet(s) Oral every 6 hours PRN  lidocaine   Patch 1 Patch Transdermal daily  acetaminophen   Tablet 650 milliGRAM(s) Oral every 6 hours PRN  buPROPion XL . 300 milliGRAM(s) Oral daily  diphenhydrAMINE   Capsule 25 milliGRAM(s) Oral once PRN    aspirin enteric coated 81 milliGRAM(s) Oral daily  ATENolol  Tablet 50 milliGRAM(s) Oral daily  insulin lispro (HumaLOG) corrective regimen sliding scale   SubCutaneous three times a day before meals  dextrose 5%. 1000 milliLiter(s) IV Continuous <Continuous>  dextrose Gel 1 Dose(s) Oral once PRN  dextrose 50% Injectable 12.5 Gram(s) IV Push once  dextrose 50% Injectable 25 Gram(s) IV Push once  dextrose 50% Injectable 25 Gram(s) IV Push once  glucagon  Injectable 1 milliGRAM(s) IntraMuscular once PRN  sodium chloride 0.9%. 1000 milliLiter(s) IV Continuous <Continuous>  oxyCODONE    5 mG/acetaminophen 325 mG 1 Tablet(s) Oral every 6 hours PRN  lidocaine   Patch 1 Patch Transdermal daily  acetaminophen   Tablet 650 milliGRAM(s) Oral every 6 hours PRN  buPROPion XL . 300 milliGRAM(s) Oral daily  meropenem IVPB 500 milliGRAM(s) IV Intermittent every 8 hours  rivaroxaban 20 milliGRAM(s) Oral <User Schedule>  diphenhydrAMINE   Capsule 25 milliGRAM(s) Oral once PRN    Drug Dosing Weight  Height (cm): 182.88 (12 Aug 2017 10:02)  Weight (kg): 83.9 (12 Aug 2017 18:00)  BMI (kg/m2): 25.1 (12 Aug 2017 18:00)  BSA (m2): 2.06 (12 Aug 2017 18:00)        SCALES: [ ] YES [ ] NO    DATE INSERTED:  REMOVE:  [ ] YES [ ] NO  EXPLAIN:      PAST MEDICAL & SURGICAL HISTORY:  Diabetes  HTN (hypertension)      REVIEW OF SYSTEMS:    CONSTITUTIONAL: No fever, weight loss, or fatigue  EYES: No eye pain, visual disturbances, or discharge  ENMT:  No difficulty hearing, tinnitus, vertigo; No sinus or throat pain  NECK: No pain or stiffness  BREASTS: No pain, masses, or nipple discharge  RESPIRATORY: No cough, wheezing, chills or hemoptysis; No shortness of breath  CARDIOVASCULAR: No chest pain, palpitations, dizziness, or leg swelling  GASTROINTESTINAL: No abdominal or epigastric pain. No nausea, vomiting, or hematemesis; No diarrhea or constipation. No melena or hematochezia.  GENITOURINARY: No dysuria, frequency, hematuria, or incontinence  NEUROLOGICAL:Left upper extremity weakness.  SKIN: No itching, burning, rashes, or lesions   LYMPH NODES: No enlarged glands  ENDOCRINE: No heat or cold intolerance; No hair loss  MUSCULOSKELETAL: No joint pain or swelling; No muscle, back, or extremity pain  PSYCHIATRIC: No depression, anxiety, mood swings, or difficulty sleeping  HEME/LYMPH: No easy bruising, or bleeding gums  ALLERGY AND IMMUNOLOGIC: No hives or eczema      T(C): 36.8 (08-17-17 @ 12:27), Max: 36.8 (08-16-17 @ 16:59)  HR: 70 (08-17-17 @ 12:27) (68 - 84)  BP: 153/85 (08-17-17 @ 12:27) (139/96 - 158/98)  RR: 16 (08-17-17 @ 12:27) (16 - 18)  SpO2: 97% (08-17-17 @ 12:27) (96% - 97%)  Wt(kg): --        I&O's Detail    16 Aug 2017 07:01  -  17 Aug 2017 07:00  --------------------------------------------------------  IN:    heparin  Infusion.: 76 mL    sodium chloride 0.9%.: 2300 mL    Solution: 300 mL  Total IN: 2676 mL    OUT:    Voided: 800 mL  Total OUT: 800 mL    Total NET: 1876 mL            PHYSICAL EXAM:    GENERAL: NAD, well-groomed, well-developed  HEAD:  Atraumatic, Normocephalic  EYES: EOMI, PERRLA, conjunctiva and sclera clear  ENMT: No tonsillar erythema, exudates, or enlargement; Moist mucous membranes, Good dentition, No lesions  NECK: Supple, No JVD, Normal thyroid  NERVOUS SYSTEM:  Alert & Oriented X3, Good concentration; Motor Strength 5/5 B/L upper and lower extremities; DTRs 2+ intact and symmetric  CHEST/LUNG: Clear to percussion bilaterally; No rales, rhonchi, wheezing, or rubs  HEART: Regular rate and rhythm; No murmurs, rubs, or gallops  ABDOMEN: Soft, Nontender, Nondistended; Bowel sounds present  EXTREMITIES:  2+ Peripheral Pulses, No clubbing, cyanosis, or edema  LYMPH: No lymphadenopathy noted  SKIN: No rashes or lesions      LABS:  CBC Full  -  ( 17 Aug 2017 08:02 )  WBC Count : 12.0 K/uL  Hemoglobin : 15.1 g/dL  Hematocrit : 43.8 %  Platelet Count - Automated : 328 K/uL  Mean Cell Volume : 89.5 fl  Mean Cell Hemoglobin : 30.8 pg  Mean Cell Hemoglobin Concentration : 34.5 gm/dL  Auto Neutrophil # : x  Auto Lymphocyte # : x  Auto Monocyte # : x  Auto Eosinophil # : x  Auto Basophil # : x  Auto Neutrophil % : x  Auto Lymphocyte % : x  Auto Monocyte % : x  Auto Eosinophil % : x  Auto Basophil % : x    08-17    141  |  108  |  27<H>  ----------------------------<  169<H>  3.6   |  24  |  0.80    Ca    8.1<L>      17 Aug 2017 10:07    TPro  6.7  /  Alb  2.6<L>  /  TBili  0.6  /  DBili  x   /  AST  47<H>  /  ALT  58  /  AlkPhos  55  08-16    PTT - ( 17 Aug 2017 08:02 )  PTT:30.4 sec        RADIOLOGY & ADDITIONAL STUDIES:

## 2017-08-17 NOTE — PROGRESS NOTE ADULT - SUBJECTIVE AND OBJECTIVE BOX
Patient is a 66y old  Male who presents with a chief complaint of fever, abdominal pain (12 Aug 2017 15:51)        PAST MEDICAL & SURGICAL HISTORY:  Diabetes  HTN (hypertension)      Summary of admission HPI: NSTEMI CVA AFib                PREVIOUS DIAGNOSTIC TESTING:      ECHO  FINDINGS:    STRESS  FINDINGS:    CATHETERIZATION  FINDINGS:    ELECTROPHYSIOLOGY STUDY  FINDINGS:    CAROTID ULTRASOUND:  FINDINGS    VENOUS DUPLEX SCAN:  FINDINGS:    CHEST CT PULMONARY ANGIO with IV Contrast:  FINDINGS:  MEDICATIONS  (STANDING):  aspirin enteric coated 81 milliGRAM(s) Oral daily  ATENolol  Tablet 50 milliGRAM(s) Oral daily  insulin lispro (HumaLOG) corrective regimen sliding scale   SubCutaneous three times a day before meals  dextrose 5%. 1000 milliLiter(s) (50 mL/Hr) IV Continuous <Continuous>  dextrose 50% Injectable 12.5 Gram(s) IV Push once  dextrose 50% Injectable 25 Gram(s) IV Push once  dextrose 50% Injectable 25 Gram(s) IV Push once  sodium chloride 0.9%. 1000 milliLiter(s) (100 mL/Hr) IV Continuous <Continuous>  lidocaine   Patch 1 Patch Transdermal daily  buPROPion XL . 300 milliGRAM(s) Oral daily  meropenem IVPB 500 milliGRAM(s) IV Intermittent every 8 hours  rivaroxaban 20 milliGRAM(s) Oral <User Schedule>    MEDICATIONS  (PRN):  dextrose Gel 1 Dose(s) Oral once PRN Blood Glucose LESS THAN 70 milliGRAM(s)/deciliter  glucagon  Injectable 1 milliGRAM(s) IntraMuscular once PRN Glucose LESS THAN 70 milligrams/deciliter  oxyCODONE    5 mG/acetaminophen 325 mG 1 Tablet(s) Oral every 6 hours PRN Moderate Pain (4 - 6)  acetaminophen   Tablet 650 milliGRAM(s) Oral every 6 hours PRN For Temp greater than 38 C (100.4 F)  diphenhydrAMINE   Capsule 25 milliGRAM(s) Oral once PRN Insomnia      FAMILY HISTORY:      SOCIAL HISTORY:    CIGARETTES:    ALCOHOL:    REVIEW OF SYSTEMS:    CONSTITUTIONAL: No fever, weight loss, chills, shakes, or fatigue  EYES: No eye pain, visual disturbances, or discharge  ENMT:  No difficulty hearing, tinnitus, vertigo; No sinus or throat pain  NECK: No pain or stiffness  BREASTS: No pain, masses, or nipple discharge  RESPIRATORY: No cough, wheezing, hemoptysis, or shortness of breath  CARDIOVASCULAR: No chest pain, dyspnea, palpitations, dizziness, syncope, paroxysmal nocturnal dyspnea, orthopnea, or arm or leg swelling  GASTROINTESTINAL: No abdominal  or epigastric pain, nausea, vomiting, hematemesis, diarrhea, constipation, melena or bright red blood.  GENITOURINARY: No dysuria, nocturia, hematuria, or urinary incontinence  NEUROLOGICAL: No headaches, memory loss, slurred speech, limb weakness, loss of strength, numbness, or tremors  SKIN: No itching, burning, rashes, or lesions   LYMPH NODES: No enlarged glands  ENDOCRINE: No heat or cold intolerance, or hair loss  MUSCULOSKELETAL: No joint pain or swelling, muscle, back, or extremity pain  PSYCHIATRIC: No depression, anxiety, or difficulty sleeping  HEME/LYMPH: No easy bruising or bleeding gums  ALLERY AND IMMUNOLOGIC: No hives or rash.      Vital Signs Last 24 Hrs  T(C): 36.7 (17 Aug 2017 05:04), Max: 36.9 (16 Aug 2017 11:29)  T(F): 98 (17 Aug 2017 05:04), Max: 98.4 (16 Aug 2017 11:29)  HR: 68 (17 Aug 2017 05:04) (64 - 84)  BP: 149/91 (17 Aug 2017 05:04) (139/96 - 158/98)  BP(mean): --  RR: 17 (17 Aug 2017 05:04) (16 - 18)  SpO2: 96% (17 Aug 2017 05:04) (96% - 98%)    PHYSICAL EXAM:    GENERAL: In no apparent distress, well nourished, and hydrated.  HEAD:  Atraumatic, Normocephalic  EYES: EOMI, PERRLA, conjunctiva and sclera clear  ENMT: No tonsillar erythema, exudates, or enlargement; Moist mucous membranes, Good dentition, No lesions  NECK: Supple and normal thyroid.  No JVD or carotid bruit.  Carotid pulse is 2+ bilaterally.  HEART: Regular rate and rhythm; No murmurs, rubs, or gallops.  PULMONARY: Clear to auscultation and perfusion.  No rales, wheezing, or rhonchi bilaterally.  ABDOMEN: Soft, Nontender, Nondistended; Bowel sounds present  EXTREMITIES:  2+ Peripheral Pulses, No clubbing, cyanosis, or edema  LYMPH: No lymphadenopathy noted  NEUROLOGICAL: Grossly nonfocal          INTERPRETATION OF TELEMETRY:    ECG:    I&O's Detail    16 Aug 2017 07:01  -  17 Aug 2017 07:00  --------------------------------------------------------  IN:    heparin  Infusion.: 76 mL    sodium chloride 0.9%.: 2300 mL    Solution: 300 mL  Total IN: 2676 mL    OUT:    Voided: 800 mL  Total OUT: 800 mL    Total NET: 1876 mL          LABS:                        15.1   12.0  )-----------( 328      ( 17 Aug 2017 08:02 )             43.8     08-16    139  |  104  |  17  ----------------------------<  161<H>  3.1<L>   |  23  |  0.75    Ca    8.0<L>      16 Aug 2017 06:35    TPro  6.7  /  Alb  2.6<L>  /  TBili  0.6  /  DBili  x   /  AST  47<H>  /  ALT  58  /  AlkPhos  55  08-16    CARDIAC MARKERS ( 16 Aug 2017 06:35 )  .064 ng/mL / x     / 283 U/L / x     / x          PTT - ( 17 Aug 2017 08:02 )  PTT:30.4 sec    BNP  I&O's Detail    16 Aug 2017 07:01  -  17 Aug 2017 07:00  --------------------------------------------------------  IN:    heparin  Infusion.: 76 mL    sodium chloride 0.9%.: 2300 mL    Solution: 300 mL  Total IN: 2676 mL    OUT:    Voided: 800 mL  Total OUT: 800 mL    Total NET: 1876 mL        Daily     Daily     RADIOLOGY & ADDITIONAL STUDIES:

## 2017-08-17 NOTE — PROGRESS NOTE ADULT - ASSESSMENT
fever   acute neuro abnormalities   neuro follow up   decreased antibiotics doses aas no longer covering epidural abscess   neuro follow up   all culture NEGATIVE so far

## 2017-08-17 NOTE — PROGRESS NOTE ADULT - SUBJECTIVE AND OBJECTIVE BOX
HPI:  67 y/o M w hx of DM, htn, presents w fever and abd pain x 1 week; reports the pain is in bilateral lower quadrant; denies vomiting; denies diarrhea; denies urinary symptoms; + cough; denies CP/SOB; denies lower ext swelling; no headache or neck stiffness (12 Aug 2017 15:51)      Allergies    No Known Allergies    Intolerances        MEDICATIONS  (STANDING):  aspirin enteric coated 81 milliGRAM(s) Oral daily  ATENolol  Tablet 50 milliGRAM(s) Oral daily  insulin lispro (HumaLOG) corrective regimen sliding scale   SubCutaneous three times a day before meals  dextrose 5%. 1000 milliLiter(s) (50 mL/Hr) IV Continuous <Continuous>  dextrose 50% Injectable 12.5 Gram(s) IV Push once  dextrose 50% Injectable 25 Gram(s) IV Push once  dextrose 50% Injectable 25 Gram(s) IV Push once  sodium chloride 0.9%. 1000 milliLiter(s) (100 mL/Hr) IV Continuous <Continuous>  lidocaine   Patch 1 Patch Transdermal daily  buPROPion XL . 300 milliGRAM(s) Oral daily  meropenem IVPB 500 milliGRAM(s) IV Intermittent every 8 hours  rivaroxaban 20 milliGRAM(s) Oral <User Schedule>    MEDICATIONS  (PRN):  dextrose Gel 1 Dose(s) Oral once PRN Blood Glucose LESS THAN 70 milliGRAM(s)/deciliter  glucagon  Injectable 1 milliGRAM(s) IntraMuscular once PRN Glucose LESS THAN 70 milligrams/deciliter  oxyCODONE    5 mG/acetaminophen 325 mG 1 Tablet(s) Oral every 6 hours PRN Moderate Pain (4 - 6)  acetaminophen   Tablet 650 milliGRAM(s) Oral every 6 hours PRN For Temp greater than 38 C (100.4 F)  diphenhydrAMINE   Capsule 25 milliGRAM(s) Oral once PRN Insomnia      REVIEW OF SYSTEMS:    CONSTITUTIONAL: No fever, chills, weight loss, or fatigue  HEENT: No sore throat, runny nose, ear ache  RESPIRATORY: No cough, wheezing, No shortness of breath  CARDIOVASCULAR: No chest pain, palpitations, dizziness  GASTROINTESTINAL: No abdominal pain. No nausea, vomiting, diarrhea  GENITOURINARY: No dysuria, increase frequency, hematuria, or incontinence  NEUROLOGICAL: No headaches, memory loss, loss of strength, numbness, or tremors, no weakness  EXTREMITY: No pedal edema BLE  SKIN: No itching, burning, rashes, or lesions     VITAL SIGNS:  T(C): 36.4 (08-17-17 @ 18:05), Max: 36.8 (08-16-17 @ 23:54)  T(F): 97.6 (08-17-17 @ 18:05), Max: 98.2 (08-16-17 @ 23:54)  HR: 68 (08-17-17 @ 18:05) (68 - 84)  BP: 144/90 (08-17-17 @ 18:05) (144/90 - 158/98)  RR: 17 (08-17-17 @ 18:05) (16 - 18)  SpO2: 95% (08-17-17 @ 18:05) (95% - 97%)  Wt(kg): --    PHYSICAL EXAM:    GENERAL: not in any distress  HEENT: Neck is supple, normocephalic, atraumatic   CHEST/LUNG: Clear to percussion bilaterally; No rales, rhonchi, wheezing  HEART: Regular rate and rhythm; No murmurs, rubs, or gallops  ABDOMEN: Soft, Nontender, Nondistended; Bowel sounds present, no rebound   EXTREMITIES:  2+ Peripheral Pulses, No clubbing, cyanosis, or edema  GENITOURINARY:   SKIN: No rashes or lesions  BACK: no pressor sore   NERVOUS SYSTEM:  Alert & Oriented X3, Good concentration  PSYCH: normal affect     LABS:                         15.1   12.0  )-----------( 328      ( 17 Aug 2017 08:02 )             43.8     08-17    141  |  108  |  27<H>  ----------------------------<  169<H>  3.6   |  24  |  0.80    Ca    8.1<L>      17 Aug 2017 10:07    TPro  6.7  /  Alb  2.6<L>  /  TBili  0.6  /  DBili  x   /  AST  47<H>  /  ALT  58  /  AlkPhos  55  08-16    LIVER FUNCTIONS - ( 16 Aug 2017 06:35 )  Alb: 2.6 g/dL / Pro: 6.7 gm/dL / ALK PHOS: 55 U/L / ALT: 58 U/L / AST: 47 U/L / GGT: x           PTT - ( 17 Aug 2017 08:02 )  PTT:30.4 sec      CARDIAC MARKERS ( 16 Aug 2017 06:35 )  .064 ng/mL / x     / 283 U/L / x     / x              Sedimentation Rate, Erythrocyte: 25 mm/hr (08-14 @ 08:08)                          Radiology: HPI:  65 y/o M w hx of DM, htn, presents w fever and abd pain x 1 week; reports the pain is in bilateral lower quadrant; denies vomiting; denies diarrhea; denies urinary symptoms; + cough; denies CP/SOB; denies lower ext swelling; no headache or neck stiffness (12 Aug 2017 15:51)  along with fever left weakness  mri ct NEGATIVE   discussed with dr coronado; he will see today   Allergies    No Known Allergies    Intolerances        MEDICATIONS  (STANDING):  aspirin enteric coated 81 milliGRAM(s) Oral daily  ATENolol  Tablet 50 milliGRAM(s) Oral daily  insulin lispro (HumaLOG) corrective regimen sliding scale   SubCutaneous three times a day before meals  dextrose 5%. 1000 milliLiter(s) (50 mL/Hr) IV Continuous <Continuous>  dextrose 50% Injectable 12.5 Gram(s) IV Push once  dextrose 50% Injectable 25 Gram(s) IV Push once  dextrose 50% Injectable 25 Gram(s) IV Push once  sodium chloride 0.9%. 1000 milliLiter(s) (100 mL/Hr) IV Continuous <Continuous>  lidocaine   Patch 1 Patch Transdermal daily  buPROPion XL . 300 milliGRAM(s) Oral daily  meropenem IVPB 500 milliGRAM(s) IV Intermittent every 8 hours  rivaroxaban 20 milliGRAM(s) Oral <User Schedule>    MEDICATIONS  (PRN):  dextrose Gel 1 Dose(s) Oral once PRN Blood Glucose LESS THAN 70 milliGRAM(s)/deciliter  glucagon  Injectable 1 milliGRAM(s) IntraMuscular once PRN Glucose LESS THAN 70 milligrams/deciliter  oxyCODONE    5 mG/acetaminophen 325 mG 1 Tablet(s) Oral every 6 hours PRN Moderate Pain (4 - 6)  acetaminophen   Tablet 650 milliGRAM(s) Oral every 6 hours PRN For Temp greater than 38 C (100.4 F)  diphenhydrAMINE   Capsule 25 milliGRAM(s) Oral once PRN Insomnia      REVIEW OF SYSTEMS:  left weakness ; otherwise wants out of here  CONSTITUTIONAL: No fever, chills, weight loss, or fatigue  HEENT: No sore throat, runny nose, ear ache  RESPIRATORY: No cough, wheezing, No shortness of breath  CARDIOVASCULAR: No chest pain, palpitations, dizziness  GASTROINTESTINAL: No abdominal pain. No nausea, vomiting, diarrhea  GENITOURINARY: No dysuria, increase frequency, hematuria, or incontinence  NEUROLOGICAL: No headaches, memory loss,  has left sided  loss of strength,  EXTREMITY: No pedal edema BLE  SKIN: No itching, burning, rashes, or lesions     VITAL SIGNS:  T(C): 36.4 (08-17-17 @ 18:05), Max: 36.8 (08-16-17 @ 23:54)  T(F): 97.6 (08-17-17 @ 18:05), Max: 98.2 (08-16-17 @ 23:54)  HR: 68 (08-17-17 @ 18:05) (68 - 84)  BP: 144/90 (08-17-17 @ 18:05) (144/90 - 158/98)  RR: 17 (08-17-17 @ 18:05) (16 - 18)  SpO2: 95% (08-17-17 @ 18:05) (95% - 97%)  Wt(kg): --    PHYSICAL EXAM:    GENERAL: not in any distress  HEENT: Neck is supple, normocephalic, atraumatic   CHEST/LUNG: Clear to percussion bilaterally; No rales, rhonchi, wheezing  HEART: Regular rate and rhythm; No murmurs, rubs, or gallops  ABDOMEN: Soft, Nontender, Nondistended; Bowel sounds present, no rebound   EXTREMITIES:  2+ Peripheral Pulses, No clubbing, cyanosis, or edema   SKIN: No rashes or lesions  BACK: no pressor sore   NERVOUS SYSTEM:  Alert & Oriented X3, Good concentration  left weakness   threatening to leave   wife by bedside  PSYCH: normal affect     LABS:                         15.1   12.0  )-----------( 328      ( 17 Aug 2017 08:02 )             43.8     08-17    141  |  108  |  27<H>  ----------------------------<  169<H>  3.6   |  24  |  0.80    Ca    8.1<L>      17 Aug 2017 10:07    TPro  6.7  /  Alb  2.6<L>  /  TBili  0.6  /  DBili  x   /  AST  47<H>  /  ALT  58  /  AlkPhos  55  08-16    LIVER FUNCTIONS - ( 16 Aug 2017 06:35 )  Alb: 2.6 g/dL / Pro: 6.7 gm/dL / ALK PHOS: 55 U/L / ALT: 58 U/L / AST: 47 U/L / GGT: x           PTT - ( 17 Aug 2017 08:02 )  PTT:30.4 sec      CARDIAC MARKERS ( 16 Aug 2017 06:35 )  .064 ng/mL / x     / 283 U/L / x     / x              Sedimentation Rate, Erythrocyte: 25 mm/hr (08-14 @ 08:08)                          Radiology:

## 2017-08-18 DIAGNOSIS — K56.69 OTHER INTESTINAL OBSTRUCTION: ICD-10-CM

## 2017-08-18 DIAGNOSIS — I63.9 CEREBRAL INFARCTION, UNSPECIFIED: ICD-10-CM

## 2017-08-18 DIAGNOSIS — K56.7 ILEUS, UNSPECIFIED: ICD-10-CM

## 2017-08-18 LAB
ALBUMIN SERPL ELPH-MCNC: 2.5 G/DL — LOW (ref 3.3–5)
ALP SERPL-CCNC: 54 U/L — SIGNIFICANT CHANGE UP (ref 40–120)
ALT FLD-CCNC: 56 U/L — SIGNIFICANT CHANGE UP (ref 12–78)
AMYLASE P1 CFR SERPL: 32 U/L — SIGNIFICANT CHANGE UP (ref 25–115)
ANION GAP SERPL CALC-SCNC: 12 MMOL/L — SIGNIFICANT CHANGE UP (ref 5–17)
AST SERPL-CCNC: 37 U/L — SIGNIFICANT CHANGE UP (ref 15–37)
BASOPHILS # BLD AUTO: 0.1 K/UL — SIGNIFICANT CHANGE UP (ref 0–0.2)
BASOPHILS NFR BLD AUTO: 0.9 % — SIGNIFICANT CHANGE UP (ref 0–2)
BILIRUB SERPL-MCNC: 0.6 MG/DL — SIGNIFICANT CHANGE UP (ref 0.2–1.2)
BUN SERPL-MCNC: 31 MG/DL — HIGH (ref 7–23)
CALCIUM SERPL-MCNC: 8.1 MG/DL — LOW (ref 8.5–10.1)
CHLORIDE SERPL-SCNC: 108 MMOL/L — SIGNIFICANT CHANGE UP (ref 96–108)
CK MB BLD-MCNC: 2.8 % — SIGNIFICANT CHANGE UP (ref 0–3.5)
CK MB CFR SERPL CALC: 2.1 NG/ML — SIGNIFICANT CHANGE UP (ref 0.5–3.6)
CK SERPL-CCNC: 74 U/L — SIGNIFICANT CHANGE UP (ref 26–308)
CO2 SERPL-SCNC: 24 MMOL/L — SIGNIFICANT CHANGE UP (ref 22–31)
CREAT SERPL-MCNC: 0.81 MG/DL — SIGNIFICANT CHANGE UP (ref 0.5–1.3)
CULTURE RESULTS: SIGNIFICANT CHANGE UP
EOSINOPHIL # BLD AUTO: 0 K/UL — SIGNIFICANT CHANGE UP (ref 0–0.5)
EOSINOPHIL NFR BLD AUTO: 0.3 % — SIGNIFICANT CHANGE UP (ref 0–6)
GLUCOSE SERPL-MCNC: 182 MG/DL — HIGH (ref 70–99)
HCT VFR BLD CALC: 45.1 % — SIGNIFICANT CHANGE UP (ref 39–50)
HCT VFR BLD CALC: 47.3 % — SIGNIFICANT CHANGE UP (ref 39–50)
HGB BLD-MCNC: 15.8 G/DL — SIGNIFICANT CHANGE UP (ref 13–17)
HGB BLD-MCNC: 16.1 G/DL — SIGNIFICANT CHANGE UP (ref 13–17)
LACTATE SERPL-SCNC: 1.5 MMOL/L — SIGNIFICANT CHANGE UP (ref 0.7–2)
LACTATE SERPL-SCNC: 1.7 MMOL/L — SIGNIFICANT CHANGE UP (ref 0.7–2)
LIDOCAIN IGE QN: 240 U/L — SIGNIFICANT CHANGE UP (ref 73–393)
LYMPHOCYTES # BLD AUTO: 1 K/UL — SIGNIFICANT CHANGE UP (ref 1–3.3)
LYMPHOCYTES # BLD AUTO: 6.6 % — LOW (ref 13–44)
MCHC RBC-ENTMCNC: 30.5 PG — SIGNIFICANT CHANGE UP (ref 27–34)
MCHC RBC-ENTMCNC: 30.8 PG — SIGNIFICANT CHANGE UP (ref 27–34)
MCHC RBC-ENTMCNC: 34 GM/DL — SIGNIFICANT CHANGE UP (ref 32–36)
MCHC RBC-ENTMCNC: 35.1 GM/DL — SIGNIFICANT CHANGE UP (ref 32–36)
MCV RBC AUTO: 87.9 FL — SIGNIFICANT CHANGE UP (ref 80–100)
MCV RBC AUTO: 89.8 FL — SIGNIFICANT CHANGE UP (ref 80–100)
MONOCYTES # BLD AUTO: 0.8 K/UL — SIGNIFICANT CHANGE UP (ref 0–0.9)
MONOCYTES NFR BLD AUTO: 5.4 % — SIGNIFICANT CHANGE UP (ref 2–14)
NEUTROPHILS # BLD AUTO: 12.6 K/UL — HIGH (ref 1.8–7.4)
NEUTROPHILS NFR BLD AUTO: 86.8 % — HIGH (ref 43–77)
PLATELET # BLD AUTO: 432 K/UL — HIGH (ref 150–400)
PLATELET # BLD AUTO: 440 K/UL — HIGH (ref 150–400)
POTASSIUM SERPL-MCNC: 3.6 MMOL/L — SIGNIFICANT CHANGE UP (ref 3.5–5.3)
POTASSIUM SERPL-SCNC: 3.6 MMOL/L — SIGNIFICANT CHANGE UP (ref 3.5–5.3)
PROT SERPL-MCNC: 6.7 GM/DL — SIGNIFICANT CHANGE UP (ref 6–8.3)
RBC # BLD: 5.13 M/UL — SIGNIFICANT CHANGE UP (ref 4.2–5.8)
RBC # BLD: 5.27 M/UL — SIGNIFICANT CHANGE UP (ref 4.2–5.8)
RBC # FLD: 11.8 % — SIGNIFICANT CHANGE UP (ref 11–15)
RBC # FLD: 11.9 % — SIGNIFICANT CHANGE UP (ref 11–15)
SODIUM SERPL-SCNC: 144 MMOL/L — SIGNIFICANT CHANGE UP (ref 135–145)
SPECIMEN SOURCE: SIGNIFICANT CHANGE UP
TROPONIN I SERPL-MCNC: 0.02 NG/ML — SIGNIFICANT CHANGE UP (ref 0.01–0.04)
WBC # BLD: 14.3 K/UL — HIGH (ref 3.8–10.5)
WBC # BLD: 14.6 K/UL — HIGH (ref 3.8–10.5)
WBC # FLD AUTO: 14.3 K/UL — HIGH (ref 3.8–10.5)
WBC # FLD AUTO: 14.6 K/UL — HIGH (ref 3.8–10.5)

## 2017-08-18 PROCEDURE — 71010: CPT | Mod: 26

## 2017-08-18 PROCEDURE — 74177 CT ABD & PELVIS W/CONTRAST: CPT | Mod: 26

## 2017-08-18 PROCEDURE — 74000: CPT | Mod: 26,76

## 2017-08-18 RX ORDER — ONDANSETRON 8 MG/1
4 TABLET, FILM COATED ORAL EVERY 6 HOURS
Qty: 0 | Refills: 0 | Status: DISCONTINUED | OUTPATIENT
Start: 2017-08-18 | End: 2017-08-28

## 2017-08-18 RX ORDER — IOHEXOL 300 MG/ML
30 INJECTION, SOLUTION INTRAVENOUS ONCE
Qty: 0 | Refills: 0 | Status: COMPLETED | OUTPATIENT
Start: 2017-08-18 | End: 2017-08-18

## 2017-08-18 RX ORDER — BENZOCAINE AND MENTHOL 5; 1 G/100ML; G/100ML
1 LIQUID ORAL EVERY 6 HOURS
Qty: 0 | Refills: 0 | Status: DISCONTINUED | OUTPATIENT
Start: 2017-08-18 | End: 2017-08-28

## 2017-08-18 RX ORDER — IPRATROPIUM/ALBUTEROL SULFATE 18-103MCG
3 AEROSOL WITH ADAPTER (GRAM) INHALATION ONCE
Qty: 0 | Refills: 0 | Status: COMPLETED | OUTPATIENT
Start: 2017-08-18 | End: 2017-08-18

## 2017-08-18 RX ADMIN — SODIUM CHLORIDE 100 MILLILITER(S): 9 INJECTION INTRAMUSCULAR; INTRAVENOUS; SUBCUTANEOUS at 06:25

## 2017-08-18 RX ADMIN — IOHEXOL 30 MILLILITER(S): 300 INJECTION, SOLUTION INTRAVENOUS at 12:46

## 2017-08-18 RX ADMIN — BUPROPION HYDROCHLORIDE 300 MILLIGRAM(S): 150 TABLET, EXTENDED RELEASE ORAL at 12:47

## 2017-08-18 RX ADMIN — MEROPENEM 200 MILLIGRAM(S): 1 INJECTION INTRAVENOUS at 14:55

## 2017-08-18 RX ADMIN — Medication 3 MILLILITER(S): at 03:41

## 2017-08-18 RX ADMIN — MEROPENEM 200 MILLIGRAM(S): 1 INJECTION INTRAVENOUS at 22:21

## 2017-08-18 RX ADMIN — Medication 1: at 17:05

## 2017-08-18 RX ADMIN — MEROPENEM 200 MILLIGRAM(S): 1 INJECTION INTRAVENOUS at 06:25

## 2017-08-18 RX ADMIN — ONDANSETRON 4 MILLIGRAM(S): 8 TABLET, FILM COATED ORAL at 12:57

## 2017-08-18 RX ADMIN — LIDOCAINE 1 PATCH: 4 CREAM TOPICAL at 12:18

## 2017-08-18 RX ADMIN — BENZOCAINE AND MENTHOL 1 LOZENGE: 5; 1 LIQUID ORAL at 22:21

## 2017-08-18 RX ADMIN — RIVAROXABAN 20 MILLIGRAM(S): KIT at 12:48

## 2017-08-18 RX ADMIN — Medication 81 MILLIGRAM(S): at 12:48

## 2017-08-18 NOTE — PROGRESS NOTE ADULT - SUBJECTIVE AND OBJECTIVE BOX
INTERVAL HPI/OVERNIGHT EVENTS:    Events noted.  Surgical evaluation appreciated.  Discussed with Surgeon and with Neurologist.  Neurologist will examine patient again tomorrow.      Antimicrobial:  meropenem IVPB 500 milliGRAM(s) IV Intermittent every 8 hours    Cardiovascular:  ATENolol  Tablet 50 milliGRAM(s) Oral daily    Pulmonary:    Hematalogic:  aspirin enteric coated 81 milliGRAM(s) Oral daily  rivaroxaban 20 milliGRAM(s) Oral <User Schedule>    Other:  insulin lispro (HumaLOG) corrective regimen sliding scale   SubCutaneous three times a day before meals  dextrose 5%. 1000 milliLiter(s) IV Continuous <Continuous>  dextrose Gel 1 Dose(s) Oral once PRN  dextrose 50% Injectable 12.5 Gram(s) IV Push once  dextrose 50% Injectable 25 Gram(s) IV Push once  dextrose 50% Injectable 25 Gram(s) IV Push once  glucagon  Injectable 1 milliGRAM(s) IntraMuscular once PRN  sodium chloride 0.9%. 1000 milliLiter(s) IV Continuous <Continuous>  oxyCODONE    5 mG/acetaminophen 325 mG 1 Tablet(s) Oral every 6 hours PRN  lidocaine   Patch 1 Patch Transdermal daily  acetaminophen   Tablet 650 milliGRAM(s) Oral every 6 hours PRN  buPROPion XL . 300 milliGRAM(s) Oral daily  diphenhydrAMINE   Capsule 25 milliGRAM(s) Oral once PRN  ondansetron Injectable 4 milliGRAM(s) IV Push every 6 hours PRN    aspirin enteric coated 81 milliGRAM(s) Oral daily  ATENolol  Tablet 50 milliGRAM(s) Oral daily  insulin lispro (HumaLOG) corrective regimen sliding scale   SubCutaneous three times a day before meals  dextrose 5%. 1000 milliLiter(s) IV Continuous <Continuous>  dextrose Gel 1 Dose(s) Oral once PRN  dextrose 50% Injectable 12.5 Gram(s) IV Push once  dextrose 50% Injectable 25 Gram(s) IV Push once  dextrose 50% Injectable 25 Gram(s) IV Push once  glucagon  Injectable 1 milliGRAM(s) IntraMuscular once PRN  sodium chloride 0.9%. 1000 milliLiter(s) IV Continuous <Continuous>  oxyCODONE    5 mG/acetaminophen 325 mG 1 Tablet(s) Oral every 6 hours PRN  lidocaine   Patch 1 Patch Transdermal daily  acetaminophen   Tablet 650 milliGRAM(s) Oral every 6 hours PRN  buPROPion XL . 300 milliGRAM(s) Oral daily  meropenem IVPB 500 milliGRAM(s) IV Intermittent every 8 hours  rivaroxaban 20 milliGRAM(s) Oral <User Schedule>  diphenhydrAMINE   Capsule 25 milliGRAM(s) Oral once PRN  ondansetron Injectable 4 milliGRAM(s) IV Push every 6 hours PRN    Drug Dosing Weight  Height (cm): 182.88 (12 Aug 2017 10:02)  Weight (kg): 83.9 (12 Aug 2017 18:00)  BMI (kg/m2): 25.1 (12 Aug 2017 18:00)  BSA (m2): 2.06 (12 Aug 2017 18:00)        SCALES: [ ] YES [ ] NO    DATE INSERTED:  REMOVE:  [ ] YES [ ] NO  EXPLAIN:      PAST MEDICAL & SURGICAL HISTORY:  Diabetes  HTN (hypertension)      REVIEW OF SYSTEMS:    CONSTITUTIONAL: No fever, weight loss, or fatigue  EYES: No eye pain, visual disturbances, or discharge  ENMT:  No difficulty hearing, tinnitus, vertigo; No sinus or throat pain  NECK: No pain or stiffness  BREASTS: No pain, masses, or nipple discharge  RESPIRATORY: No cough, wheezing, chills or hemoptysis; No shortness of breath  CARDIOVASCULAR: No chest pain, palpitations, dizziness, or leg swelling  GASTROINTESTINAL: No abdominal or epigastric pain. No nausea, vomiting, or hematemesis; No diarrhea or constipation. No melena or hematochezia.  GENITOURINARY: No dysuria, frequency, hematuria, or incontinence  NEUROLOGICAL: No headaches, memory loss, loss of strength, numbness, or tremors  SKIN: No itching, burning, rashes, or lesions   LYMPH NODES: No enlarged glands  ENDOCRINE: No heat or cold intolerance; No hair loss  MUSCULOSKELETAL: No joint pain or swelling; No muscle, back, or extremity pain  PSYCHIATRIC: No depression, anxiety, mood swings, or difficulty sleeping  HEME/LYMPH: No easy bruising, or bleeding gums  ALLERGY AND IMMUNOLOGIC: No hives or eczema      T(C): 36.6 (08-18-17 @ 17:00), Max: 36.9 (08-17-17 @ 23:32)  HR: 65 (08-18-17 @ 17:00) (62 - 74)  BP: 144/87 (08-18-17 @ 17:00) (125/88 - 192/97)  RR: 18 (08-18-17 @ 17:00) (17 - 18)  SpO2: 95% (08-18-17 @ 17:00) (93% - 98%)  Wt(kg): --        I&O's Detail    17 Aug 2017 07:01  -  18 Aug 2017 07:00  --------------------------------------------------------  IN:    sodium chloride 0.9%.: 1200 mL  Total IN: 1200 mL    OUT:    Nasoenteral Tube: 1000 mL    Voided: 700 mL  Total OUT: 1700 mL    Total NET: -500 mL      18 Aug 2017 07:01  -  18 Aug 2017 19:33  --------------------------------------------------------  IN:    sodium chloride 0.9%.: 1200 mL    Solution: 100 mL  Total IN: 1300 mL    OUT:    Nasoenteral Tube: 600 mL  Total OUT: 600 mL    Total NET: 700 mL            PHYSICAL EXAM:    GENERAL: NAD, well-groomed, well-developed  HEAD:  Atraumatic, Normocephalic  EYES: EOMI, PERRLA, conjunctiva and sclera clear  ENMT: No tonsillar erythema, exudates, or enlargement; Moist mucous membranes, Good dentition, No lesions  NECK: Supple, No JVD, Normal thyroid  NERVOUS SYSTEM:  Alert & Oriented X3, Good concentration; Motor Strength 5/5 B/L upper and lower extremities; DTRs 2+ intact and symmetric  CHEST/LUNG: Clear to percussion bilaterally; No rales, rhonchi, wheezing, or rubs  HEART: Regular rate and rhythm; No murmurs, rubs, or gallops  ABDOMEN: Soft, Nontender, Nondistended; Bowel sounds present  EXTREMITIES:  2+ Peripheral Pulses, No clubbing, cyanosis, or edema  LYMPH: No lymphadenopathy noted  SKIN: No rashes or lesions      LABS:  CBC Full  -  ( 18 Aug 2017 04:18 )  WBC Count : 14.3 K/uL  Hemoglobin : 15.8 g/dL  Hematocrit : 45.1 %  Platelet Count - Automated : 432 K/uL  Mean Cell Volume : 87.9 fl  Mean Cell Hemoglobin : 30.8 pg  Mean Cell Hemoglobin Concentration : 35.1 gm/dL  Auto Neutrophil # : x  Auto Lymphocyte # : x  Auto Monocyte # : x  Auto Eosinophil # : x  Auto Basophil # : x  Auto Neutrophil % : x  Auto Lymphocyte % : x  Auto Monocyte % : x  Auto Eosinophil % : x  Auto Basophil % : x    08-18    144  |  108  |  31<H>  ----------------------------<  182<H>  3.6   |  24  |  0.81    Ca    8.1<L>      18 Aug 2017 04:03    TPro  6.7  /  Alb  2.5<L>  /  TBili  0.6  /  DBili  x   /  AST  37  /  ALT  56  /  AlkPhos  54  08-18    PTT - ( 17 Aug 2017 08:02 )  PTT:30.4 sec        RADIOLOGY & ADDITIONAL STUDIES:

## 2017-08-18 NOTE — DIETITIAN INITIAL EVALUATION ADULT. - ENERGY NEEDS
Height (cm): 182.88 (08-12)  Weight (kg): 81.9 (08-16)  BMI (kg/m2): 24.5 (08-16)  Ideal Body Weight: 81 kg+/-10%  % Ideal Body Weight: 100%

## 2017-08-18 NOTE — PROGRESS NOTE ADULT - SUBJECTIVE AND OBJECTIVE BOX
Patient is a 66y old  Male who presents with a chief complaint of fever, abdominal pain (12 Aug 2017 15:51)        PAST MEDICAL & SURGICAL HISTORY:  Diabetes  HTN (hypertension)      Summary of admission HPI: NSTEMI AFib CVA                PREVIOUS DIAGNOSTIC TESTING:      ECHO  FINDINGS:    STRESS  FINDINGS:    CATHETERIZATION  FINDINGS:    ELECTROPHYSIOLOGY STUDY  FINDINGS:    CAROTID ULTRASOUND:  FINDINGS    VENOUS DUPLEX SCAN:  FINDINGS:    CHEST CT PULMONARY ANGIO with IV Contrast:  FINDINGS:  MEDICATIONS  (STANDING):  aspirin enteric coated 81 milliGRAM(s) Oral daily  ATENolol  Tablet 50 milliGRAM(s) Oral daily  insulin lispro (HumaLOG) corrective regimen sliding scale   SubCutaneous three times a day before meals  dextrose 5%. 1000 milliLiter(s) (50 mL/Hr) IV Continuous <Continuous>  dextrose 50% Injectable 12.5 Gram(s) IV Push once  dextrose 50% Injectable 25 Gram(s) IV Push once  dextrose 50% Injectable 25 Gram(s) IV Push once  sodium chloride 0.9%. 1000 milliLiter(s) (100 mL/Hr) IV Continuous <Continuous>  lidocaine   Patch 1 Patch Transdermal daily  buPROPion XL . 300 milliGRAM(s) Oral daily  meropenem IVPB 500 milliGRAM(s) IV Intermittent every 8 hours  rivaroxaban 20 milliGRAM(s) Oral <User Schedule>    MEDICATIONS  (PRN):  dextrose Gel 1 Dose(s) Oral once PRN Blood Glucose LESS THAN 70 milliGRAM(s)/deciliter  glucagon  Injectable 1 milliGRAM(s) IntraMuscular once PRN Glucose LESS THAN 70 milligrams/deciliter  oxyCODONE    5 mG/acetaminophen 325 mG 1 Tablet(s) Oral every 6 hours PRN Moderate Pain (4 - 6)  acetaminophen   Tablet 650 milliGRAM(s) Oral every 6 hours PRN For Temp greater than 38 C (100.4 F)  diphenhydrAMINE   Capsule 25 milliGRAM(s) Oral once PRN Insomnia  ondansetron Injectable 4 milliGRAM(s) IV Push every 6 hours PRN Nausea and/or Vomiting  benzocaine 15 mG/menthol 3.6 mG Lozenge 1 Lozenge Oral every 6 hours PRN Sore Throat      FAMILY HISTORY:      SOCIAL HISTORY:    CIGARETTES:    ALCOHOL:    REVIEW OF SYSTEMS:    CONSTITUTIONAL: No fever, weight loss, chills, shakes, or fatigue  EYES: No eye pain, visual disturbances, or discharge  ENMT:  No difficulty hearing, tinnitus, vertigo; No sinus or throat pain  NECK: No pain or stiffness  BREASTS: No pain, masses, or nipple discharge  RESPIRATORY: No cough, wheezing, hemoptysis, or shortness of breath  CARDIOVASCULAR: No chest pain, dyspnea, palpitations, dizziness, syncope, paroxysmal nocturnal dyspnea, orthopnea, or arm or leg swelling  GASTROINTESTINAL: No abdominal  or epigastric pain, nausea, vomiting, hematemesis, diarrhea, constipation, melena or bright red blood.  GENITOURINARY: No dysuria, nocturia, hematuria, or urinary incontinence  NEUROLOGICAL: No headaches, memory loss, slurred speech, limb weakness, loss of strength, numbness, or tremors  SKIN: No itching, burning, rashes, or lesions   LYMPH NODES: No enlarged glands  ENDOCRINE: No heat or cold intolerance, or hair loss  MUSCULOSKELETAL: No joint pain or swelling, muscle, back, or extremity pain  PSYCHIATRIC: No depression, anxiety, or difficulty sleeping  HEME/LYMPH: No easy bruising or bleeding gums  ALLERY AND IMMUNOLOGIC: No hives or rash.      Vital Signs Last 24 Hrs  T(C): 36.1 (18 Aug 2017 21:00), Max: 36.9 (17 Aug 2017 23:32)  T(F): 97 (18 Aug 2017 21:00), Max: 98.4 (17 Aug 2017 23:32)  HR: 79 (18 Aug 2017 21:00) (62 - 79)  BP: 159/94 (18 Aug 2017 21:00) (125/88 - 192/97)  BP(mean): --  RR: 18 (18 Aug 2017 21:00) (17 - 18)  SpO2: 93% (18 Aug 2017 21:00) (93% - 98%)    PHYSICAL EXAM:    GENERAL: In no apparent distress, well nourished, and hydrated.  HEAD:  Atraumatic, Normocephalic  EYES: EOMI, PERRLA, conjunctiva and sclera clear  ENMT: No tonsillar erythema, exudates, or enlargement; Moist mucous membranes, Good dentition, No lesions  NECK: Supple and normal thyroid.  No JVD or carotid bruit.  Carotid pulse is 2+ bilaterally.  HEART: Regular rate and rhythm; No murmurs, rubs, or gallops.  PULMONARY: Clear to auscultation and perfusion.  No rales, wheezing, or rhonchi bilaterally.  ABDOMEN: Soft, Nontender, Nondistended; Bowel sounds present  EXTREMITIES:  2+ Peripheral Pulses, No clubbing, cyanosis, or edema  LYMPH: No lymphadenopathy noted  NEUROLOGICAL: Grossly nonfocal          INTERPRETATION OF TELEMETRY:    ECG:    I&O's Detail    17 Aug 2017 07:  -  18 Aug 2017 07:00  --------------------------------------------------------  IN:    sodium chloride 0.9%.: 1200 mL  Total IN: 1200 mL    OUT:    Nasoenteral Tube: 1000 mL    Voided: 700 mL  Total OUT: 1700 mL    Total NET: -500 mL      18 Aug 2017 07:  -  18 Aug 2017 22:23  --------------------------------------------------------  IN:    sodium chloride 0.9%.: 1200 mL    Solution: 100 mL  Total IN: 1300 mL    OUT:    Nasoenteral Tube: 600 mL  Total OUT: 600 mL    Total NET: 700 mL          LABS:                        15.8   14.3  )-----------( 432      ( 18 Aug 2017 04:18 )             45.1     08-18    144  |  108  |  31<H>  ----------------------------<  182<H>  3.6   |  24  |  0.81    Ca    8.1<L>      18 Aug 2017 04:03    TPro  6.7  /  Alb  2.5<L>  /  TBili  0.6  /  DBili  x   /  AST  37  /  ALT  56  /  AlkPhos  54  08-18    CARDIAC MARKERS ( 18 Aug 2017 04:03 )  .022 ng/mL / x     / 74 U/L / x     / 2.1 ng/mL      PTT - ( 17 Aug 2017 08:02 )  PTT:30.4 sec    BNP  I&O's Detail    17 Aug 2017 07:  -  18 Aug 2017 07:00  --------------------------------------------------------  IN:    sodium chloride 0.9%.: 1200 mL  Total IN: 1200 mL    OUT:    Nasoenteral Tube: 1000 mL    Voided: 700 mL  Total OUT: 1700 mL    Total NET: -500 mL      18 Aug 2017 07:01  -  18 Aug 2017 22:23  --------------------------------------------------------  IN:    sodium chloride 0.9%.: 1200 mL    Solution: 100 mL  Total IN: 1300 mL    OUT:    Nasoenteral Tube: 600 mL  Total OUT: 600 mL    Total NET: 700 mL        Daily     Daily Weight in k.9 (18 Aug 2017 11:01)    RADIOLOGY & ADDITIONAL STUDIES:

## 2017-08-18 NOTE — CHART NOTE - NSCHARTNOTEFT_GEN_A_CORE
Hospitalist Medicine PA note    Patient seen and examined at bedside s/p NGT placement overnight secondary to episodes of vomiting x3 and abdominal pain. Patient states he is feeling much better after the NGT was placed, and feels less distended. Patient denies hx of abdominal surgeries. Last BM was 4 days ago. Admits to +flatus. Denies current nausea/vomiting, patient NPO. Denies cp, sob, dizziness, dysuria.     VS: Afebrile   148/85   69     93%    PE:  General: NAD.  HEENT: NGT in place with billous output (350cc seen in canister, 1L was emptied at 7am per RN)  CV: +S1S2  Lungs: rhonchi noted.  Abdomen: Mildly distended, +Quiet BS, soft, mild tenderness to LLQ, no guarding, no rebound    A/P: 67yo Male with PMHx of HTN and DM, afib on Xarelto a/w Sepsis due to spiking fevers, now with biliary emesis and abdominal pain s/p NGT placement for decompression. KUB reveals Diffusely dilated SB loops, concerning for obstruction.    -CT scan Abdomen/pelvis   -Continue NPO, NGT-- still with moderate output  -ABX per ID  -IV hydration, Antiemetic PRN  -Continue to monitor and observe patient  -continue medical management/supportive care  -Discussed with Dr. Raymond Hospitalist Medicine PA note    Patient seen and examined at bedside s/p NGT placement overnight secondary to episodes of vomiting x3 and abdominal pain. Patient states he is feeling much better after the NGT was placed, and feels less distended. Patient denies hx of abdominal surgeries. Last BM was 4 days ago. Admits to +flatus. Denies current nausea/vomiting, patient NPO. Denies cp, sob, dizziness, dysuria.     VS: Afebrile   148/85   69     93%    PE:  General: NAD.  HEENT: NGT in place with billous output (350cc seen in canister, 1L was emptied at 7am per RN)  CV: +S1S2  Lungs: rhonchi noted.  Abdomen: Mildly distended, +Quiet BS, soft, mild tenderness to LLQ, no guarding, no rebound    A/P: 67yo Male with PMHx of HTN and DM, afib on Xarelto a/w Sepsis due to spiking fevers, now with biliary emesis and abdominal pain s/p NGT placement for decompression. KUB reveals Diffusely dilated SB loops, concerning for obstruction.    -CT scan Abdomen/pelvis --pending results, consider surgical consult  -Continue NPO, NGT-- still with moderate output  -ABX per ID  -IV hydration, Antiemetic PRN  -Continue to monitor and observe patient  -continue medical management/supportive care  -Discussed with Dr. Raymond Hospitalist Medicine PA note    Patient seen and examined at bedside s/p NGT placement overnight secondary to episodes of vomiting x3 and abdominal pain. Patient states he is feeling much better after the NGT was placed, and feels less distended. Patient denies hx of abdominal surgeries. Last BM was 4 days ago. Admits to +flatus. Denies current nausea/vomiting, patient NPO. Denies cp, sob, dizziness, dysuria.     VS: Afebrile   148/85   69     93%    PE:  General: NAD.  HEENT: NGT in place with billous output (350cc seen in canister, 1L was emptied at 7am per RN)  CV: +S1S2  Lungs: rhonchi noted.  Abdomen: Mildly distended, +Quiet BS, soft, mild tenderness to LLQ, no guarding, no rebound    A/P: 67yo Male with PMHx of HTN and DM, afib on Xarelto a/w Sepsis due to spiking fevers, now with biliary emesis and abdominal pain s/p NGT placement for decompression. KUB reveals Diffusely dilated SB loops, concerning for obstruction.    -CT scan Abdomen/pelvis  -Continue NPO, NGT-- still with moderate output  -Surgery consulted-- Dr. Hernández called to see patient  -ABX per ID  -IV hydration, Antiemetic PRN  -Continue to monitor and observe patient  -continue medical management/supportive care  -Discussed with Dr. Raymond

## 2017-08-18 NOTE — CONSULT NOTE ADULT - PROBLEM SELECTOR PROBLEM 5
Cerebrovascular accident (CVA), unspecified mechanism
Type 2 diabetes mellitus without complication, without long-term current use of insulin

## 2017-08-18 NOTE — CHART NOTE - NSCHARTNOTEFT_GEN_A_CORE
Upon Nutritional Assessment by the Registered Dietitian your patient was determined to meet criteria / has evidence of the following diagnosis/diagnoses:          [ ]  Mild Protein Calorie Malnutrition        [ ]  Moderate Protein Calorie Malnutrition        [x ] Severe Protein Calorie Malnutrition        [ ] Unspecified Protein Calorie Malnutrition        [ ] Underweight / BMI <19        [ ] Morbid Obesity / BMI > 40      Findings as based on:  •  Comprehensive nutrition assessment and consultation  •  Calorie counts (nutrient intake analysis)  •  Food acceptance and intake status from observations by staff  •  Follow up  •  Patient education  •  Intervention secondary to interdisciplinary rounds  •   concerns      Treatment:    The following diet has been recommended:  when medically feasible, advance to consistent CHO diet c evening snack; Glucerna shake x3/day (660 kcals, 30g pro)    PROVIDER Section:     By signing this assessment you are acknowledging and agree with the diagnosis/diagnoses assigned by the Registered Dietitian    Comments:

## 2017-08-18 NOTE — DIETITIAN INITIAL EVALUATION ADULT. - OTHER INFO
pt seen due to LOS. pt presents c decreased appetite x 2 wks due to abd pain. 3 episodes of biliary emesis overnight, + nausea, diet NPO now. pt lives c wife who says he hasn't really eaten much ("just nibbles") x2 weeks; 1 wk in the hosp and 1 week PTA due to abd. pain.

## 2017-08-18 NOTE — CONSULT NOTE ADULT - SUBJECTIVE AND OBJECTIVE BOX
HPI:  65 y/o M w hx of DM, htn, presents w fever and abd pain x 1 week; reports the pain is in bilateral lower quadrant; denies vomiting; denies diarrhea; denies urinary symptoms; + cough; denies CP/SOB; denies lower ext swelling; no headache or neck stiffness (12 Aug 2017 15:51)    Now with vomiting and distension.    PSH: None    PAST MEDICAL & SURGICAL HISTORY:  Diabetes  HTN (hypertension)      MEDICATIONS  (STANDING):  aspirin enteric coated 81 milliGRAM(s) Oral daily  ATENolol  Tablet 50 milliGRAM(s) Oral daily  insulin lispro (HumaLOG) corrective regimen sliding scale   SubCutaneous three times a day before meals  dextrose 5%. 1000 milliLiter(s) (50 mL/Hr) IV Continuous <Continuous>  dextrose 50% Injectable 12.5 Gram(s) IV Push once  dextrose 50% Injectable 25 Gram(s) IV Push once  dextrose 50% Injectable 25 Gram(s) IV Push once  sodium chloride 0.9%. 1000 milliLiter(s) (100 mL/Hr) IV Continuous <Continuous>  lidocaine   Patch 1 Patch Transdermal daily  buPROPion XL . 300 milliGRAM(s) Oral daily  meropenem IVPB 500 milliGRAM(s) IV Intermittent every 8 hours  rivaroxaban 20 milliGRAM(s) Oral <User Schedule>    MEDICATIONS  (PRN):  dextrose Gel 1 Dose(s) Oral once PRN Blood Glucose LESS THAN 70 milliGRAM(s)/deciliter  glucagon  Injectable 1 milliGRAM(s) IntraMuscular once PRN Glucose LESS THAN 70 milligrams/deciliter  oxyCODONE    5 mG/acetaminophen 325 mG 1 Tablet(s) Oral every 6 hours PRN Moderate Pain (4 - 6)  acetaminophen   Tablet 650 milliGRAM(s) Oral every 6 hours PRN For Temp greater than 38 C (100.4 F)  diphenhydrAMINE   Capsule 25 milliGRAM(s) Oral once PRN Insomnia  ondansetron Injectable 4 milliGRAM(s) IV Push every 6 hours PRN Nausea and/or Vomiting      Allergies    No Known Allergies    Intolerances        SOCIAL HISTORY:  No drug use    FAMILY HISTORY:      REVIEW OF SYSTEMS:  CONSTITUTIONAL: In no acute distress.  EYES: No eye pain, visual disturbances, or discharge  ENMT:  No difficulty hearing, tinnitus, vertigo; No sinus or throat pain  NECK: No pain or stiffness  BREASTS: No pain, masses, or nipple discharge  RESPIRATORY: No cough, wheezing, chills or hemoptysis; No shortness of breath  CARDIOVASCULAR: No chest pain, palpitations, dizziness, or leg swelling  GENITOURINARY: No dysuria, frequency, hematuria, or incontinence  NEUROLOGICAL: No headaches, memory loss, loss of strength, numbness, or tremors  SKIN: No itching, burning, rashes, or lesions   LYMPH NODES: No enlarged glands  ENDOCRINE: No heat or cold intolerance; No hair loss  MUSCULOSKELETAL: No joint pain or swelling; No muscle, back, or extremity pain  PSYCHIATRIC: No depression, anxiety, mood swings, or difficulty sleeping  HEME/LYMPH: No easy bruising, or bleeding gums  ALLERGY AND IMMUNOLOGIC: No hives or eczema      Vital Signs Last 24 Hrs  T(C): 36.7 (18 Aug 2017 10:50), Max: 36.9 (17 Aug 2017 23:32)  T(F): 98 (18 Aug 2017 10:50), Max: 98.4 (17 Aug 2017 23:32)  HR: 71 (18 Aug 2017 15:30) (62 - 74)  BP: 125/88 (18 Aug 2017 15:30) (125/88 - 192/97)  BP(mean): --  RR: 18 (18 Aug 2017 10:50) (17 - 18)  SpO2: 96% (18 Aug 2017 15:30) (93% - 98%)    Daily     Daily Weight in k.9 (18 Aug 2017 11:01)    PHYSICAL EXAM:  GENERAL: NAD, well-groomed, well-developed  HEAD:  Atraumatic, Normocephalic  EYES: EOMI, PERRL, conjunctiva and sclera clear  ENMT: Moist mucous membranes, Good dentition, No lesions  NECK: Supple, No JVD  NERVOUS SYSTEM:  Alert & Oriented X3, Good concentration  CHEST/LUNG: Clear to percussion bilaterally; Normal respiratory effort  HEART: Regular rate and rhythm  ABDOMEN: Soft, Nontender, Distended; Bowel sounds hypoactive  : normal external genitalia  BREASTS: no breast lumps  EXTREMITIES:  No clubbing, cyanosis, or edema  VASC: equal peripheral pulses  LYMPH: No lymphadenopathy noted  SKIN: No rashes or lesions  PSYCH: normal affect    LABS:                        15.8   14.3  )-----------( 432      ( 18 Aug 2017 04:18 )             45.1     Neutrophil %:   08-18    144  |  108  |  31<H>  ----------------------------<  182<H>  3.6   |  24  |  0.81    Ca    8.1<L>      18 Aug 2017 04:03    TPro  6.7  /  Alb  2.5<L>  /  TBili  0.6  /  DBili  x   /  AST  37  /  ALT  56  /  AlkPhos  54      PTT - ( 17 Aug 2017 08:02 )  PTT:30.4 sec      RADIOLOGY & ADDITIONAL STUDIES:  CT Abdomen and Pelvis w/ Oral Cont and w/ IV Cont (17 @ 13:55) >  BOWEL: There are multiple dilated proximal and mid small bowel loops with   a caliber change in the central abdomen (series 2, image 77). There is   mild wall thickening of the loop of bowel just distal to the transition   point (series 601B, images 63 and 66). There is colonic diverticulosis   without diverticulitis. Appendix is normal. There is an NG tube with the   tip in the stomach.      IMPRESSION:     Mid to distal small bowel bowel obstruction with the transition point in   the central abdomen. There is mild wall thickening of the bowel loop just   distal to the transition point which may be related to inflammatory,   ischemic or infectious etiologies. HPI:  65 y/o M w hx of DM, htn, presents w fever and abd pain x 1 week; reports the pain is in bilateral lower quadrant; denies vomiting; denies diarrhea; denies urinary symptoms; + cough; denies CP/SOB; denies lower ext swelling; no headache or neck stiffness (12 Aug 2017 15:51)    Now with vomiting and distension. Pt is passing flatus, and had BM few days ago    PSH: None    PAST MEDICAL & SURGICAL HISTORY:  Diabetes  HTN (hypertension)      MEDICATIONS  (STANDING):  aspirin enteric coated 81 milliGRAM(s) Oral daily  ATENolol  Tablet 50 milliGRAM(s) Oral daily  insulin lispro (HumaLOG) corrective regimen sliding scale   SubCutaneous three times a day before meals  dextrose 5%. 1000 milliLiter(s) (50 mL/Hr) IV Continuous <Continuous>  dextrose 50% Injectable 12.5 Gram(s) IV Push once  dextrose 50% Injectable 25 Gram(s) IV Push once  dextrose 50% Injectable 25 Gram(s) IV Push once  sodium chloride 0.9%. 1000 milliLiter(s) (100 mL/Hr) IV Continuous <Continuous>  lidocaine   Patch 1 Patch Transdermal daily  buPROPion XL . 300 milliGRAM(s) Oral daily  meropenem IVPB 500 milliGRAM(s) IV Intermittent every 8 hours  rivaroxaban 20 milliGRAM(s) Oral <User Schedule>    MEDICATIONS  (PRN):  dextrose Gel 1 Dose(s) Oral once PRN Blood Glucose LESS THAN 70 milliGRAM(s)/deciliter  glucagon  Injectable 1 milliGRAM(s) IntraMuscular once PRN Glucose LESS THAN 70 milligrams/deciliter  oxyCODONE    5 mG/acetaminophen 325 mG 1 Tablet(s) Oral every 6 hours PRN Moderate Pain (4 - 6)  acetaminophen   Tablet 650 milliGRAM(s) Oral every 6 hours PRN For Temp greater than 38 C (100.4 F)  diphenhydrAMINE   Capsule 25 milliGRAM(s) Oral once PRN Insomnia  ondansetron Injectable 4 milliGRAM(s) IV Push every 6 hours PRN Nausea and/or Vomiting      Allergies    No Known Allergies    Intolerances        SOCIAL HISTORY:  No drug use    FAMILY HISTORY:      REVIEW OF SYSTEMS:  CONSTITUTIONAL: In no acute distress.  EYES: No eye pain, visual disturbances, or discharge  ENMT:  No difficulty hearing, tinnitus, vertigo; No sinus or throat pain  NECK: No pain or stiffness  BREASTS: No pain, masses, or nipple discharge  RESPIRATORY: No cough, wheezing, chills or hemoptysis; No shortness of breath  CARDIOVASCULAR: No chest pain, palpitations, dizziness, or leg swelling  GENITOURINARY: No dysuria, frequency, hematuria, or incontinence  NEUROLOGICAL: No headaches, memory loss, loss of strength, numbness, or tremors  SKIN: No itching, burning, rashes, or lesions   LYMPH NODES: No enlarged glands  ENDOCRINE: No heat or cold intolerance; No hair loss  MUSCULOSKELETAL: No joint pain or swelling; No muscle, back, or extremity pain  PSYCHIATRIC: No depression, anxiety, mood swings, or difficulty sleeping  HEME/LYMPH: No easy bruising, or bleeding gums  ALLERGY AND IMMUNOLOGIC: No hives or eczema      Vital Signs Last 24 Hrs  T(C): 36.7 (18 Aug 2017 10:50), Max: 36.9 (17 Aug 2017 23:32)  T(F): 98 (18 Aug 2017 10:50), Max: 98.4 (17 Aug 2017 23:32)  HR: 71 (18 Aug 2017 15:30) (62 - 74)  BP: 125/88 (18 Aug 2017 15:30) (125/88 - 192/97)  BP(mean): --  RR: 18 (18 Aug 2017 10:50) (17 - 18)  SpO2: 96% (18 Aug 2017 15:30) (93% - 98%)    Daily     Daily Weight in k.9 (18 Aug 2017 11:01)    PHYSICAL EXAM:  GENERAL: NAD, well-groomed, well-developed  HEAD:  Atraumatic, Normocephalic  EYES: EOMI, PERRL, conjunctiva and sclera clear  ENMT: Moist mucous membranes, Good dentition, No lesions  NECK: Supple, No JVD  NERVOUS SYSTEM:  Alert & Oriented X3, Good concentration  CHEST/LUNG: Clear to percussion bilaterally; Normal respiratory effort  HEART: Regular rate and rhythm  ABDOMEN: Soft, Nontender, Distended; Bowel sounds hypoactive  : normal external genitalia  BREASTS: no breast lumps  EXTREMITIES:  No clubbing, cyanosis, or edema  VASC: equal peripheral pulses  LYMPH: No lymphadenopathy noted  SKIN: No rashes or lesions  PSYCH: normal affect    LABS:                        15.8   14.3  )-----------( 432      ( 18 Aug 2017 04:18 )             45.1     Neutrophil %:       144  |  108  |  31<H>  ----------------------------<  182<H>  3.6   |  24  |  0.81    Ca    8.1<L>      18 Aug 2017 04:03    TPro  6.7  /  Alb  2.5<L>  /  TBili  0.6  /  DBili  x   /  AST  37  /  ALT  56  /  AlkPhos  54      PTT - ( 17 Aug 2017 08:02 )  PTT:30.4 sec      RADIOLOGY & ADDITIONAL STUDIES:  CT Abdomen and Pelvis w/ Oral Cont and w/ IV Cont (17 @ 13:55) >  BOWEL: There are multiple dilated proximal and mid small bowel loops with   a caliber change in the central abdomen (series 2, image 77). There is   mild wall thickening of the loop of bowel just distal to the transition   point (series 601B, images 63 and 66). There is colonic diverticulosis   without diverticulitis. Appendix is normal. There is an NG tube with the   tip in the stomach.      IMPRESSION:     Mid to distal small bowel bowel obstruction with the transition point in   the central abdomen. There is mild wall thickening of the bowel loop just   distal to the transition point which may be related to inflammatory,   ischemic or infectious etiologies.

## 2017-08-18 NOTE — CONSULT NOTE ADULT - PROBLEM SELECTOR RECOMMENDATION 2
NGT decompression  IV Fluid  Repeat Labs with Lactate.  Non-operative Surgical care for now  Surgical further recommendation will be based on clinical course.
anticoagulation.

## 2017-08-18 NOTE — DIETITIAN INITIAL EVALUATION ADULT. - NS AS NUTRI INTERV MEALS SNACK
when medically feasible, advance back to [facility] diet c supplement/Carbohydrate - modified diet

## 2017-08-18 NOTE — CONSULT NOTE ADULT - PROBLEM SELECTOR PROBLEM 2
Small bowel obstruction, partial
Atrial fibrillation, unspecified type
NSTEMI (non-ST elevated myocardial infarction)

## 2017-08-18 NOTE — DIETITIAN INITIAL EVALUATION ADULT. - PERTINENT MEDS FT
xarelto, ecotrin, merrem, d5@50mL/hr, NaCl@100mL/hr, omnipaque, zofran, benadryl, wellbutrin, tylenol, lidoderm, percocet, tenormin, humalog

## 2017-08-18 NOTE — CONSULT NOTE ADULT - PROBLEM SELECTOR RECOMMENDATION 9
NPO/Bowel Rest
1. temp- tylenol 650, q 6 hrs prn  2. sepsis- continue abx coverage, f/u ID recommendations, IVF replacement as needed, f/u cultures, repeat labs, replace electrolytes as needed  3. agitation- continue management of infection, asked wife to bring in meds to reassess, xanax x 1- pt showed improvement   4. f/u neuro recommendations   5. Afib- continue heparin drip, unofficial bedside US shows no evidence of right heart strain, f/u official echo results   6. Pt presently not a candidate for ICU. Please call if pt condition worsens or if there are any further concerns.
risk stratification

## 2017-08-18 NOTE — DIETITIAN INITIAL EVALUATION ADULT. - PERTINENT LABORATORY DATA
08-18 Na144 mmol/L Glu 182 mg/dL<H> K+ 3.6 mmol/L Cr  0.81 mg/dL BUN 31 mg/dL<H> Phos n/a   Alb 2.5 g/dL<L> PAB n/a

## 2017-08-18 NOTE — PROGRESS NOTE ADULT - SUBJECTIVE AND OBJECTIVE BOX
67 y/o M w hx of DM, htn, presents w fever and abd pain x 1 week; reports the pain is in bilateral lower quadrant; denies vomiting; denies diarrhea; denies urinary symptoms; + cough; denies CP/SOB; denies lower ext swelling; no headache or neck stiffness (12 Aug 2017 15:51)  here last high tempt on 8/13; then 100.8 on 8/14  now afebrile x 4 days   main issues is left weakness  i discussed with dr hernandez again yesterday   awaiting follow up   discussed with pa this am  No Known Allergies    Intolerances        MEDICATIONS  (STANDING):  aspirin enteric coated 81 milliGRAM(s) Oral daily  ATENolol  Tablet 50 milliGRAM(s) Oral daily  insulin lispro (HumaLOG) corrective regimen sliding scale   SubCutaneous three times a day before meals  dextrose 5%. 1000 milliLiter(s) (50 mL/Hr) IV Continuous <Continuous>  dextrose 50% Injectable 12.5 Gram(s) IV Push once  dextrose 50% Injectable 25 Gram(s) IV Push once  dextrose 50% Injectable 25 Gram(s) IV Push once  sodium chloride 0.9%. 1000 milliLiter(s) (100 mL/Hr) IV Continuous <Continuous>  lidocaine   Patch 1 Patch Transdermal daily  buPROPion XL . 300 milliGRAM(s) Oral daily  meropenem IVPB 500 milliGRAM(s) IV Intermittent every 8 hours  rivaroxaban 20 milliGRAM(s) Oral <User Schedule>    MEDICATIONS  (PRN):  dextrose Gel 1 Dose(s) Oral once PRN Blood Glucose LESS THAN 70 milliGRAM(s)/deciliter  glucagon  Injectable 1 milliGRAM(s) IntraMuscular once PRN Glucose LESS THAN 70 milligrams/deciliter  oxyCODONE    5 mG/acetaminophen 325 mG 1 Tablet(s) Oral every 6 hours PRN Moderate Pain (4 - 6)  acetaminophen   Tablet 650 milliGRAM(s) Oral every 6 hours PRN For Temp greater than 38 C (100.4 F)  diphenhydrAMINE   Capsule 25 milliGRAM(s) Oral once PRN Insomnia  ondansetron Injectable 4 milliGRAM(s) IV Push every 6 hours PRN Nausea and/or Vomiting      REVIEW OF SYSTEMS:  today has sbo   CONSTITUTIONAL: No fever, chills, weight loss, or fatigue  HEENT: No sore throat, runny nose, ear ache  RESPIRATORY: No cough, wheezing, No shortness of breath  CARDIOVASCULAR: No chest pain, palpitations, dizziness  GASTROINTESTINAL: distension and  abdominal pain. No nausea, vomiting, diarrhea  GENITOURINARY: No dysuria, increase frequency, hematuria, or incontinence  NEUROLOGICAL: No headaches, memory loss  left paralyses   EXTREMITY: No pedal edema BLE  SKIN: No itching, burning, rashes, or lesions     VITAL SIGNS:  T(C): 36.7 (08-18-17 @ 10:50), Max: 36.9 (08-17-17 @ 23:32)  T(F): 98 (08-18-17 @ 10:50), Max: 98.4 (08-17-17 @ 23:32)  HR: 71 (08-18-17 @ 15:30) (62 - 74)  BP: 125/88 (08-18-17 @ 15:30) (125/88 - 192/97)  RR: 18 (08-18-17 @ 10:50) (17 - 18)  SpO2: 96% (08-18-17 @ 15:30) (93% - 98%)  Wt(kg): --    PHYSICAL EXAM:    GENERAL: not in any distress  HEENT: Neck is supple, normocephalic, atraumatic   CHEST/LUNG: Clear to percussion bilaterally; No rales, rhonchi, wheezing  HEART: Regular rate and rhythm; No murmurs, rubs, or gallops  ABDOMEN: Soft, Nontender, Nondistended; Bowel sounds present, no rebound   EXTREMITIES:  2+ Peripheral Pulses, No clubbing, cyanosis, or edema  SKIN: No rashes or lesions  BACK: no pressor sore   NERVOUS SYSTEM:  Alert & Oriented X3, Good concentration  PSYCH: normal affect   marked left arm and leg weakness     LABS:                         15.8   14.3  )-----------( 432      ( 18 Aug 2017 04:18 )             45.1     08-18    144  |  108  |  31<H>  ----------------------------<  182<H>  3.6   |  24  |  0.81    Ca    8.1<L>      18 Aug 2017 04:03    TPro  6.7  /  Alb  2.5<L>  /  TBili  0.6  /  DBili  x   /  AST  37  /  ALT  56  /  AlkPhos  54  08-18    LIVER FUNCTIONS - ( 18 Aug 2017 04:03 )  Alb: 2.5 g/dL / Pro: 6.7 gm/dL / ALK PHOS: 54 U/L / ALT: 56 U/L / AST: 37 U/L / GGT: x           PTT - ( 17 Aug 2017 08:02 )  PTT:30.4 sec      CARDIAC MARKERS ( 18 Aug 2017 04:03 )  .022 ng/mL / x     / 74 U/L / x     / 2.1 ng/mL          Sedimentation Rate, Erythrocyte: 25 mm/hr (08-14 @ 08:08)                          Radiology: 65 y/o M w hx of DM, htn, presents w fever and abd pain x 1 week; reports the pain is in bilateral lower quadrant; denies vomiting; denies diarrhea; denies urinary symptoms; + cough; denies CP/SOB; denies lower ext swelling; no headache or neck stiffness (12 Aug 2017 15:51)  here last high tempt on 8/13; then 100.8 on 8/14  now afebrile x 4 days   main issues is left weakness  i discussed with dr hernandez again yesterday   awaiting follow up   discussed with pa this am  also issues with partial sbo today   discussed with gi md   No Known Allergies    Intolerances        MEDICATIONS  (STANDING):  aspirin enteric coated 81 milliGRAM(s) Oral daily  ATENolol  Tablet 50 milliGRAM(s) Oral daily  insulin lispro (HumaLOG) corrective regimen sliding scale   SubCutaneous three times a day before meals  dextrose 5%. 1000 milliLiter(s) (50 mL/Hr) IV Continuous <Continuous>  dextrose 50% Injectable 12.5 Gram(s) IV Push once  dextrose 50% Injectable 25 Gram(s) IV Push once  dextrose 50% Injectable 25 Gram(s) IV Push once  sodium chloride 0.9%. 1000 milliLiter(s) (100 mL/Hr) IV Continuous <Continuous>  lidocaine   Patch 1 Patch Transdermal daily  buPROPion XL . 300 milliGRAM(s) Oral daily  meropenem IVPB 500 milliGRAM(s) IV Intermittent every 8 hours  rivaroxaban 20 milliGRAM(s) Oral <User Schedule>    MEDICATIONS  (PRN):  dextrose Gel 1 Dose(s) Oral once PRN Blood Glucose LESS THAN 70 milliGRAM(s)/deciliter  glucagon  Injectable 1 milliGRAM(s) IntraMuscular once PRN Glucose LESS THAN 70 milligrams/deciliter  oxyCODONE    5 mG/acetaminophen 325 mG 1 Tablet(s) Oral every 6 hours PRN Moderate Pain (4 - 6)  acetaminophen   Tablet 650 milliGRAM(s) Oral every 6 hours PRN For Temp greater than 38 C (100.4 F)  diphenhydrAMINE   Capsule 25 milliGRAM(s) Oral once PRN Insomnia  ondansetron Injectable 4 milliGRAM(s) IV Push every 6 hours PRN Nausea and/or Vomiting      REVIEW OF SYSTEMS:  today has sbo   CONSTITUTIONAL: No fever, chills, weight loss, or fatigue  HEENT: No sore throat, runny nose, ear ache  RESPIRATORY: No cough, wheezing, No shortness of breath  CARDIOVASCULAR: No chest pain, palpitations, dizziness  GASTROINTESTINAL: distension and  abdominal pain. No nausea, vomiting, diarrhea  GENITOURINARY: No dysuria, increase frequency, hematuria, or incontinence  NEUROLOGICAL: No headaches, memory loss  left paralyses   EXTREMITY: No pedal edema BLE  SKIN: No itching, burning, rashes, or lesions     VITAL SIGNS:  T(C): 36.7 (08-18-17 @ 10:50), Max: 36.9 (08-17-17 @ 23:32)  T(F): 98 (08-18-17 @ 10:50), Max: 98.4 (08-17-17 @ 23:32)  HR: 71 (08-18-17 @ 15:30) (62 - 74)  BP: 125/88 (08-18-17 @ 15:30) (125/88 - 192/97)  RR: 18 (08-18-17 @ 10:50) (17 - 18)  SpO2: 96% (08-18-17 @ 15:30) (93% - 98%)  Wt(kg): --    PHYSICAL EXAM:    GENERAL: not in any distress  HEENT: Neck is supple, normocephalic, atraumatic   CHEST/LUNG: Clear to percussion bilaterally; No rales, rhonchi, wheezing  HEART: Regular rate and rhythm; No murmurs, rubs, or gallops  ABDOMEN: Soft, Nontender, Nondistended; Bowel sounds present, no rebound   EXTREMITIES:  2+ Peripheral Pulses, No clubbing, cyanosis, or edema  SKIN: No rashes or lesions  BACK: no pressor sore   NERVOUS SYSTEM:  Alert & Oriented X3, Good concentration  PSYCH: normal affect   marked left arm and leg weakness     LABS:                         15.8   14.3  )-----------( 432      ( 18 Aug 2017 04:18 )             45.1     08-18    144  |  108  |  31<H>  ----------------------------<  182<H>  3.6   |  24  |  0.81    Ca    8.1<L>      18 Aug 2017 04:03    TPro  6.7  /  Alb  2.5<L>  /  TBili  0.6  /  DBili  x   /  AST  37  /  ALT  56  /  AlkPhos  54  08-18    LIVER FUNCTIONS - ( 18 Aug 2017 04:03 )  Alb: 2.5 g/dL / Pro: 6.7 gm/dL / ALK PHOS: 54 U/L / ALT: 56 U/L / AST: 37 U/L / GGT: x           PTT - ( 17 Aug 2017 08:02 )  PTT:30.4 sec      CARDIAC MARKERS ( 18 Aug 2017 04:03 )  .022 ng/mL / x     / 74 U/L / x     / 2.1 ng/mL          Sedimentation Rate, Erythrocyte: 25 mm/hr (08-14 @ 08:08)                          Radiology:

## 2017-08-18 NOTE — CHART NOTE - NSCHARTNOTEFT_GEN_A_CORE
Medicine Hospitalist PA    Was notified by RN pt had 2 episodes of biliary vomiting. Pt was seen and examined at bedside, appears slightly lethargic, while talking pt has another episode of bilary vomiting. Continues to cough, stating he has trouble breathing. Found to be laying down while vomiting. Admits to abdominal pain located in epigastric area non radiating. Pt states he does not remember last time he passed his bowels, has been too long.  +Nausea, +Biliary Emesis. Pt states he is urinating ok without complaints, denies cp, headache, blurry vision. Pt requested wife at bedside, notified wife who is coming.     VS:  BP: 192/97  HR: 74  O2: 94  T: 97.8    General: alert and oriented x 3, mild respiratory distress  CV: S1 S2  Resp: mild rhonchi b/l  Abd: slightly distended, TTP LLQ, BS+  Ext: no LE edema, pulses intact    A/P: 66 YOM with PMHx of HTN and DM, afib on Xarelto admitted with Sepsis due to spiking fevers.   - STAT KUB: dilated loops bowel  - STAT CXR: improving from prior  - STAT Labs CBC/CMP/Lactate/CE  - STAT Amylase/Lipase  - STAT BC  - STAT Duoneb  - NGT to low wall suction for decompression  - Continue Meropenem  - Diet NPO  - Continue to monitor Medicine Hospitalist PA    Was notified by RN pt had 2 episodes of biliary vomiting. Pt was seen and examined at bedside, appears slightly lethargic, while talking pt has another episode of bilary vomiting. Continues to cough, stating he has trouble breathing. Found to be laying down while vomiting. Admits to abdominal pain located in epigastric area non radiating. Pt states he does not remember last time he passed his bowels, has been too long.  +Nausea, +Biliary Emesis. Pt states he is urinating ok without complaints, denies cp, headache, blurry vision. Pt requested wife at bedside, notified wife who is coming.     VS:  BP: 192/97  HR: 74  O2: 94  T: 97.8 rectal    General: alert and oriented x 3, mild respiratory distress  CV: S1 S2  Resp: mild rhonchi b/l  Abd: slightly distended, TTP LLQ, BS+  Ext: no LE edema, pulses intact    A/P: 66 YOM with PMHx of HTN and DM, afib on Xarelto admitted with Sepsis due to spiking fevers now with biliary emesis, afebrile.   - STAT KUB: dilated loops bowel  - STAT CXR: improving from prior  - STAT Labs CBC/CMP/Lactate/CE  - STAT Amylase/Lipase  - STAT BC  - STAT Duoneb  - NGT to low wall suction for decompression: repeat KUB for placement  - Continue Meropenem  - Diet NPO  - Continue to monitor    ADDENDUM: 04:49  Pt doing well on low wall suction. No episodes of emesis since NGT procedure. Output noted 400mL. Pt states he feels much better, resting now. Labs relatively WNL, lactate: 1.6, negative Cardiac Enzymes. Except WBC: 14.6. Continues to be elevated.   Will notify Dr. Raymond.

## 2017-08-18 NOTE — CONSULT NOTE ADULT - ASSESSMENT
Called to see 67 Y/O male w/ pmhx of anxiety, on wellbutrin at home, dm, htn, presently on heparin drip for afib, pw c/o abd pain, diarrhea, temp on admission 8/12 of 103. S/P code stroke for noted LUE weakness. RRT called bc pt appeared confused after head CT. VS stable other than temp of 103.2. Pts last tylenol was >10hrs prior to RRT. Prior to RRT lactate was 1.6, trop .319. Pt alkalotic w/ 7.52/22/76/18.
sepsis   Altered Mental Status before ; status waxing and waning per wife   switch to merrem as crosses bbb   vanco iv   needs mri brain asap   neuro follow up   will follow with you thanks
Pt with abd distention and vomiting  He is passing gas  No previous abdominal surgery  Ileus vs Partial SBO  NGT decompression.  Repeat Labs

## 2017-08-19 LAB
ANION GAP SERPL CALC-SCNC: 10 MMOL/L — SIGNIFICANT CHANGE UP (ref 5–17)
BUN SERPL-MCNC: 27 MG/DL — HIGH (ref 7–23)
CALCIUM SERPL-MCNC: 8.1 MG/DL — LOW (ref 8.5–10.1)
CHLORIDE SERPL-SCNC: 109 MMOL/L — HIGH (ref 96–108)
CO2 SERPL-SCNC: 29 MMOL/L — SIGNIFICANT CHANGE UP (ref 22–31)
CREAT SERPL-MCNC: 0.7 MG/DL — SIGNIFICANT CHANGE UP (ref 0.5–1.3)
GLUCOSE SERPL-MCNC: 161 MG/DL — HIGH (ref 70–99)
HCT VFR BLD CALC: 39.6 % — SIGNIFICANT CHANGE UP (ref 39–50)
HGB BLD-MCNC: 13.6 G/DL — SIGNIFICANT CHANGE UP (ref 13–17)
LACTATE SERPL-SCNC: 1.2 MMOL/L — SIGNIFICANT CHANGE UP (ref 0.7–2)
MAGNESIUM SERPL-MCNC: 2.9 MG/DL — HIGH (ref 1.6–2.6)
MCHC RBC-ENTMCNC: 30.5 PG — SIGNIFICANT CHANGE UP (ref 27–34)
MCHC RBC-ENTMCNC: 34.4 GM/DL — SIGNIFICANT CHANGE UP (ref 32–36)
MCV RBC AUTO: 88.7 FL — SIGNIFICANT CHANGE UP (ref 80–100)
PHOSPHATE SERPL-MCNC: 2 MG/DL — LOW (ref 2.5–4.5)
PLATELET # BLD AUTO: 386 K/UL — SIGNIFICANT CHANGE UP (ref 150–400)
POTASSIUM SERPL-MCNC: 3.5 MMOL/L — SIGNIFICANT CHANGE UP (ref 3.5–5.3)
POTASSIUM SERPL-SCNC: 3.5 MMOL/L — SIGNIFICANT CHANGE UP (ref 3.5–5.3)
RBC # BLD: 4.47 M/UL — SIGNIFICANT CHANGE UP (ref 4.2–5.8)
RBC # FLD: 12 % — SIGNIFICANT CHANGE UP (ref 11–15)
SODIUM SERPL-SCNC: 148 MMOL/L — HIGH (ref 135–145)
WBC # BLD: 10.1 K/UL — SIGNIFICANT CHANGE UP (ref 3.8–10.5)
WBC # FLD AUTO: 10.1 K/UL — SIGNIFICANT CHANGE UP (ref 3.8–10.5)

## 2017-08-19 PROCEDURE — 74020: CPT | Mod: 26

## 2017-08-19 RX ORDER — POTASSIUM PHOSPHATE, MONOBASIC POTASSIUM PHOSPHATE, DIBASIC 236; 224 MG/ML; MG/ML
15 INJECTION, SOLUTION INTRAVENOUS ONCE
Qty: 0 | Refills: 0 | Status: COMPLETED | OUTPATIENT
Start: 2017-08-19 | End: 2017-08-19

## 2017-08-19 RX ORDER — SODIUM CHLORIDE 9 MG/ML
1000 INJECTION, SOLUTION INTRAVENOUS
Qty: 0 | Refills: 0 | Status: DISCONTINUED | OUTPATIENT
Start: 2017-08-19 | End: 2017-08-19

## 2017-08-19 RX ORDER — DEXTROSE MONOHYDRATE, SODIUM CHLORIDE, AND POTASSIUM CHLORIDE 50; .745; 4.5 G/1000ML; G/1000ML; G/1000ML
1000 INJECTION, SOLUTION INTRAVENOUS
Qty: 0 | Refills: 0 | Status: DISCONTINUED | OUTPATIENT
Start: 2017-08-19 | End: 2017-08-22

## 2017-08-19 RX ORDER — PANTOPRAZOLE SODIUM 20 MG/1
40 TABLET, DELAYED RELEASE ORAL DAILY
Qty: 0 | Refills: 0 | Status: DISCONTINUED | OUTPATIENT
Start: 2017-08-19 | End: 2017-08-21

## 2017-08-19 RX ADMIN — PANTOPRAZOLE SODIUM 40 MILLIGRAM(S): 20 TABLET, DELAYED RELEASE ORAL at 16:07

## 2017-08-19 RX ADMIN — Medication 2: at 13:07

## 2017-08-19 RX ADMIN — Medication 81 MILLIGRAM(S): at 13:06

## 2017-08-19 RX ADMIN — Medication 1: at 17:03

## 2017-08-19 RX ADMIN — RIVAROXABAN 20 MILLIGRAM(S): KIT at 13:06

## 2017-08-19 RX ADMIN — SODIUM CHLORIDE 100 MILLILITER(S): 9 INJECTION, SOLUTION INTRAVENOUS at 10:56

## 2017-08-19 RX ADMIN — MEROPENEM 200 MILLIGRAM(S): 1 INJECTION INTRAVENOUS at 05:40

## 2017-08-19 RX ADMIN — POTASSIUM PHOSPHATE, MONOBASIC POTASSIUM PHOSPHATE, DIBASIC 63.75 MILLIMOLE(S): 236; 224 INJECTION, SOLUTION INTRAVENOUS at 15:52

## 2017-08-19 RX ADMIN — DEXTROSE MONOHYDRATE, SODIUM CHLORIDE, AND POTASSIUM CHLORIDE 125 MILLILITER(S): 50; .745; 4.5 INJECTION, SOLUTION INTRAVENOUS at 16:54

## 2017-08-19 RX ADMIN — ATENOLOL 50 MILLIGRAM(S): 25 TABLET ORAL at 05:40

## 2017-08-19 RX ADMIN — MEROPENEM 200 MILLIGRAM(S): 1 INJECTION INTRAVENOUS at 15:51

## 2017-08-19 RX ADMIN — MEROPENEM 200 MILLIGRAM(S): 1 INJECTION INTRAVENOUS at 22:37

## 2017-08-19 RX ADMIN — BUPROPION HYDROCHLORIDE 300 MILLIGRAM(S): 150 TABLET, EXTENDED RELEASE ORAL at 13:06

## 2017-08-19 RX ADMIN — LIDOCAINE 1 PATCH: 4 CREAM TOPICAL at 00:29

## 2017-08-19 RX ADMIN — LIDOCAINE 1 PATCH: 4 CREAM TOPICAL at 13:05

## 2017-08-19 NOTE — PROGRESS NOTE ADULT - SUBJECTIVE AND OBJECTIVE BOX
Subjective Complaints:  Historian:  Complaining        REVIEW OF SYSTEMS:  Eyes:  Good vision, no reported pain  ENT:  No sore throat, pain, runny nose, dysphagia  CV:  No pain, palpitatioins, hypo/hypertension  Resp:  No dyspnea, cough, tachypnea, wheezing  GI:  No pain, nausea, vomiting, diarrhea, constipatiion  Muscle:  No pain, weakness  Neuro:  Right upper extremity weakness weakness,no tingling,   Psych:  No fatigue, insomnia, mood problems, depression  Endocrine:  No polyuria, polydypsia, cold/heat intolerance    Vital Signs Last 24 Hrs  T(C): 36.8 (19 Aug 2017 10:30), Max: 37 (19 Aug 2017 01:00)  T(F): 98.2 (19 Aug 2017 10:30), Max: 98.6 (19 Aug 2017 01:00)  HR: 61 (19 Aug 2017 10:30) (61 - 79)  BP: 125/90 (19 Aug 2017 10:30) (125/88 - 168/87)  BP(mean): --  RR: 17 (19 Aug 2017 10:30) (16 - 18)  SpO2: 95% (19 Aug 2017 10:30) (93% - 96%)    GENERAL PHYSICAL EXAM:  General:  Appears stated age, well-groomed, well-nourished, no distress  HEENT:  NC/AT, patent nares w/ pink mucosa, OP clear w/o lesions, PERRL, EOMI, conjunctivae clear, no thyromegaly, nodules, adenopathy, no JVD  Chest:  Full & symmetric excursion, no increased effort, breath sounds clear  Cardiovascular:  Regular rhythm, S1, S2, no murmur/rub/S3/S4, no carotid/femoral/abdominal bruit, radial/pedal pulses 2+, no edema  Abdomen:  Soft, non-tender, non-distended, normoactive bowel sounds, no HSM  Extremities:  Gait & station:   Digits:   Nails:   Joints, Bones, Muscles:   ROM:   Stability: right upper extremity weakness  Skin:  No rash/erythema/ecchymoses/petechiae/wounds/abscess/warm/dry  Musculoskeletal:  Full ROM in all joints w/o swelling/tenderness/effusion    NEUROLOGICAL EXAM:  HENT:  Normocephalic head; atraumatic head.  Neck supple.  ENT: normal looking.  Mental State:    Alert.  Fully oriented to person, place and date.  Coherent.  Speech clear and intact.  Cooperative.  Responds appropriately.    Cranial Nerves:  II-XII:   Pupils round and reactive to light and accommodation.  Extraocular movements full.  Visual fields full (no homonymous hemianopsia).  Visual acuity wnl.  Facial symmetry intact.  Tongue midline.  Motor Functions:  right upper extremity weakness otherwise 4/5H/o of fever witho    Sensory Functions:   Intact to touch and pinprick to face and extremities.    Reflexes:  Deep tendon reflexes normoactive to biceps, knees and ankles.  Babinski absent (present).  Cerebellar Testing:    Finger to nose intact.  Nystagmus absent.  Neurovascular: Carotid auscultation full without bruits.      LABS:                        13.6   10.1  )-----------( 386      ( 19 Aug 2017 07:44 )             39.6     08-19    148<H>  |  109<H>  |  27<H>  ----------------------------<  161<H>  3.5   |  29  |  0.70    Ca    8.1<L>      19 Aug 2017 07:44  Phos  2.0     08-19  Mg     2.9     08-19    TPro  6.7  /  Alb  2.5<L>  /  TBili  0.6  /  DBili  x   /  AST  37  /  ALT  56  /  AlkPhos  54  08-18            RADIOLOGY & ADDITIONAL STUDIES:    Lactate Dehydrogenase, Serum: STAT (08-18 @ 15:58)  Vital Signs:     Frequency:  every 4 hours (08-18 @ 16:32)  Lactate, Blood: AM Sched. Collection: 19-Aug-2017 05:00 (08-18 @ 16:32)  Lactate, Blood: STAT (08-18 @ 16:32)  Xray Abdomen Multiple Views: AM   Indication: sbo  Transport: Stret[facility]  Addl Info: Flat and upright images  Exam Completed  Provider's Contact #: [phone] (08-18 @ 17:15)  benzocaine 15 mG/menthol 3.6 mG Lozenge: [Ordered as CEPACOL SORE THROAT]  1 Lozenge, Oral, every 6 hours, PRN for Sore Throat  Administration Instructions: Allow lozenge to dissolve slowly in mouth.  Provider's Contact #: [phone] (08-18 @ 20:50)  Blood Glucose Point Of Care Testing:     Frequency:  every 6 hours    Additional Instructions:  IF NPO Fingersticks every 6 hours. (08-18 @ 22:48)  Complete Blood Count: AM Sched. Collection: 19-Aug-2017 05:00  Cancel Reason: Dup Cancel (08-19 @ 00:08)  potassium phosphate IVPB:   15 milliMole(s) in sodium chloride 0.9% 250 milliLiter(s), IV Intermittent, once, infuse over 4 Hour(s), Stop After 1 Doses  Provider's Contact #: [phone] (08-19 @ 10:13)  Phosphorus Level, Serum: AM Sched. Collection: 20-Aug-2017 05:00 (08-19 @ 10:13)  Magnesium, Serum: AM Sched. Collection: 20-Aug-2017 05:00 (08-19 @ 10:13)  Comprehensive Metabolic Panel: AM Sched. 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  I spent 15-20 mns explaining the the condition to the patient daughter

## 2017-08-19 NOTE — PROGRESS NOTE ADULT - SUBJECTIVE AND OBJECTIVE BOX
INTERVAL HPI/OVERNIGHT EVENTS:      Remains NPO with NGT in place.  Surgical notes appreciated.  No fever today.  Leucocytosis resolving.  For CT enterography tomorrow.      Antimicrobial:  meropenem IVPB 500 milliGRAM(s) IV Intermittent every 8 hours    Cardiovascular:  ATENolol  Tablet 50 milliGRAM(s) Oral daily    Pulmonary:    Hematalogic:  aspirin enteric coated 81 milliGRAM(s) Oral daily  rivaroxaban 20 milliGRAM(s) Oral <User Schedule>    Other:  insulin lispro (HumaLOG) corrective regimen sliding scale   SubCutaneous three times a day before meals  dextrose 5%. 1000 milliLiter(s) IV Continuous <Continuous>  dextrose Gel 1 Dose(s) Oral once PRN  dextrose 50% Injectable 12.5 Gram(s) IV Push once  dextrose 50% Injectable 25 Gram(s) IV Push once  dextrose 50% Injectable 25 Gram(s) IV Push once  glucagon  Injectable 1 milliGRAM(s) IntraMuscular once PRN  oxyCODONE    5 mG/acetaminophen 325 mG 1 Tablet(s) Oral every 6 hours PRN  lidocaine   Patch 1 Patch Transdermal daily  acetaminophen   Tablet 650 milliGRAM(s) Oral every 6 hours PRN  buPROPion XL . 300 milliGRAM(s) Oral daily  diphenhydrAMINE   Capsule 25 milliGRAM(s) Oral once PRN  ondansetron Injectable 4 milliGRAM(s) IV Push every 6 hours PRN  benzocaine 15 mG/menthol 3.6 mG Lozenge 1 Lozenge Oral every 6 hours PRN  dextrose 5% + sodium chloride 0.45% with potassium chloride 20 mEq/L 1000 milliLiter(s) IV Continuous <Continuous>  pantoprazole  Injectable 40 milliGRAM(s) IV Push daily    aspirin enteric coated 81 milliGRAM(s) Oral daily  ATENolol  Tablet 50 milliGRAM(s) Oral daily  insulin lispro (HumaLOG) corrective regimen sliding scale   SubCutaneous three times a day before meals  dextrose 5%. 1000 milliLiter(s) IV Continuous <Continuous>  dextrose Gel 1 Dose(s) Oral once PRN  dextrose 50% Injectable 12.5 Gram(s) IV Push once  dextrose 50% Injectable 25 Gram(s) IV Push once  dextrose 50% Injectable 25 Gram(s) IV Push once  glucagon  Injectable 1 milliGRAM(s) IntraMuscular once PRN  oxyCODONE    5 mG/acetaminophen 325 mG 1 Tablet(s) Oral every 6 hours PRN  lidocaine   Patch 1 Patch Transdermal daily  acetaminophen   Tablet 650 milliGRAM(s) Oral every 6 hours PRN  buPROPion XL . 300 milliGRAM(s) Oral daily  meropenem IVPB 500 milliGRAM(s) IV Intermittent every 8 hours  rivaroxaban 20 milliGRAM(s) Oral <User Schedule>  diphenhydrAMINE   Capsule 25 milliGRAM(s) Oral once PRN  ondansetron Injectable 4 milliGRAM(s) IV Push every 6 hours PRN  benzocaine 15 mG/menthol 3.6 mG Lozenge 1 Lozenge Oral every 6 hours PRN  dextrose 5% + sodium chloride 0.45% with potassium chloride 20 mEq/L 1000 milliLiter(s) IV Continuous <Continuous>  pantoprazole  Injectable 40 milliGRAM(s) IV Push daily    Drug Dosing Weight  Height (cm): 182.88 (12 Aug 2017 10:02)  Weight (kg): 83.9 (12 Aug 2017 18:00)  BMI (kg/m2): 25.1 (12 Aug 2017 18:00)  BSA (m2): 2.06 (12 Aug 2017 18:00)        SCALES: [ ] YES [ ] NO    DATE INSERTED:  REMOVE:  [ ] YES [ ] NO  EXPLAIN:      PAST MEDICAL & SURGICAL HISTORY:  Diabetes  HTN (hypertension)      REVIEW OF SYSTEMS:    CONSTITUTIONAL: No fever, weight loss, or fatigue  EYES: No eye pain, visual disturbances, or discharge  ENMT:  No difficulty hearing, tinnitus, vertigo; No sinus or throat pain  NECK: No pain or stiffness  BREASTS: No pain, masses, or nipple discharge  RESPIRATORY: No cough, wheezing, chills or hemoptysis; No shortness of breath  CARDIOVASCULAR: No chest pain, palpitations, dizziness, or leg swelling  GASTROINTESTINAL: No abdominal or epigastric pain. No nausea, vomiting, or hematemesis; No diarrhea or constipation. No melena or hematochezia.  GENITOURINARY: No dysuria, frequency, hematuria, or incontinence  NEUROLOGICAL: No headaches, memory loss, loss of strength, numbness, or tremors  SKIN: No itching, burning, rashes, or lesions   LYMPH NODES: No enlarged glands  ENDOCRINE: No heat or cold intolerance; No hair loss  MUSCULOSKELETAL: No joint pain or swelling; No muscle, back, or extremity pain  PSYCHIATRIC: No depression, anxiety, mood swings, or difficulty sleeping  HEME/LYMPH: No easy bruising, or bleeding gums  ALLERGY AND IMMUNOLOGIC: No hives or eczema      T(C): 36.8 (08-19-17 @ 10:30), Max: 37 (08-19-17 @ 01:00)  HR: 61 (08-19-17 @ 10:30) (61 - 79)  BP: 125/90 (08-19-17 @ 10:30) (125/90 - 168/87)  RR: 17 (08-19-17 @ 10:30) (16 - 18)  SpO2: 95% (08-19-17 @ 10:30) (93% - 96%)  Wt(kg): --        I&O's Detail    18 Aug 2017 07:01  -  19 Aug 2017 07:00  --------------------------------------------------------  IN:    sodium chloride 0.9%: 1200 mL    Solution: 100 mL  Total IN: 1300 mL    OUT:    Nasoenteral Tube: 600 mL    Voided: 1000 mL  Total OUT: 1600 mL    Total NET: -300 mL      19 Aug 2017 07:01  -  19 Aug 2017 16:27  --------------------------------------------------------  IN:  Total IN: 0 mL    OUT:    Nasoenteral Tube: 1000 mL  Total OUT: 1000 mL    Total NET: -1000 mL            PHYSICAL EXAM:    GENERAL: NAD, well-groomed, well-developed  HEAD:  Atraumatic, Normocephalic  EYES: EOMI, PERRLA, conjunctiva and sclera clear  ENMT: No tonsillar erythema, exudates, or enlargement; Moist mucous membranes, Good dentition, No lesions  NECK: Supple, No JVD, Normal thyroid  NERVOUS SYSTEM:  Alert & Oriented X3, Good concentration; Motor Strength 5/5 B/L upper and lower extremities; DTRs 2+ intact and symmetric  CHEST/LUNG: Clear to percussion bilaterally; No rales, rhonchi, wheezing, or rubs  HEART: Regular rate and rhythm; No murmurs, rubs, or gallops  ABDOMEN: Soft, Nontender, Nondistended; Bowel sounds present  EXTREMITIES:  2+ Peripheral Pulses, No clubbing, cyanosis, or edema  LYMPH: No lymphadenopathy noted  SKIN: No rashes or lesions      LABS:  CBC Full  -  ( 19 Aug 2017 07:44 )  WBC Count : 10.1 K/uL  Hemoglobin : 13.6 g/dL  Hematocrit : 39.6 %  Platelet Count - Automated : 386 K/uL  Mean Cell Volume : 88.7 fl  Mean Cell Hemoglobin : 30.5 pg  Mean Cell Hemoglobin Concentration : 34.4 gm/dL  Auto Neutrophil # : x  Auto Lymphocyte # : x  Auto Monocyte # : x  Auto Eosinophil # : x  Auto Basophil # : x  Auto Neutrophil % : x  Auto Lymphocyte % : x  Auto Monocyte % : x  Auto Eosinophil % : x  Auto Basophil % : x    08-19    148<H>  |  109<H>  |  27<H>  ----------------------------<  161<H>  3.5   |  29  |  0.70    Ca    8.1<L>      19 Aug 2017 07:44  Phos  2.0     08-19  Mg     2.9     08-19    TPro  6.7  /  Alb  2.5<L>  /  TBili  0.6  /  DBili  x   /  AST  37  /  ALT  56  /  AlkPhos  54  08-18            RADIOLOGY & ADDITIONAL STUDIES:

## 2017-08-19 NOTE — PROGRESS NOTE ADULT - SUBJECTIVE AND OBJECTIVE BOX
Patient seen and examined bedside with wife present.  Pt c/o feeling hungry, thirsty and weak.  No other complaints. No abdominal pain.   +Flatus. Denies dysuria, f/c, cp, dyspnea.    T(F): 98.2 (08-19-17 @ 10:30), Max: 98.6 (08-19-17 @ 01:00)  HR: 61 (08-19-17 @ 10:30) (61 - 79)  BP: 125/90 (08-19-17 @ 10:30) (125/90 - 168/87)  RR: 17 (08-19-17 @ 10:30) (16 - 18)  SpO2: 95% (08-19-17 @ 10:30) (93% - 96%)  Wt(kg): --  CAPILLARY BLOOD GLUCOSE  158 (19 Aug 2017 11:44)  174 (19 Aug 2017 05:30)  146 (18 Aug 2017 22:20)  172 (18 Aug 2017 17:03)      PHYSICAL EXAM:  General: NAD, WDWN  Neuro:  Alert & oriented x 3  HEENT: NGT to LWS draining bilious output 600cc/24 hours. 1000cc since 7am.  CV: +S1+S2 regular rate and rhythm  Lung: clear to ausculation bilaterally, respirations nonlabored, good inspiratory effort  Abdomen: soft, ND. No guarding or rebound tenderness. mild tenderness to palpation in RLQ.  Extremities: no pedal edema or calf tenderness noted. Weakness LLE and RLE  : no suprapubic tenderness. Yellow urine in urinal at bedside    LABS:                        13.6   10.1  )-----------( 386      ( 19 Aug 2017 07:44 )             39.6     08-19    148<H>  |  109<H>  |  27<H>  ----------------------------<  161<H>  3.5   |  29  |  0.70    Ca    8.1<L>      19 Aug 2017 07:44  Phos  2.0     08-19  Mg     2.9     08-19    TPro  6.7  /  Alb  2.5<L>  /  TBili  0.6  /  DBili  x   /  AST  37  /  ALT  56  /  AlkPhos  54  08-18      I&O:   1300/1600cc      < from: CT Abdomen and Pelvis w/ Oral Cont and w/ IV Cont (08.18.17 @ 13:55) >    Mid to distal small bowel bowel obstruction with the transition point in   the central abdomen. There is mild wall thickening of the bowel loop just   distal to the transition point which may be related to inflammatory,   ischemic or infectious etiologies.     New small abdominal and pelvic ascites and trace free fluid in the   mesentery. New trace bilateral pleural effusions with associated   atelectasis.    < end of copied text >      < from: Xray Abdomen Multiple Views (08.19.17 @ 12:39) >   No significant change. Radiographic appearance is again consistent with   a partial small bowel obstruction with probable distal ileal transition.        < end of copied text >        Impression: 66y Male PMH DM and HTN admitted with fever, new AF, now with PSBO vs. ileus   Pt is now afebrile since 8/14/17, leukocytosis now resolved on merrem.     Plan:  -IVF changed to D5 1/2NS +KCl  -supplement hypophosphatemia IVPB  -IV protonix added for VTE ppx  -Incentive spirometry use  -continue NGT decompression to LWS and NPO except meds  -Per Dr Hernández, will perform CT enterrography tomorrow. All explained to patient and wife at bedside.  -continue present medical management and abx per ID. Neurology and cardio Follow up. Patient seen and examined bedside with wife present.  Pt c/o feeling hungry, thirsty and weak.  No other complaints. No abdominal pain.   +Flatus. Denies dysuria, f/c, cp, dyspnea.    T(F): 98.2 (08-19-17 @ 10:30), Max: 98.6 (08-19-17 @ 01:00)  HR: 61 (08-19-17 @ 10:30) (61 - 79)  BP: 125/90 (08-19-17 @ 10:30) (125/90 - 168/87)  RR: 17 (08-19-17 @ 10:30) (16 - 18)  SpO2: 95% (08-19-17 @ 10:30) (93% - 96%)  Wt(kg): --  CAPILLARY BLOOD GLUCOSE  158 (19 Aug 2017 11:44)  174 (19 Aug 2017 05:30)  146 (18 Aug 2017 22:20)  172 (18 Aug 2017 17:03)      PHYSICAL EXAM:  General: NAD, WDWN  Neuro:  Alert & oriented x 3  HEENT: NGT to LWS draining bilious output 600cc/24 hours. 1000cc since 7am.  CV: +S1+S2 regular rate and rhythm  Lung: clear to ausculation bilaterally, respirations nonlabored, good inspiratory effort  Abdomen: soft, ND. No guarding or rebound tenderness. mild tenderness to palpation in LLQ.  Extremities: no pedal edema or calf tenderness noted. Weakness LLE and RLE  : no suprapubic tenderness. Yellow urine in urinal at bedside    LABS:                        13.6   10.1  )-----------( 386      ( 19 Aug 2017 07:44 )             39.6     08-19    148<H>  |  109<H>  |  27<H>  ----------------------------<  161<H>  3.5   |  29  |  0.70    Ca    8.1<L>      19 Aug 2017 07:44  Phos  2.0     08-19  Mg     2.9     08-19    TPro  6.7  /  Alb  2.5<L>  /  TBili  0.6  /  DBili  x   /  AST  37  /  ALT  56  /  AlkPhos  54  08-18      I&O:   1300/1600cc      < from: CT Abdomen and Pelvis w/ Oral Cont and w/ IV Cont (08.18.17 @ 13:55) >    Mid to distal small bowel bowel obstruction with the transition point in   the central abdomen. There is mild wall thickening of the bowel loop just   distal to the transition point which may be related to inflammatory,   ischemic or infectious etiologies.     New small abdominal and pelvic ascites and trace free fluid in the   mesentery. New trace bilateral pleural effusions with associated   atelectasis.    < end of copied text >      < from: Xray Abdomen Multiple Views (08.19.17 @ 12:39) >   No significant change. Radiographic appearance is again consistent with   a partial small bowel obstruction with probable distal ileal transition.        < end of copied text >    Lactate, Blood (08.19.17 @ 07:44)    Lactate, Blood: 1.2 mmol/L        Impression: 66y Male PMH DM and HTN admitted with fever, new AF, left sided weakness, now with PSBO vs. ileus   Pt is now afebrile since 8/14/17, leukocytosis now resolved on merrem.     Plan:  -IVF changed to D5 1/2NS +KCl  -supplement hypophosphatemia IVPB  -IV protonix added for VTE ppx  -Incentive spirometry use  -continue NGT decompression to LWS and NPO except meds  -Per Dr Hernández, will perform CT enterrography tomorrow. All explained to patient and wife at bedside.  -continue present medical management and abx per ID. Neurology and cardio Follow up. Patient seen and examined bedside with wife present.  Pt c/o feeling hungry, thirsty and weak.  No other complaints. No abdominal pain.   +Flatus. Denies dysuria, f/c, cp, dyspnea.    T(F): 98.2 (08-19-17 @ 10:30), Max: 98.6 (08-19-17 @ 01:00)  HR: 61 (08-19-17 @ 10:30) (61 - 79)  BP: 125/90 (08-19-17 @ 10:30) (125/90 - 168/87)  RR: 17 (08-19-17 @ 10:30) (16 - 18)  SpO2: 95% (08-19-17 @ 10:30) (93% - 96%)  Wt(kg): --  CAPILLARY BLOOD GLUCOSE  158 (19 Aug 2017 11:44)  174 (19 Aug 2017 05:30)  146 (18 Aug 2017 22:20)  172 (18 Aug 2017 17:03)      PHYSICAL EXAM:  General: NAD, WDWN  Neuro:  Alert & oriented x 3  HEENT: NGT to LWS draining bilious output 600cc/24 hours. 1000cc since 7am.  CV: +S1+S2 regular rate and rhythm  Lung: clear to ausculation bilaterally, respirations nonlabored, good inspiratory effort  Abdomen: soft, ND. No guarding or rebound tenderness. mild tenderness to palpation in LLQ.  Extremities: no pedal edema or calf tenderness noted. Weakness LLE and RLE  : no suprapubic tenderness. Yellow urine in urinal at bedside    LABS:                        13.6   10.1  )-----------( 386      ( 19 Aug 2017 07:44 )             39.6     08-19    148<H>  |  109<H>  |  27<H>  ----------------------------<  161<H>  3.5   |  29  |  0.70    Ca    8.1<L>      19 Aug 2017 07:44  Phos  2.0     08-19  Mg     2.9     08-19    TPro  6.7  /  Alb  2.5<L>  /  TBili  0.6  /  DBili  x   /  AST  37  /  ALT  56  /  AlkPhos  54  08-18      I&O:   1300/1600cc      < from: CT Abdomen and Pelvis w/ Oral Cont and w/ IV Cont (08.18.17 @ 13:55) >    Mid to distal small bowel bowel obstruction with the transition point in   the central abdomen. There is mild wall thickening of the bowel loop just   distal to the transition point which may be related to inflammatory,   ischemic or infectious etiologies.     New small abdominal and pelvic ascites and trace free fluid in the   mesentery. New trace bilateral pleural effusions with associated   atelectasis.    < end of copied text >      < from: Xray Abdomen Multiple Views (08.19.17 @ 12:39) >   No significant change. Radiographic appearance is again consistent with   a partial small bowel obstruction with probable distal ileal transition.        < end of copied text >    Lactate, Blood (08.19.17 @ 07:44)    Lactate, Blood: 1.2 mmol/L        Impression: 66y Male PMH DM and HTN admitted with fever, new AF, left sided weakness, now with PSBO vs. ileus   Pt is now afebrile since 8/14/17, leukocytosis now resolved on merrem. Also with severe protein calorie malnutrition    Plan:  -IVF changed to D5 1/2NS +KCl  -supplement hypophosphatemia IVPB  -IV protonix added for VTE ppx  -Incentive spirometry use  -continue NGT decompression to LWS and NPO except meds  -Per Dr Hernández, will perform CT enterography tomorrow. All explained to patient and wife at bedside.  -continue present medical management and abx per ID. Neurology and cardio Follow up.  -case discussed in detail with Dr Raymond. Possibility of initiating PPN discussed. Will Follow up results of CT eneterography tomorrow and likely d/c NGT and advance diet as tolerated.

## 2017-08-19 NOTE — PROGRESS NOTE ADULT - SUBJECTIVE AND OBJECTIVE BOX
Patient is a 66y old  Male who presents with a chief complaint of fever, abdominal pain (12 Aug 2017 15:51)        PAST MEDICAL & SURGICAL HISTORY:  Diabetes  HTN (hypertension)      Summary of admission HPI: NSTEMI CVA                PREVIOUS DIAGNOSTIC TESTING:      ECHO  FINDINGS:    STRESS  FINDINGS:    CATHETERIZATION  FINDINGS:    ELECTROPHYSIOLOGY STUDY  FINDINGS:    CAROTID ULTRASOUND:  FINDINGS    VENOUS DUPLEX SCAN:  FINDINGS:    CHEST CT PULMONARY ANGIO with IV Contrast:  FINDINGS:  MEDICATIONS  (STANDING):  aspirin enteric coated 81 milliGRAM(s) Oral daily  ATENolol  Tablet 50 milliGRAM(s) Oral daily  insulin lispro (HumaLOG) corrective regimen sliding scale   SubCutaneous three times a day before meals  dextrose 5%. 1000 milliLiter(s) (50 mL/Hr) IV Continuous <Continuous>  dextrose 50% Injectable 12.5 Gram(s) IV Push once  dextrose 50% Injectable 25 Gram(s) IV Push once  dextrose 50% Injectable 25 Gram(s) IV Push once  lidocaine   Patch 1 Patch Transdermal daily  buPROPion XL . 300 milliGRAM(s) Oral daily  meropenem IVPB 500 milliGRAM(s) IV Intermittent every 8 hours  rivaroxaban 20 milliGRAM(s) Oral <User Schedule>  dextrose 5% + sodium chloride 0.45% with potassium chloride 20 mEq/L 1000 milliLiter(s) (125 mL/Hr) IV Continuous <Continuous>  pantoprazole  Injectable 40 milliGRAM(s) IV Push daily    MEDICATIONS  (PRN):  dextrose Gel 1 Dose(s) Oral once PRN Blood Glucose LESS THAN 70 milliGRAM(s)/deciliter  glucagon  Injectable 1 milliGRAM(s) IntraMuscular once PRN Glucose LESS THAN 70 milligrams/deciliter  oxyCODONE    5 mG/acetaminophen 325 mG 1 Tablet(s) Oral every 6 hours PRN Moderate Pain (4 - 6)  acetaminophen   Tablet 650 milliGRAM(s) Oral every 6 hours PRN For Temp greater than 38 C (100.4 F)  diphenhydrAMINE   Capsule 25 milliGRAM(s) Oral once PRN Insomnia  ondansetron Injectable 4 milliGRAM(s) IV Push every 6 hours PRN Nausea and/or Vomiting  benzocaine 15 mG/menthol 3.6 mG Lozenge 1 Lozenge Oral every 6 hours PRN Sore Throat      FAMILY HISTORY:      SOCIAL HISTORY:    CIGARETTES:    ALCOHOL:    REVIEW OF SYSTEMS:    CONSTITUTIONAL: No fever, weight loss, chills, shakes, or fatigue  EYES: No eye pain, visual disturbances, or discharge  ENMT:  No difficulty hearing, tinnitus, vertigo; No sinus or throat pain  NECK: No pain or stiffness  BREASTS: No pain, masses, or nipple discharge  RESPIRATORY: No cough, wheezing, hemoptysis, or shortness of breath  CARDIOVASCULAR: No chest pain, dyspnea, palpitations, dizziness, syncope, paroxysmal nocturnal dyspnea, orthopnea, or arm or leg swelling  GASTROINTESTINAL: No abdominal  or epigastric pain, nausea, vomiting, hematemesis, diarrhea, constipation, melena or bright red blood.  GENITOURINARY: No dysuria, nocturia, hematuria, or urinary incontinence  NEUROLOGICAL: No headaches, memory loss, slurred speech, limb weakness, loss of strength, numbness, or tremors  SKIN: No itching, burning, rashes, or lesions   LYMPH NODES: No enlarged glands  ENDOCRINE: No heat or cold intolerance, or hair loss  MUSCULOSKELETAL: No joint pain or swelling, muscle, back, or extremity pain  PSYCHIATRIC: No depression, anxiety, or difficulty sleeping  HEME/LYMPH: No easy bruising or bleeding gums  ALLERY AND IMMUNOLOGIC: No hives or rash.      Vital Signs Last 24 Hrs  T(C): 36.8 (19 Aug 2017 10:30), Max: 37 (19 Aug 2017 01:00)  T(F): 98.2 (19 Aug 2017 10:30), Max: 98.6 (19 Aug 2017 01:00)  HR: 61 (19 Aug 2017 10:30) (61 - 79)  BP: 125/90 (19 Aug 2017 10:30) (125/90 - 168/87)  BP(mean): --  RR: 17 (19 Aug 2017 10:30) (16 - 18)  SpO2: 95% (19 Aug 2017 10:30) (93% - 96%)    PHYSICAL EXAM:    GENERAL: In no apparent distress, well nourished, and hydrated.  HEAD:  Atraumatic, Normocephalic  EYES: EOMI, PERRLA, conjunctiva and sclera clear  ENMT: No tonsillar erythema, exudates, or enlargement; Moist mucous membranes, Good dentition, No lesions  NECK: Supple and normal thyroid.  No JVD or carotid bruit.  Carotid pulse is 2+ bilaterally.  HEART: Regular rate and rhythm; No murmurs, rubs, or gallops.  PULMONARY: Clear to auscultation and perfusion.  No rales, wheezing, or rhonchi bilaterally.  ABDOMEN: Soft, Nontender, Nondistended; Bowel sounds present  EXTREMITIES:  2+ Peripheral Pulses, No clubbing, cyanosis, or edema  LYMPH: No lymphadenopathy noted  NEUROLOGICAL: Grossly nonfocal          INTERPRETATION OF TELEMETRY:    ECG:    I&O's Detail    18 Aug 2017 07:01  -  19 Aug 2017 07:00  --------------------------------------------------------  IN:    sodium chloride 0.9%: 1200 mL    Solution: 100 mL  Total IN: 1300 mL    OUT:    Nasoenteral Tube: 600 mL    Voided: 1000 mL  Total OUT: 1600 mL    Total NET: -300 mL      19 Aug 2017 07:01  -  19 Aug 2017 17:07  --------------------------------------------------------  IN:  Total IN: 0 mL    OUT:    Nasoenteral Tube: 1000 mL  Total OUT: 1000 mL    Total NET: -1000 mL          LABS:                        13.6   10.1  )-----------( 386      ( 19 Aug 2017 07:44 )             39.6     08-19    148<H>  |  109<H>  |  27<H>  ----------------------------<  161<H>  3.5   |  29  |  0.70    Ca    8.1<L>      19 Aug 2017 07:44  Phos  2.0     08-19  Mg     2.9     08-19    TPro  6.7  /  Alb  2.5<L>  /  TBili  0.6  /  DBili  x   /  AST  37  /  ALT  56  /  AlkPhos  54  08-18    CARDIAC MARKERS ( 18 Aug 2017 04:03 )  .022 ng/mL / x     / 74 U/L / x     / 2.1 ng/mL          BNP  I&O's Detail    18 Aug 2017 07:01  -  19 Aug 2017 07:00  --------------------------------------------------------  IN:    sodium chloride 0.9%: 1200 mL    Solution: 100 mL  Total IN: 1300 mL    OUT:    Nasoenteral Tube: 600 mL    Voided: 1000 mL  Total OUT: 1600 mL    Total NET: -300 mL      19 Aug 2017 07:01  -  19 Aug 2017 17:07  --------------------------------------------------------  IN:  Total IN: 0 mL    OUT:    Nasoenteral Tube: 1000 mL  Total OUT: 1000 mL    Total NET: -1000 mL        Daily     Daily     RADIOLOGY & ADDITIONAL STUDIES:

## 2017-08-20 LAB
ALBUMIN SERPL ELPH-MCNC: 2.5 G/DL — LOW (ref 3.3–5)
ALP SERPL-CCNC: 52 U/L — SIGNIFICANT CHANGE UP (ref 40–120)
ALT FLD-CCNC: 43 U/L — SIGNIFICANT CHANGE UP (ref 12–78)
ANION GAP SERPL CALC-SCNC: 10 MMOL/L — SIGNIFICANT CHANGE UP (ref 5–17)
AST SERPL-CCNC: 20 U/L — SIGNIFICANT CHANGE UP (ref 15–37)
BILIRUB SERPL-MCNC: 0.5 MG/DL — SIGNIFICANT CHANGE UP (ref 0.2–1.2)
BUN SERPL-MCNC: 21 MG/DL — SIGNIFICANT CHANGE UP (ref 7–23)
CALCIUM SERPL-MCNC: 8.1 MG/DL — LOW (ref 8.5–10.1)
CHLORIDE SERPL-SCNC: 108 MMOL/L — SIGNIFICANT CHANGE UP (ref 96–108)
CO2 SERPL-SCNC: 27 MMOL/L — SIGNIFICANT CHANGE UP (ref 22–31)
CREAT SERPL-MCNC: 0.64 MG/DL — SIGNIFICANT CHANGE UP (ref 0.5–1.3)
GLUCOSE SERPL-MCNC: 170 MG/DL — HIGH (ref 70–99)
HCT VFR BLD CALC: 40.6 % — SIGNIFICANT CHANGE UP (ref 39–50)
HGB BLD-MCNC: 14 G/DL — SIGNIFICANT CHANGE UP (ref 13–17)
MAGNESIUM SERPL-MCNC: 2.8 MG/DL — HIGH (ref 1.6–2.6)
MCHC RBC-ENTMCNC: 31.4 PG — SIGNIFICANT CHANGE UP (ref 27–34)
MCHC RBC-ENTMCNC: 34.5 GM/DL — SIGNIFICANT CHANGE UP (ref 32–36)
MCV RBC AUTO: 91.2 FL — SIGNIFICANT CHANGE UP (ref 80–100)
PHOSPHATE SERPL-MCNC: 1.9 MG/DL — LOW (ref 2.5–4.5)
PLATELET # BLD AUTO: 378 K/UL — SIGNIFICANT CHANGE UP (ref 150–400)
POTASSIUM SERPL-MCNC: 3.8 MMOL/L — SIGNIFICANT CHANGE UP (ref 3.5–5.3)
POTASSIUM SERPL-SCNC: 3.8 MMOL/L — SIGNIFICANT CHANGE UP (ref 3.5–5.3)
PROT SERPL-MCNC: 6.6 GM/DL — SIGNIFICANT CHANGE UP (ref 6–8.3)
RBC # BLD: 4.45 M/UL — SIGNIFICANT CHANGE UP (ref 4.2–5.8)
RBC # FLD: 11.5 % — SIGNIFICANT CHANGE UP (ref 11–15)
SODIUM SERPL-SCNC: 145 MMOL/L — SIGNIFICANT CHANGE UP (ref 135–145)
WBC # BLD: 9.8 K/UL — SIGNIFICANT CHANGE UP (ref 3.8–10.5)
WBC # FLD AUTO: 9.8 K/UL — SIGNIFICANT CHANGE UP (ref 3.8–10.5)

## 2017-08-20 PROCEDURE — 74177 CT ABD & PELVIS W/CONTRAST: CPT | Mod: 26

## 2017-08-20 RX ORDER — LOSARTAN POTASSIUM 100 MG/1
100 TABLET, FILM COATED ORAL DAILY
Qty: 0 | Refills: 0 | Status: DISCONTINUED | OUTPATIENT
Start: 2017-08-20 | End: 2017-08-28

## 2017-08-20 RX ADMIN — DEXTROSE MONOHYDRATE, SODIUM CHLORIDE, AND POTASSIUM CHLORIDE 125 MILLILITER(S): 50; .745; 4.5 INJECTION, SOLUTION INTRAVENOUS at 05:52

## 2017-08-20 RX ADMIN — Medication 1: at 18:41

## 2017-08-20 RX ADMIN — MEROPENEM 200 MILLIGRAM(S): 1 INJECTION INTRAVENOUS at 05:41

## 2017-08-20 RX ADMIN — LOSARTAN POTASSIUM 100 MILLIGRAM(S): 100 TABLET, FILM COATED ORAL at 18:42

## 2017-08-20 RX ADMIN — PANTOPRAZOLE SODIUM 40 MILLIGRAM(S): 20 TABLET, DELAYED RELEASE ORAL at 12:55

## 2017-08-20 RX ADMIN — Medication 81 MILLIGRAM(S): at 12:54

## 2017-08-20 RX ADMIN — LIDOCAINE 1 PATCH: 4 CREAM TOPICAL at 12:55

## 2017-08-20 RX ADMIN — MEROPENEM 200 MILLIGRAM(S): 1 INJECTION INTRAVENOUS at 21:24

## 2017-08-20 RX ADMIN — MEROPENEM 200 MILLIGRAM(S): 1 INJECTION INTRAVENOUS at 15:32

## 2017-08-20 RX ADMIN — ATENOLOL 50 MILLIGRAM(S): 25 TABLET ORAL at 05:41

## 2017-08-20 RX ADMIN — LIDOCAINE 1 PATCH: 4 CREAM TOPICAL at 01:15

## 2017-08-20 RX ADMIN — Medication 2: at 10:23

## 2017-08-20 RX ADMIN — DEXTROSE MONOHYDRATE, SODIUM CHLORIDE, AND POTASSIUM CHLORIDE 125 MILLILITER(S): 50; .745; 4.5 INJECTION, SOLUTION INTRAVENOUS at 15:31

## 2017-08-20 RX ADMIN — RIVAROXABAN 20 MILLIGRAM(S): KIT at 12:54

## 2017-08-20 RX ADMIN — BUPROPION HYDROCHLORIDE 300 MILLIGRAM(S): 150 TABLET, EXTENDED RELEASE ORAL at 12:55

## 2017-08-20 NOTE — PROGRESS NOTE ADULT - ASSESSMENT
66 year old man with history of hypertension and diabetes mellitus seen in the emergency with and being admitted for fever of one weeks duration and new onset atrial fibrillation and abdominal pain.  Cat scan iof abdomen only showed a non obstructing left intrarenal stone. 66 year old man with history of hypertension and diabetes mellitus seen in the emergency with and being admitted for fever of one weeks duration and new onset atrial fibrillation and abdominal pain.  Cat scan iof abdomen only showed a non obstructing left intrarenal stone.    8/20/17.  Report of CT enterography noted.  Discussed with surgeon.  Wants the NG TUBE in place but patient wants NG tube removed.

## 2017-08-20 NOTE — PROGRESS NOTE ADULT - SUBJECTIVE AND OBJECTIVE BOX
INTERVAL HPI/OVERNIGHT EVENTS:      No new complaints.  Wants to sign out against medical advice.  CT enterography done.  Report noted.    Antimicrobial:  meropenem IVPB 500 milliGRAM(s) IV Intermittent every 8 hours    Cardiovascular:  ATENolol  Tablet 50 milliGRAM(s) Oral daily  losartan 100 milliGRAM(s) Oral daily    Pulmonary:    Hematalogic:  aspirin enteric coated 81 milliGRAM(s) Oral daily  rivaroxaban 20 milliGRAM(s) Oral <User Schedule>    Other:  insulin lispro (HumaLOG) corrective regimen sliding scale   SubCutaneous three times a day before meals  dextrose 5%. 1000 milliLiter(s) IV Continuous <Continuous>  dextrose Gel 1 Dose(s) Oral once PRN  dextrose 50% Injectable 12.5 Gram(s) IV Push once  dextrose 50% Injectable 25 Gram(s) IV Push once  dextrose 50% Injectable 25 Gram(s) IV Push once  glucagon  Injectable 1 milliGRAM(s) IntraMuscular once PRN  oxyCODONE    5 mG/acetaminophen 325 mG 1 Tablet(s) Oral every 6 hours PRN  lidocaine   Patch 1 Patch Transdermal daily  acetaminophen   Tablet 650 milliGRAM(s) Oral every 6 hours PRN  buPROPion XL . 300 milliGRAM(s) Oral daily  diphenhydrAMINE   Capsule 25 milliGRAM(s) Oral once PRN  ondansetron Injectable 4 milliGRAM(s) IV Push every 6 hours PRN  benzocaine 15 mG/menthol 3.6 mG Lozenge 1 Lozenge Oral every 6 hours PRN  dextrose 5% + sodium chloride 0.45% with potassium chloride 20 mEq/L 1000 milliLiter(s) IV Continuous <Continuous>  pantoprazole  Injectable 40 milliGRAM(s) IV Push daily    aspirin enteric coated 81 milliGRAM(s) Oral daily  ATENolol  Tablet 50 milliGRAM(s) Oral daily  insulin lispro (HumaLOG) corrective regimen sliding scale   SubCutaneous three times a day before meals  dextrose 5%. 1000 milliLiter(s) IV Continuous <Continuous>  dextrose Gel 1 Dose(s) Oral once PRN  dextrose 50% Injectable 12.5 Gram(s) IV Push once  dextrose 50% Injectable 25 Gram(s) IV Push once  dextrose 50% Injectable 25 Gram(s) IV Push once  glucagon  Injectable 1 milliGRAM(s) IntraMuscular once PRN  oxyCODONE    5 mG/acetaminophen 325 mG 1 Tablet(s) Oral every 6 hours PRN  lidocaine   Patch 1 Patch Transdermal daily  acetaminophen   Tablet 650 milliGRAM(s) Oral every 6 hours PRN  buPROPion XL . 300 milliGRAM(s) Oral daily  meropenem IVPB 500 milliGRAM(s) IV Intermittent every 8 hours  rivaroxaban 20 milliGRAM(s) Oral <User Schedule>  diphenhydrAMINE   Capsule 25 milliGRAM(s) Oral once PRN  ondansetron Injectable 4 milliGRAM(s) IV Push every 6 hours PRN  benzocaine 15 mG/menthol 3.6 mG Lozenge 1 Lozenge Oral every 6 hours PRN  dextrose 5% + sodium chloride 0.45% with potassium chloride 20 mEq/L 1000 milliLiter(s) IV Continuous <Continuous>  pantoprazole  Injectable 40 milliGRAM(s) IV Push daily  losartan 100 milliGRAM(s) Oral daily    Drug Dosing Weight  Height (cm): 182.88 (12 Aug 2017 10:02)  Weight (kg): 83.9 (12 Aug 2017 18:00)  BMI (kg/m2): 25.1 (12 Aug 2017 18:00)  BSA (m2): 2.06 (12 Aug 2017 18:00)        SCALES: [ ] YES [ ] NO    DATE INSERTED:  REMOVE:  [ ] YES [ ] NO  EXPLAIN:      PAST MEDICAL & SURGICAL HISTORY:  Diabetes  HTN (hypertension)      REVIEW OF SYSTEMS:    CONSTITUTIONAL: No fever, weight loss, or fatigue  EYES: No eye pain, visual disturbances, or discharge  ENMT:  No difficulty hearing, tinnitus, vertigo; No sinus or throat pain  NECK: No pain or stiffness  BREASTS: No pain, masses, or nipple discharge  RESPIRATORY: No cough, wheezing, chills or hemoptysis; No shortness of breath  CARDIOVASCULAR: No chest pain, palpitations, dizziness, or leg swelling  GASTROINTESTINAL: No abdominal or epigastric pain. No nausea, vomiting, or hematemesis; No diarrhea or constipation. No melena or hematochezia.  GENITOURINARY: No dysuria, frequency, hematuria, or incontinence  NEUROLOGICAL: No headaches, memory loss, loss of strength, numbness, or tremors  SKIN: No itching, burning, rashes, or lesions   LYMPH NODES: No enlarged glands  ENDOCRINE: No heat or cold intolerance; No hair loss  MUSCULOSKELETAL: No joint pain or swelling; No muscle, back, or extremity pain  PSYCHIATRIC: No depression, anxiety, mood swings, or difficulty sleeping  HEME/LYMPH: No easy bruising, or bleeding gums  ALLERGY AND IMMUNOLOGIC: No hives or eczema      T(C): 37.1 (08-20-17 @ 16:26), Max: 37.1 (08-20-17 @ 16:26)  HR: 63 (08-20-17 @ 16:26) (55 - 63)  BP: 166/94 (08-20-17 @ 16:26) (151/94 - 171/92)  RR: 18 (08-20-17 @ 16:26) (16 - 18)  SpO2: 94% (08-20-17 @ 16:26) (94% - 98%)  Wt(kg): --        I&O's Detail    19 Aug 2017 07:01  -  20 Aug 2017 07:00  --------------------------------------------------------  IN:    dextrose 5% + sodium chloride 0.45% with potassium chloride 20 mEq/L: 1250 mL    Solution: 300 mL    Solution: 300 mL  Total IN: 1850 mL    OUT:    Nasoenteral Tube: 2200 mL    Voided: 750 mL  Total OUT: 2950 mL    Total NET: -1100 mL            PHYSICAL EXAM:    GENERAL: NAD, well-groomed, well-developed  HEAD:  Atraumatic, Normocephalic  EYES: EOMI, PERRLA, conjunctiva and sclera clear  ENMT: No tonsillar erythema, exudates, or enlargement; Moist mucous membranes, Good dentition, No lesions  NECK: Supple, No JVD, Normal thyroid  NERVOUS SYSTEM:  Alert & Oriented X3, Good concentration; Motor Strength 5/5 B/L upper and lower extremities; DTRs 2+ intact and symmetric  CHEST/LUNG: Clear to percussion bilaterally; No rales, rhonchi, wheezing, or rubs  HEART: Regular rate and rhythm; No murmurs, rubs, or gallops  ABDOMEN: Soft, Nontender, Nondistended; Bowel sounds present  EXTREMITIES:  2+ Peripheral Pulses, No clubbing, cyanosis, or edema  LYMPH: No lymphadenopathy noted  SKIN: No rashes or lesions      LABS:  CBC Full  -  ( 20 Aug 2017 07:49 )  WBC Count : 9.8 K/uL  Hemoglobin : 14.0 g/dL  Hematocrit : 40.6 %  Platelet Count - Automated : 378 K/uL  Mean Cell Volume : 91.2 fl  Mean Cell Hemoglobin : 31.4 pg  Mean Cell Hemoglobin Concentration : 34.5 gm/dL  Auto Neutrophil # : x  Auto Lymphocyte # : x  Auto Monocyte # : x  Auto Eosinophil # : x  Auto Basophil # : x  Auto Neutrophil % : x  Auto Lymphocyte % : x  Auto Monocyte % : x  Auto Eosinophil % : x  Auto Basophil % : x    08-20    145  |  108  |  21  ----------------------------<  170<H>  3.8   |  27  |  0.64    Ca    8.1<L>      20 Aug 2017 07:49  Phos  1.9     08-20  Mg     2.8     08-20    TPro  6.6  /  Alb  2.5<L>  /  TBili  0.5  /  DBili  x   /  AST  20  /  ALT  43  /  AlkPhos  52  08-20            RADIOLOGY & ADDITIONAL STUDIES: INTERVAL HPI/OVERNIGHT EVENTS:      No new complaints.  Wants to sign out against medical advice.  CT enterography done.  Patient wants NG tube removed and wants to be fed.  Report noted.    Antimicrobial:  meropenem IVPB 500 milliGRAM(s) IV Intermittent every 8 hours    Cardiovascular:  ATENolol  Tablet 50 milliGRAM(s) Oral daily  losartan 100 milliGRAM(s) Oral daily    Pulmonary:    Hematalogic:  aspirin enteric coated 81 milliGRAM(s) Oral daily  rivaroxaban 20 milliGRAM(s) Oral <User Schedule>    Other:  insulin lispro (HumaLOG) corrective regimen sliding scale   SubCutaneous three times a day before meals  dextrose 5%. 1000 milliLiter(s) IV Continuous <Continuous>  dextrose Gel 1 Dose(s) Oral once PRN  dextrose 50% Injectable 12.5 Gram(s) IV Push once  dextrose 50% Injectable 25 Gram(s) IV Push once  dextrose 50% Injectable 25 Gram(s) IV Push once  glucagon  Injectable 1 milliGRAM(s) IntraMuscular once PRN  oxyCODONE    5 mG/acetaminophen 325 mG 1 Tablet(s) Oral every 6 hours PRN  lidocaine   Patch 1 Patch Transdermal daily  acetaminophen   Tablet 650 milliGRAM(s) Oral every 6 hours PRN  buPROPion XL . 300 milliGRAM(s) Oral daily  diphenhydrAMINE   Capsule 25 milliGRAM(s) Oral once PRN  ondansetron Injectable 4 milliGRAM(s) IV Push every 6 hours PRN  benzocaine 15 mG/menthol 3.6 mG Lozenge 1 Lozenge Oral every 6 hours PRN  dextrose 5% + sodium chloride 0.45% with potassium chloride 20 mEq/L 1000 milliLiter(s) IV Continuous <Continuous>  pantoprazole  Injectable 40 milliGRAM(s) IV Push daily    aspirin enteric coated 81 milliGRAM(s) Oral daily  ATENolol  Tablet 50 milliGRAM(s) Oral daily  insulin lispro (HumaLOG) corrective regimen sliding scale   SubCutaneous three times a day before meals  dextrose 5%. 1000 milliLiter(s) IV Continuous <Continuous>  dextrose Gel 1 Dose(s) Oral once PRN  dextrose 50% Injectable 12.5 Gram(s) IV Push once  dextrose 50% Injectable 25 Gram(s) IV Push once  dextrose 50% Injectable 25 Gram(s) IV Push once  glucagon  Injectable 1 milliGRAM(s) IntraMuscular once PRN  oxyCODONE    5 mG/acetaminophen 325 mG 1 Tablet(s) Oral every 6 hours PRN  lidocaine   Patch 1 Patch Transdermal daily  acetaminophen   Tablet 650 milliGRAM(s) Oral every 6 hours PRN  buPROPion XL . 300 milliGRAM(s) Oral daily  meropenem IVPB 500 milliGRAM(s) IV Intermittent every 8 hours  rivaroxaban 20 milliGRAM(s) Oral <User Schedule>  diphenhydrAMINE   Capsule 25 milliGRAM(s) Oral once PRN  ondansetron Injectable 4 milliGRAM(s) IV Push every 6 hours PRN  benzocaine 15 mG/menthol 3.6 mG Lozenge 1 Lozenge Oral every 6 hours PRN  dextrose 5% + sodium chloride 0.45% with potassium chloride 20 mEq/L 1000 milliLiter(s) IV Continuous <Continuous>  pantoprazole  Injectable 40 milliGRAM(s) IV Push daily  losartan 100 milliGRAM(s) Oral daily    Drug Dosing Weight  Height (cm): 182.88 (12 Aug 2017 10:02)  Weight (kg): 83.9 (12 Aug 2017 18:00)  BMI (kg/m2): 25.1 (12 Aug 2017 18:00)  BSA (m2): 2.06 (12 Aug 2017 18:00)        SCALES: [ ] YES [ ] NO    DATE INSERTED:  REMOVE:  [ ] YES [ ] NO  EXPLAIN:      PAST MEDICAL & SURGICAL HISTORY:  Diabetes  HTN (hypertension)      REVIEW OF SYSTEMS:    CONSTITUTIONAL: No fever, weight loss, or fatigue  EYES: No eye pain, visual disturbances, or discharge  ENMT:  No difficulty hearing, tinnitus, vertigo; No sinus or throat pain  NECK: No pain or stiffness  BREASTS: No pain, masses, or nipple discharge  RESPIRATORY: No cough, wheezing, chills or hemoptysis; No shortness of breath  CARDIOVASCULAR: No chest pain, palpitations, dizziness, or leg swelling  GASTROINTESTINAL: No abdominal or epigastric pain. No nausea, vomiting, or hematemesis; No diarrhea or constipation. No melena or hematochezia.  GENITOURINARY: No dysuria, frequency, hematuria, or incontinence  NEUROLOGICAL: No headaches, memory loss, loss of strength, numbness, or tremors  SKIN: No itching, burning, rashes, or lesions   LYMPH NODES: No enlarged glands  ENDOCRINE: No heat or cold intolerance; No hair loss  MUSCULOSKELETAL: No joint pain or swelling; No muscle, back, or extremity pain  PSYCHIATRIC: No depression, anxiety, mood swings, or difficulty sleeping  HEME/LYMPH: No easy bruising, or bleeding gums  ALLERGY AND IMMUNOLOGIC: No hives or eczema      T(C): 37.1 (08-20-17 @ 16:26), Max: 37.1 (08-20-17 @ 16:26)  HR: 63 (08-20-17 @ 16:26) (55 - 63)  BP: 166/94 (08-20-17 @ 16:26) (151/94 - 171/92)  RR: 18 (08-20-17 @ 16:26) (16 - 18)  SpO2: 94% (08-20-17 @ 16:26) (94% - 98%)  Wt(kg): --        I&O's Detail    19 Aug 2017 07:01  -  20 Aug 2017 07:00  --------------------------------------------------------  IN:    dextrose 5% + sodium chloride 0.45% with potassium chloride 20 mEq/L: 1250 mL    Solution: 300 mL    Solution: 300 mL  Total IN: 1850 mL    OUT:    Nasoenteral Tube: 2200 mL    Voided: 750 mL  Total OUT: 2950 mL    Total NET: -1100 mL            PHYSICAL EXAM:    GENERAL: NAD, well-groomed, well-developed  HEAD:  Atraumatic, Normocephalic  EYES: EOMI, PERRLA, conjunctiva and sclera clear  ENMT: No tonsillar erythema, exudates, or enlargement; Moist mucous membranes, Good dentition, No lesions  NECK: Supple, No JVD, Normal thyroid  NERVOUS SYSTEM:  Alert & Oriented X3, Good concentration; Motor Strength 5/5 B/L upper and lower extremities; DTRs 2+ intact and symmetric  CHEST/LUNG: Clear to percussion bilaterally; No rales, rhonchi, wheezing, or rubs  HEART: Regular rate and rhythm; No murmurs, rubs, or gallops  ABDOMEN: Soft, Nontender, Nondistended; Bowel sounds present  EXTREMITIES:  2+ Peripheral Pulses, No clubbing, cyanosis, or edema  LYMPH: No lymphadenopathy noted  SKIN: No rashes or lesions      LABS:  CBC Full  -  ( 20 Aug 2017 07:49 )  WBC Count : 9.8 K/uL  Hemoglobin : 14.0 g/dL  Hematocrit : 40.6 %  Platelet Count - Automated : 378 K/uL  Mean Cell Volume : 91.2 fl  Mean Cell Hemoglobin : 31.4 pg  Mean Cell Hemoglobin Concentration : 34.5 gm/dL  Auto Neutrophil # : x  Auto Lymphocyte # : x  Auto Monocyte # : x  Auto Eosinophil # : x  Auto Basophil # : x  Auto Neutrophil % : x  Auto Lymphocyte % : x  Auto Monocyte % : x  Auto Eosinophil % : x  Auto Basophil % : x    08-20    145  |  108  |  21  ----------------------------<  170<H>  3.8   |  27  |  0.64    Ca    8.1<L>      20 Aug 2017 07:49  Phos  1.9     08-20  Mg     2.8     08-20    TPro  6.6  /  Alb  2.5<L>  /  TBili  0.5  /  DBili  x   /  AST  20  /  ALT  43  /  AlkPhos  52  08-20            RADIOLOGY & ADDITIONAL STUDIES:

## 2017-08-20 NOTE — PROGRESS NOTE ADULT - PROBLEM SELECTOR PLAN 8
NG TUBE IN PLACE.  CONTINUE CONSERVATIVE MANAGEMENT.  SURGERY TO FOLLOW. ct enterography report noted.  Remove ng tube abd start clears NG TUBE IN PLACE.  CONTINUE CONSERVATIVE MANAGEMENT.  SURGERY TO FOLLOW. ct enterography report noted.  Remove ng tube abd start clears as per patients request.  Surgeon aware.

## 2017-08-20 NOTE — PROGRESS NOTE ADULT - SUBJECTIVE AND OBJECTIVE BOX
HPI:  65 y/o M w hx of DM, htn, presents w fever and abd pain x 1 week; reports the pain is in bilateral lower quadrant; denies vomiting; denies diarrhea; denies urinary symptoms; + cough; denies CP/SOB; denies lower ext swelling; no headache or neck stiffness (12 Aug 2017 15:51)  much better       Allergies    No Known Allergies    Intolerances        MEDICATIONS  (STANDING):  aspirin enteric coated 81 milliGRAM(s) Oral daily  ATENolol  Tablet 50 milliGRAM(s) Oral daily  insulin lispro (HumaLOG) corrective regimen sliding scale   SubCutaneous three times a day before meals  dextrose 5%. 1000 milliLiter(s) (50 mL/Hr) IV Continuous <Continuous>  dextrose 50% Injectable 12.5 Gram(s) IV Push once  dextrose 50% Injectable 25 Gram(s) IV Push once  dextrose 50% Injectable 25 Gram(s) IV Push once  lidocaine   Patch 1 Patch Transdermal daily  buPROPion XL . 300 milliGRAM(s) Oral daily  meropenem IVPB 500 milliGRAM(s) IV Intermittent every 8 hours  rivaroxaban 20 milliGRAM(s) Oral <User Schedule>  dextrose 5% + sodium chloride 0.45% with potassium chloride 20 mEq/L 1000 milliLiter(s) (125 mL/Hr) IV Continuous <Continuous>  pantoprazole  Injectable 40 milliGRAM(s) IV Push daily    MEDICATIONS  (PRN):  dextrose Gel 1 Dose(s) Oral once PRN Blood Glucose LESS THAN 70 milliGRAM(s)/deciliter  glucagon  Injectable 1 milliGRAM(s) IntraMuscular once PRN Glucose LESS THAN 70 milligrams/deciliter  oxyCODONE    5 mG/acetaminophen 325 mG 1 Tablet(s) Oral every 6 hours PRN Moderate Pain (4 - 6)  acetaminophen   Tablet 650 milliGRAM(s) Oral every 6 hours PRN For Temp greater than 38 C (100.4 F)  diphenhydrAMINE   Capsule 25 milliGRAM(s) Oral once PRN Insomnia  ondansetron Injectable 4 milliGRAM(s) IV Push every 6 hours PRN Nausea and/or Vomiting  benzocaine 15 mG/menthol 3.6 mG Lozenge 1 Lozenge Oral every 6 hours PRN Sore Throat      REVIEW OF SYSTEMS:    CONSTITUTIONAL: No fever, chills, weight loss, or fatigue  HEENT: No sore throat, runny nose, ear ache  RESPIRATORY: No cough, wheezing, No shortness of breath  CARDIOVASCULAR: No chest pain, palpitations, dizziness  GASTROINTESTINAL: remains with ngt awaiting sbo to resolve   GENITOURINARY: No dysuria, increase frequency, hematuria, or incontinence  NEUROLOGICAL: No headaches, memory loss, loss of strength, numbness, or tremors, no weakness  EXTREMITY: still with left side weakness ; but better   SKIN: No itching, burning, rashes, or lesions     VITAL SIGNS:  T(C): 36.9 (08-20-17 @ 11:16), Max: 37.1 (08-19-17 @ 17:10)  T(F): 98.4 (08-20-17 @ 11:16), Max: 98.8 (08-19-17 @ 17:10)  HR: 55 (08-20-17 @ 11:16) (55 - 64)  BP: 171/92 (08-20-17 @ 11:16) (151/94 - 171/92)  RR: 17 (08-20-17 @ 11:16) (16 - 17)  SpO2: 98% (08-20-17 @ 11:16) (95% - 98%)  Wt(kg): --    PHYSICAL EXAM:  ngt biliary output   flatus plus   GENERAL: not in any distress  HEENT: Neck is supple, normocephalic, atraumatic   CHEST/LUNG: Clear bilaterally; No rales, rhonchi, wheezing  HEART: Regular rate and rhythm; No murmurs, rubs, or gallops  ABDOMEN: Soft, Nontender, Nondistended; Bowel sounds decreased   EXTREMITIES:  2+ Peripheral Pulses, No clubbing, cyanosis, or edema  not able to get more than 1-2 /5 both arm and leg on left side   GENITOURINARY: NEGATIVE   SKIN: No rashes or lesions  BACK: no pressor sore   NERVOUS SYSTEM:  Alert & Oriented X3, Good concentration  PSYCH: normal affect     LABS:                         14.0   9.8   )-----------( 378      ( 20 Aug 2017 07:49 )             40.6     08-20    145  |  108  |  21  ----------------------------<  170<H>  3.8   |  27  |  0.64    Ca    8.1<L>      20 Aug 2017 07:49  Phos  1.9     08-20  Mg     2.8     08-20    TPro  6.6  /  Alb  2.5<L>  /  TBili  0.5  /  DBili  x   /  AST  20  /  ALT  43  /  AlkPhos  52  08-20    LIVER FUNCTIONS - ( 20 Aug 2017 07:49 )  Alb: 2.5 g/dL / Pro: 6.6 gm/dL / ALK PHOS: 52 U/L / ALT: 43 U/L / AST: 20 U/L / GGT: x                                                 Radiology:

## 2017-08-20 NOTE — PHARMACY COMMUNICATION NOTE - COMMENTS
verified with ismael tee 8/20/17 1030am, two doses will be given within next 4 hours, dosing interval every 1 hour entered into computer as per ismael tee

## 2017-08-20 NOTE — PROGRESS NOTE ADULT - ASSESSMENT
fever resolved   acute neuro abnormalities ?? from   now sbo   left sided weakness ;   neuro follow up ;; discussed with dr hernandez again today   he will follow up   all culture NEGATIVE so far  follow ct abdominal

## 2017-08-20 NOTE — PROGRESS NOTE ADULT - SUBJECTIVE AND OBJECTIVE BOX
Patient seen and examined at bedside in no distress. Wife bedside.  Patient drinking PO contrast. States that he is hungry and would like to "eat real food".   Reports complete improvement of abdominal pain.  + flatus.  Denies fever, chills, chest pain, sob, nausea, vomiting.     Vital Signs Last 24 Hrs  T(F): 97 (08-20-17 @ 05:40), Max: 98.8 (08-19-17 @ 17:10)  HR: 59 (08-20-17 @ 05:40)  BP: 151/94 (08-20-17 @ 05:40)  RR: 16 (08-20-17 @ 05:40)  SpO2: 97% (08-20-17 @ 05:40)    GENERAL: Alert, oriented, NAD  HEENT: NGT in place with ~100cc bilious output in bedside canister, suction currently d/cd while patient drinking contrast   CHEST/LUNG: Clear to auscultation bilaterally, respirations nonlabored  HEART: S1S2, Regular rate and rhythm  ABDOMEN: + Bowel sounds, soft, nondistended, nontender  EXTREMITIES:  no calf tenderness, no pedal edema b/l     LABS:                        14.0   9.8   )-----------( 378      ( 20 Aug 2017 07:49 )             40.6     08-20    145  |  108  |  21  ----------------------------<  170<H>  3.8   |  27  |  0.64    Ca    8.1<L>      20 Aug 2017 07:49  Phos  1.9     08-20  Mg     2.8     08-20    TPro  6.6  /  Alb  2.5<L>  /  TBili  0.5  /  DBili  x   /  AST  20  /  ALT  43  /  AlkPhos  52  08-20    Impression: 66 year old male PMH DM, HTN admitted with fever, new AF, left sided weakness, now with PSBO vs. ileus   Plan:  - For CT enterography today, will f/u results  - NGT and NPO for now, pending results of CT scan  - IV hydration  - IV protonix  - pain management PRN, incentive spirometer, DVT ppx   - continue all medical management and supportive care  - will d/w Dr Hernández

## 2017-08-21 PROCEDURE — 74020: CPT | Mod: 26

## 2017-08-21 RX ORDER — MAGNESIUM SULFATE 500 MG/ML
1 VIAL (ML) INJECTION ONCE
Qty: 0 | Refills: 0 | Status: DISCONTINUED | OUTPATIENT
Start: 2017-08-21 | End: 2017-08-21

## 2017-08-21 RX ORDER — PANTOPRAZOLE SODIUM 20 MG/1
40 TABLET, DELAYED RELEASE ORAL
Qty: 0 | Refills: 0 | Status: DISCONTINUED | OUTPATIENT
Start: 2017-08-21 | End: 2017-08-28

## 2017-08-21 RX ORDER — CALCIUM GLUCONATE 100 MG/ML
1 VIAL (ML) INTRAVENOUS ONCE
Qty: 1 | Refills: 0 | Status: DISCONTINUED | OUTPATIENT
Start: 2017-08-21 | End: 2017-08-21

## 2017-08-21 RX ADMIN — Medication 1: at 16:31

## 2017-08-21 RX ADMIN — LOSARTAN POTASSIUM 100 MILLIGRAM(S): 100 TABLET, FILM COATED ORAL at 05:25

## 2017-08-21 RX ADMIN — MEROPENEM 200 MILLIGRAM(S): 1 INJECTION INTRAVENOUS at 05:25

## 2017-08-21 RX ADMIN — RIVAROXABAN 20 MILLIGRAM(S): KIT at 12:38

## 2017-08-21 RX ADMIN — LIDOCAINE 1 PATCH: 4 CREAM TOPICAL at 00:30

## 2017-08-21 RX ADMIN — Medication 1: at 12:39

## 2017-08-21 RX ADMIN — LIDOCAINE 1 PATCH: 4 CREAM TOPICAL at 12:38

## 2017-08-21 RX ADMIN — MEROPENEM 200 MILLIGRAM(S): 1 INJECTION INTRAVENOUS at 14:48

## 2017-08-21 RX ADMIN — Medication 81 MILLIGRAM(S): at 12:38

## 2017-08-21 RX ADMIN — BUPROPION HYDROCHLORIDE 300 MILLIGRAM(S): 150 TABLET, EXTENDED RELEASE ORAL at 12:38

## 2017-08-21 RX ADMIN — Medication 2: at 08:21

## 2017-08-21 RX ADMIN — ATENOLOL 50 MILLIGRAM(S): 25 TABLET ORAL at 05:25

## 2017-08-21 RX ADMIN — MEROPENEM 200 MILLIGRAM(S): 1 INJECTION INTRAVENOUS at 21:46

## 2017-08-21 NOTE — PROGRESS NOTE ADULT - SUBJECTIVE AND OBJECTIVE BOX
66y old  Male who presented with a chief complaint of fever, abdominal pain and was admitted with SEPSIS, ATRIAL FIBRILLATION  ABDOMEN PAIN      Patient seen and examined at bedside with no complaints. Admits to flatus. Denies BM. Denies pain,nasuea/vomiting s/p NGT removal yesterday and  taking sips of clears yesterday. No events overnight. Has not been OOB. States he is "too weak" to ambulate. Used incentive spirometer.       Vital Signs Last 24 Hrs  T(F): 98.8 (08-21-17 @ 05:28), Max: 99 (08-21-17 @ 00:25)  HR: 66 (08-21-17 @ 05:28)  BP: 152/91 (08-21-17 @ 05:28)  RR: 17 (08-21-17 @ 05:28)  SpO2: 96% (08-21-17 @ 05:28)  Wt(kg): --   CAPILLARY BLOOD GLUCOSE  207 (21 Aug 2017 08:18)          GENERAL: Alert, NAD  CHEST/LUNG: Clear to auscultation bilaterally, respirations nonlabored  HEART: S1S2, Regular rate and rhythm;   ABDOMEN: + Bowel sounds, soft, Nontender to mild palpation X4, Nondistended  EXTREMITIES:  no calf tenderness, No edema, IPC in place     I&O's Detail    20 Aug 2017 07:01  -  21 Aug 2017 07:00  --------------------------------------------------------  IN:    dextrose 5% + sodium chloride 0.45% with potassium chloride 20 mEq/L: 1500 mL    Solution: 200 mL  Total IN: 1700 mL    OUT:  Total OUT: 0 mL    Total NET: 1700 mL          LABS:                        14.0   9.8   )-----------( 378      ( 20 Aug 2017 07:49 )             40.6     08-20    145  |  108  |  21  ----------------------------<  170<H>  3.8   |  27  |  0.64    Ca    8.1<L>      20 Aug 2017 07:49  Phos  1.9     08-20  Mg     2.8     08-20    TPro  6.6  /  Alb  2.5<L>  /  TBili  0.5  /  DBili  x   /  AST  20  /  ALT  43  /  AlkPhos  52  08-20        RADIOLOGY & ADDITIONAL STUDIES:     66y old  Male s/p secondary to SEPSIS, ATRIAL FIBRILLATION  ABDOMEN PAIN  , POD# with PMH Diabetes  HTN (hypertension)      IV abx-Merrem  Advance to fulls for lunch-->AAT  Incentive spirometer  Xarelto, ASA for Afib  BP meds  GI prophylaxis with PO PPI  Decrease IVF to 50cc/hr  Replete electrolytes  Continue Physical therapy  - will discuss with surgical attending  Check labs in am 66y old  Male who presented with a chief complaint of fever, abdominal pain and was admitted with SEPSIS, ATRIAL FIBRILLATION  ABDOMEN PAIN      Patient seen and examined at bedside with no complaints. Admits to flatus. Denies BM. Denies pain,nasuea/vomiting s/p NGT removal yesterday and  taking sips of clears yesterday. No events overnight. Has not been OOB. States he is "too weak" to ambulate. Used incentive spirometer.       Vital Signs Last 24 Hrs  T(F): 98.8 (08-21-17 @ 05:28), Max: 99 (08-21-17 @ 00:25)  HR: 66 (08-21-17 @ 05:28)  BP: 152/91 (08-21-17 @ 05:28)  RR: 17 (08-21-17 @ 05:28)  SpO2: 96% (08-21-17 @ 05:28)  Wt(kg): --   CAPILLARY BLOOD GLUCOSE  207 (21 Aug 2017 08:18)          GENERAL: Alert, NAD  CHEST/LUNG: Clear to auscultation bilaterally, respirations nonlabored  HEART: S1S2, Regular rate and rhythm;   ABDOMEN: + Bowel sounds, soft, Nontender to mild palpation X4, Nondistended  EXTREMITIES:  no calf tenderness, No edema, IPC in place     I&O's Detail    20 Aug 2017 07:01  -  21 Aug 2017 07:00  --------------------------------------------------------  IN:    dextrose 5% + sodium chloride 0.45% with potassium chloride 20 mEq/L: 1500 mL    Solution: 200 mL  Total IN: 1700 mL    OUT:  Total OUT: 0 mL    Total NET: 1700 mL          LABS:                        14.0   9.8   )-----------( 378      ( 20 Aug 2017 07:49 )             40.6     08-20    145  |  108  |  21  ----------------------------<  170<H>  3.8   |  27  |  0.64    Ca    8.1<L>      20 Aug 2017 07:49  Phos  1.9     08-20  Mg     2.8     08-20    TPro  6.6  /  Alb  2.5<L>  /  TBili  0.5  /  DBili  x   /  AST  20  /  ALT  43  /  AlkPhos  52  08-20        RADIOLOGY & ADDITIONAL STUDIES:     66y old  Male s/p secondary to SEPSIS, ATRIAL FIBRILLATION  ABDOMEN PAIN  , POD# with PMH Diabetes  HTN (hypertension)      IV abx-Merrem  Advance to fulls for lunch-->AAT  Incentive spirometer  Xarelto, ASA for Afib  BP meds  GI prophylaxis with PO PPI  Decrease IVF to 50cc/hr  Replete electrolytes  F/U pending AXR  Continue Physical therapy  - will discuss with surgical attending  Check labs in am 66y old  Male who presented with a chief complaint of fever, abdominal pain and was admitted with SEPSIS, ATRIAL FIBRILLATION  ABDOMEN PAIN      Patient seen and examined at bedside with no complaints. Admits to flatus. Denies BM. Denies pain,nasuea/vomiting s/p NGT removal yesterday and  taking sips of clears yesterday. No events overnight. Has not been OOB. States he is "too weak" to ambulate. Used incentive spirometer.       Vital Signs Last 24 Hrs  T(F): 98.8 (08-21-17 @ 05:28), Max: 99 (08-21-17 @ 00:25)  HR: 66 (08-21-17 @ 05:28)  BP: 152/91 (08-21-17 @ 05:28)  RR: 17 (08-21-17 @ 05:28)  SpO2: 96% (08-21-17 @ 05:28)  Wt(kg): --   CAPILLARY BLOOD GLUCOSE  207 (21 Aug 2017 08:18)          GENERAL: Alert, NAD  CHEST/LUNG: Clear to auscultation bilaterally, respirations nonlabored  HEART: S1S2, irreg irreg   ABDOMEN: + Bowel sounds, soft, Nontender to mild palpation X4, Nondistended  EXTREMITIES:  no calf tenderness, No edema, IPC in place     I&O's Detail    20 Aug 2017 07:01  -  21 Aug 2017 07:00  --------------------------------------------------------  IN:    dextrose 5% + sodium chloride 0.45% with potassium chloride 20 mEq/L: 1500 mL    Solution: 200 mL  Total IN: 1700 mL    OUT:  Total OUT: 0 mL    Total NET: 1700 mL          LABS:                        14.0   9.8   )-----------( 378      ( 20 Aug 2017 07:49 )             40.6     08-20    145  |  108  |  21  ----------------------------<  170<H>  3.8   |  27  |  0.64    Ca    8.1<L>      20 Aug 2017 07:49  Phos  1.9     08-20  Mg     2.8     08-20    TPro  6.6  /  Alb  2.5<L>  /  TBili  0.5  /  DBili  x   /  AST  20  /  ALT  43  /  AlkPhos  52  08-20        RADIOLOGY & ADDITIONAL STUDIES:     66y old  Male s/p secondary to SEPSIS, ATRIAL FIBRILLATION  ABDOMEN PAIN  , POD# with PMH Diabetes  HTN (hypertension)      IV abx-Merrem  Advance to fulls? ADAT?  Incentive spirometer  Xarelto, ASA for Afib  BP meds  GI prophylaxis with PO PPI  Decrease IVF to 50cc/hr  Replete electrolytes  F/U pending AXR  Continue Physical therapy  - will discuss with surgical attending  Check labs in am    Addendum:    Above d/w Dr. Hernández. As per , continue clear liquids  today and re-eval in am for possible diet advancement.    AXR 8/21/17: Decreased SBO.     Pt informed will continue clear liquid diet today.     DICKSON Vila  12:11PM

## 2017-08-21 NOTE — PROGRESS NOTE ADULT - ASSESSMENT
66 year old man with history of hypertension and diabetes mellitus seen in the emergency with and being admitted for fever of one weeks duration and new onset atrial fibrillation and abdominal pain.  Cat scan iof abdomen only showed a non obstructing left intrarenal stone.    8/20/17.  Report of CT enterography noted.  Discussed with surgeon.  Wants the NG TUBE in place but patient wants NG tube removed.

## 2017-08-21 NOTE — PROGRESS NOTE ADULT - SUBJECTIVE AND OBJECTIVE BOX
Subjective Complaints:  Historian:     Patient is complaining of left sided weakness    REVIEW OF SYSTEMS:  Eyes:  Good vision, no reported pain  ENT:  No sore throat, pain, runny nose, dysphagia  CV:  No pain, palpitatioins, hypo/hypertension  Resp:  No dyspnea, cough, tachypnea, wheezing  GI:  No pain, nausea, vomiting, diarrhea, constipatiion  Muscle:  No pain, weakness of left upper and lower extremities  Neuro:  Left upper and lower extremities weakness, tingling, memory problems  Psych:  No fatigue, insomnia, mood problems, depression  Endocrine:  No polyuria, polydypsia, cold/heat intolerance    Vital Signs Last 24 Hrs  T(C): 37.1 (21 Aug 2017 05:28), Max: 37.2 (21 Aug 2017 00:25)  T(F): 98.8 (21 Aug 2017 05:28), Max: 99 (21 Aug 2017 00:25)  HR: 66 (21 Aug 2017 05:28) (63 - 66)  BP: 152/91 (21 Aug 2017 05:28) (150/88 - 166/94)  BP(mean): --  RR: 17 (21 Aug 2017 05:28) (17 - 18)  SpO2: 96% (21 Aug 2017 05:28) (94% - 96%)    GENERAL PHYSICAL EXAM:  General:  Appears stated age, well-groomed, well-nourished, no distress  HEENT:  NC/AT, patent nares w/ pink mucosa, OP clear w/o lesions, PERRL, EOMI, conjunctivae clear, no thyromegaly, nodules, adenopathy, no JVD  Chest:  Full & symmetric excursion, no increased effort, breath sounds clear  Cardiovascular:  Regular rhythm, S1, S2, no murmur/rub/S3/S4, no carotid/femoral/abdominal bruit, radial/pedal pulses 2+, no edema  Abdomen:  Soft, non-tender, non-distended, normoactive bowel sounds, no HSM  Extremities:  Gait & station:   Digits:   Nails:   Joints, Bones, Muscles:   ROM:   Stability:  Skin:  No rash/erythema/ecchymoses/petechiae/wounds/abscess/warm/dry  Musculoskeletal:  Full ROM in all joints w/o swelling/tenderness/effusion    NEUROLOGICAL EXAM:  HENT:  Normocephalic head; atraumatic head.  Neck supple.  ENT: normal looking.  Mental State:    Alert.  Fully oriented to person, place and date.  Coherent.  Speech clear and intact.  Cooperative.  Responds appropriately.    Cranial Nerves:  II-XII:   Pupils round and reactive to light and accommodation.  Extraocular movements full.  Visual fields full (no homonymous hemianopsia).  Visual acuity wnl.  Facial symmetry intact.  Tongue midline.  Motor Functions:  left upper and lower extremity weakness   Sensory Functions:   Intact to touch and pinprick to face and extremities.    Reflexes:  Deep tendon reflexes normoactive to biceps, knees and ankles.  Babinski absent (present).  Cerebellar Testing:    Finger to nose intact.  Nystagmus absent.  Neurovascular: Carotid auscultation full without bruits.      LABS:                        14.0   9.8   )-----------( 378      ( 20 Aug 2017 07:49 )             40.6     08-20    145  |  108  |  21  ----------------------------<  170<H>  3.8   |  27  |  0.64    Ca    8.1<L>      20 Aug 2017 07:49  Phos  1.9     08-20  Mg     2.8     08-20    TPro  6.6  /  Alb  2.5<L>  /  TBili  0.5  /  DBili  x   /  AST  20  /  ALT  43  /  AlkPhos  52  08-20            RADIOLOGY & ADDITIONAL STUDIES:    losartan: [Ordered as COZAAR]  100 milliGRAM(s), Oral, daily  Provider's Contact #: [phone] (08-20 @ 15:44)  Xray Abdomen Multiple Views: AM   Indication: partial SBO  Transport: [facility]  Addl Info: flat and upright  Exam Completed  Provider's Contact #: [phone] (08-20 @ 17:06)  Provider to RN:       remove NG tube and start clears (08-20 @ 19:10)  Diet, Clear Liquid (08-20 @ 19:10)  Diet, Full Liquid (08-20 @ 19:10)  Diet, Regular (08-20 @ 19:10)  Comprehensive Metabolic Panel: AM Sched. Collection: 21-Aug-2017 05:00  Cancel Reason: Patient Refused (08-20 @ 19:31)  Magnesium, Serum: AM Sched. Collection: 21-Aug-2017 05:00  Cancel Reason: Patient Refused (08-20 @ 19:31)  Phosphorus Level, Serum: AM Sched. Collection: 21-Aug-2017 05:00  Cancel Reason: Patient Refused (08-20 @ 19:31)  Diet, Clear Liquid (08-20 @ 23:23)  Complete Blood Count: AM Sched. Collection: 21-Aug-2017 05:00  Cancel Reason: Patient Refused (08-21 @ 00:01)  pantoprazole    Tablet: [Ordered as PROTONIX   DR]  40 milliGRAM(s), Oral, before breakfast  Administration Instructions: swallow whole * don't crush/chew (08-21 @ 08:50)  magnesium sulfate  IVPB:   1 Gram(s) in dextrose 5% 100 milliLiter(s), IV Intermittent, once, infuse over 60 Minute(s), Stop After 1 Doses (08-21 @ 08:57)  calcium gluconate IVPB:   1 Gram(s) in dextrose 5% 100 milliLiter(s), IV Intermittent, once, infuse over 30 Minute(s), Stop After 1 Doses  Special Instructions: 1 Gram = 4.65 milliEquivalents elemental calcium (08-21 @ 09:00)  Diet, Full Liquid (08-21 @ 10:02)  CT Head No Cont: Routine   Indication: left hemiparesis  Transport: [facility]  Provider's Contact #: [phone] (08-21 @ 11:00)  Diet, Clear Liquid (08-21 @ 11:18)  RIGHT HEMISPHER INFARCT CAN NOT BE R/O    Recommendations:  HEAD CT WITHOUT CONTRAT WITH THIN SLICES

## 2017-08-21 NOTE — PROGRESS NOTE ADULT - ASSESSMENT
fever resolved   acute neuro abnormalities ?? from   now sbo   left sided weakness ;   neuro follow up ;; discussed with dr hernandez again today   he will follow up   all culture NEGATIVE so far  follow ct abdominal fever resolved   acute neuro abnormalities ?? from   now sbo   left sided weakness ;   neuro follow up ;; discussed with dr hernandez again today   he will follow up   all culture NEGATIVE so far  neuro follow up

## 2017-08-21 NOTE — PROGRESS NOTE ADULT - PROBLEM SELECTOR PLAN 8
NG TUBE IN PLACE.  CONTINUE CONSERVATIVE MANAGEMENT.  SURGERY TO FOLLOW. ct enterography report noted.  Remove ng tube abd start clears as per patients request.  Surgeon aware.

## 2017-08-21 NOTE — PROGRESS NOTE ADULT - SUBJECTIVE AND OBJECTIVE BOX
Patient is a 66y old  Male who presents with a chief complaint of fever, abdominal pain (12 Aug 2017 15:51)        PAST MEDICAL & SURGICAL HISTORY:  Diabetes  HTN (hypertension)      Summary of admission HPI: CVA                PREVIOUS DIAGNOSTIC TESTING:      ECHO  FINDINGS:    STRESS  FINDINGS:    CATHETERIZATION  FINDINGS:    ELECTROPHYSIOLOGY STUDY  FINDINGS:    CAROTID ULTRASOUND:  FINDINGS    VENOUS DUPLEX SCAN:  FINDINGS:    CHEST CT PULMONARY ANGIO with IV Contrast:  FINDINGS:  MEDICATIONS  (STANDING):  aspirin enteric coated 81 milliGRAM(s) Oral daily  ATENolol  Tablet 50 milliGRAM(s) Oral daily  insulin lispro (HumaLOG) corrective regimen sliding scale   SubCutaneous three times a day before meals  dextrose 5%. 1000 milliLiter(s) (50 mL/Hr) IV Continuous <Continuous>  dextrose 50% Injectable 12.5 Gram(s) IV Push once  dextrose 50% Injectable 25 Gram(s) IV Push once  dextrose 50% Injectable 25 Gram(s) IV Push once  lidocaine   Patch 1 Patch Transdermal daily  buPROPion XL . 300 milliGRAM(s) Oral daily  meropenem IVPB 500 milliGRAM(s) IV Intermittent every 8 hours  rivaroxaban 20 milliGRAM(s) Oral <User Schedule>  dextrose 5% + sodium chloride 0.45% with potassium chloride 20 mEq/L 1000 milliLiter(s) (50 mL/Hr) IV Continuous <Continuous>  losartan 100 milliGRAM(s) Oral daily  pantoprazole    Tablet 40 milliGRAM(s) Oral before breakfast    MEDICATIONS  (PRN):  dextrose Gel 1 Dose(s) Oral once PRN Blood Glucose LESS THAN 70 milliGRAM(s)/deciliter  glucagon  Injectable 1 milliGRAM(s) IntraMuscular once PRN Glucose LESS THAN 70 milligrams/deciliter  acetaminophen   Tablet 650 milliGRAM(s) Oral every 6 hours PRN For Temp greater than 38 C (100.4 F)  diphenhydrAMINE   Capsule 25 milliGRAM(s) Oral once PRN Insomnia  ondansetron Injectable 4 milliGRAM(s) IV Push every 6 hours PRN Nausea and/or Vomiting  benzocaine 15 mG/menthol 3.6 mG Lozenge 1 Lozenge Oral every 6 hours PRN Sore Throat      FAMILY HISTORY:      SOCIAL HISTORY:    CIGARETTES:    ALCOHOL:    REVIEW OF SYSTEMS:    CONSTITUTIONAL: No fever, weight loss, chills, shakes, or fatigue  EYES: No eye pain, visual disturbances, or discharge  ENMT:  No difficulty hearing, tinnitus, vertigo; No sinus or throat pain  NECK: No pain or stiffness  BREASTS: No pain, masses, or nipple discharge  RESPIRATORY: No cough, wheezing, hemoptysis, or shortness of breath  CARDIOVASCULAR: No chest pain, dyspnea, palpitations, dizziness, syncope, paroxysmal nocturnal dyspnea, orthopnea, or arm or leg swelling  GASTROINTESTINAL: No abdominal  or epigastric pain, nausea, vomiting, hematemesis, diarrhea, constipation, melena or bright red blood.  GENITOURINARY: No dysuria, nocturia, hematuria, or urinary incontinence  NEUROLOGICAL: No headaches, memory loss, slurred speech, limb weakness, loss of strength, numbness, or tremors  SKIN: No itching, burning, rashes, or lesions   LYMPH NODES: No enlarged glands  ENDOCRINE: No heat or cold intolerance, or hair loss  MUSCULOSKELETAL: No joint pain or swelling, muscle, back, or extremity pain  PSYCHIATRIC: No depression, anxiety, or difficulty sleeping  HEME/LYMPH: No easy bruising or bleeding gums  ALLERY AND IMMUNOLOGIC: No hives or rash.      Vital Signs Last 24 Hrs  T(C): 37.1 (21 Aug 2017 05:28), Max: 37.2 (21 Aug 2017 00:25)  T(F): 98.8 (21 Aug 2017 05:28), Max: 99 (21 Aug 2017 00:25)  HR: 66 (21 Aug 2017 05:28) (55 - 66)  BP: 152/91 (21 Aug 2017 05:28) (150/88 - 171/92)  BP(mean): --  RR: 17 (21 Aug 2017 05:28) (17 - 18)  SpO2: 96% (21 Aug 2017 05:28) (94% - 98%)    PHYSICAL EXAM:    GENERAL: In no apparent distress, well nourished, and hydrated.  HEAD:  Atraumatic, Normocephalic  EYES: EOMI, PERRLA, conjunctiva and sclera clear  ENMT: No tonsillar erythema, exudates, or enlargement; Moist mucous membranes, Good dentition, No lesions  NECK: Supple and normal thyroid.  No JVD or carotid bruit.  Carotid pulse is 2+ bilaterally.  HEART: Regular rate and rhythm; No murmurs, rubs, or gallops.  PULMONARY: Clear to auscultation and perfusion.  No rales, wheezing, or rhonchi bilaterally.  ABDOMEN: Soft, Nontender, Nondistended; Bowel sounds present  EXTREMITIES:  2+ Peripheral Pulses, No clubbing, cyanosis, or edema  LYMPH: No lymphadenopathy noted  NEUROLOGICAL: Grossly nonfocal          INTERPRETATION OF TELEMETRY:    ECG:    I&O's Detail    20 Aug 2017 07:01  -  21 Aug 2017 07:00  --------------------------------------------------------  IN:    dextrose 5% + sodium chloride 0.45% with potassium chloride 20 mEq/L: 1500 mL    Solution: 200 mL  Total IN: 1700 mL    OUT:  Total OUT: 0 mL    Total NET: 1700 mL          LABS:                        14.0   9.8   )-----------( 378      ( 20 Aug 2017 07:49 )             40.6     08-20    145  |  108  |  21  ----------------------------<  170<H>  3.8   |  27  |  0.64    Ca    8.1<L>      20 Aug 2017 07:49  Phos  1.9     08-20  Mg     2.8     08-20    TPro  6.6  /  Alb  2.5<L>  /  TBili  0.5  /  DBili  x   /  AST  20  /  ALT  43  /  AlkPhos  52  08-20            BNP  I&O's Detail    20 Aug 2017 07:01  -  21 Aug 2017 07:00  --------------------------------------------------------  IN:    dextrose 5% + sodium chloride 0.45% with potassium chloride 20 mEq/L: 1500 mL    Solution: 200 mL  Total IN: 1700 mL    OUT:  Total OUT: 0 mL    Total NET: 1700 mL        Daily     Daily     RADIOLOGY & ADDITIONAL STUDIES:

## 2017-08-21 NOTE — PROGRESS NOTE ADULT - SUBJECTIVE AND OBJECTIVE BOX
HPI:  65 y/o M w hx of DM, htn, presents w fever and abd pain x 1 week; reports the pain is in bilateral lower quadrant; denies vomiting; denies diarrhea; denies urinary symptoms; + cough; denies CP/SOB; denies lower ext swelling; no headache or neck stiffness (12 Aug 2017 15:51)      Allergies    No Known Allergies    Intolerances        MEDICATIONS  (STANDING):  aspirin enteric coated 81 milliGRAM(s) Oral daily  ATENolol  Tablet 50 milliGRAM(s) Oral daily  insulin lispro (HumaLOG) corrective regimen sliding scale   SubCutaneous three times a day before meals  dextrose 5%. 1000 milliLiter(s) (50 mL/Hr) IV Continuous <Continuous>  dextrose 50% Injectable 12.5 Gram(s) IV Push once  dextrose 50% Injectable 25 Gram(s) IV Push once  dextrose 50% Injectable 25 Gram(s) IV Push once  lidocaine   Patch 1 Patch Transdermal daily  buPROPion XL . 300 milliGRAM(s) Oral daily  meropenem IVPB 500 milliGRAM(s) IV Intermittent every 8 hours  rivaroxaban 20 milliGRAM(s) Oral <User Schedule>  dextrose 5% + sodium chloride 0.45% with potassium chloride 20 mEq/L 1000 milliLiter(s) (50 mL/Hr) IV Continuous <Continuous>  losartan 100 milliGRAM(s) Oral daily  pantoprazole    Tablet 40 milliGRAM(s) Oral before breakfast    MEDICATIONS  (PRN):  dextrose Gel 1 Dose(s) Oral once PRN Blood Glucose LESS THAN 70 milliGRAM(s)/deciliter  glucagon  Injectable 1 milliGRAM(s) IntraMuscular once PRN Glucose LESS THAN 70 milligrams/deciliter  acetaminophen   Tablet 650 milliGRAM(s) Oral every 6 hours PRN For Temp greater than 38 C (100.4 F)  diphenhydrAMINE   Capsule 25 milliGRAM(s) Oral once PRN Insomnia  ondansetron Injectable 4 milliGRAM(s) IV Push every 6 hours PRN Nausea and/or Vomiting  benzocaine 15 mG/menthol 3.6 mG Lozenge 1 Lozenge Oral every 6 hours PRN Sore Throat      REVIEW OF SYSTEMS:    CONSTITUTIONAL: No fever, chills, weight loss, or fatigue  HEENT: No sore throat, runny nose, ear ache  RESPIRATORY: No cough, wheezing, No shortness of breath  CARDIOVASCULAR: No chest pain, palpitations, dizziness  GASTROINTESTINAL: No abdominal pain. No nausea, vomiting, diarrhea  GENITOURINARY: No dysuria, increase frequency, hematuria, or incontinence  NEUROLOGICAL: No headaches, memory loss, loss of strength, numbness, or tremors, no weakness  EXTREMITY: No pedal edema BLE  SKIN: No itching, burning, rashes, or lesions     VITAL SIGNS:  T(C): 37.7 (08-21-17 @ 16:57), Max: 37.7 (08-21-17 @ 16:57)  T(F): 99.8 (08-21-17 @ 16:57), Max: 99.8 (08-21-17 @ 16:57)  HR: 65 (08-21-17 @ 16:57) (63 - 66)  BP: 162/96 (08-21-17 @ 16:57) (150/88 - 162/96)  RR: 18 (08-21-17 @ 16:57) (17 - 18)  SpO2: 97% (08-21-17 @ 16:57) (96% - 97%)  Wt(kg): --    PHYSICAL EXAM:    GENERAL: not in any distress  HEENT: Neck is supple, normocephalic, atraumatic   CHEST/LUNG: Clear to percussion bilaterally; No rales, rhonchi, wheezing  HEART: Regular rate and rhythm; No murmurs, rubs, or gallops  ABDOMEN: Soft, Nontender, Nondistended; Bowel sounds present, no rebound   EXTREMITIES:  2+ Peripheral Pulses, No clubbing, cyanosis, or edema  GENITOURINARY:   SKIN: No rashes or lesions  BACK: no pressor sore   NERVOUS SYSTEM:  Alert & Oriented X3, Good concentration  PSYCH: normal affect     LABS:                         14.0   9.8   )-----------( 378      ( 20 Aug 2017 07:49 )             40.6     08-20    145  |  108  |  21  ----------------------------<  170<H>  3.8   |  27  |  0.64    Ca    8.1<L>      20 Aug 2017 07:49  Phos  1.9     08-20  Mg     2.8     08-20    TPro  6.6  /  Alb  2.5<L>  /  TBili  0.5  /  DBili  x   /  AST  20  /  ALT  43  /  AlkPhos  52  08-20    LIVER FUNCTIONS - ( 20 Aug 2017 07:49 )  Alb: 2.5 g/dL / Pro: 6.6 gm/dL / ALK PHOS: 52 U/L / ALT: 43 U/L / AST: 20 U/L / GGT: x                                                 Radiology: HPI:  67 y/o M w hx of DM, htn, presents w fever and abd pain x 1 week; reports the pain is in bilateral lower quadrant; denies vomiting; denies diarrhea; denies urinary symptoms; + cough; denies CP/SOB; denies lower ext swelling; no headache or neck stiffness (12 Aug 2017 15:51)  had high fevers and Altered Mental Status on admission and then no fevers on empiric antibiotics   however concurrently developed left weakness which is still not resolved   all neuro studies are NEGATIVE so far   discussed with dr hernandez repeatedly   all events noted   now with sbo   Allergies    No Known Allergies    Intolerances        MEDICATIONS  (STANDING):  aspirin enteric coated 81 milliGRAM(s) Oral daily  ATENolol  Tablet 50 milliGRAM(s) Oral daily  insulin lispro (HumaLOG) corrective regimen sliding scale   SubCutaneous three times a day before meals  dextrose 5%. 1000 milliLiter(s) (50 mL/Hr) IV Continuous <Continuous>  dextrose 50% Injectable 12.5 Gram(s) IV Push once  dextrose 50% Injectable 25 Gram(s) IV Push once  dextrose 50% Injectable 25 Gram(s) IV Push once  lidocaine   Patch 1 Patch Transdermal daily  buPROPion XL . 300 milliGRAM(s) Oral daily  meropenem IVPB 500 milliGRAM(s) IV Intermittent every 8 hours  rivaroxaban 20 milliGRAM(s) Oral <User Schedule>  dextrose 5% + sodium chloride 0.45% with potassium chloride 20 mEq/L 1000 milliLiter(s) (50 mL/Hr) IV Continuous <Continuous>  losartan 100 milliGRAM(s) Oral daily  pantoprazole    Tablet 40 milliGRAM(s) Oral before breakfast    MEDICATIONS  (PRN):  dextrose Gel 1 Dose(s) Oral once PRN Blood Glucose LESS THAN 70 milliGRAM(s)/deciliter  glucagon  Injectable 1 milliGRAM(s) IntraMuscular once PRN Glucose LESS THAN 70 milligrams/deciliter  acetaminophen   Tablet 650 milliGRAM(s) Oral every 6 hours PRN For Temp greater than 38 C (100.4 F)  diphenhydrAMINE   Capsule 25 milliGRAM(s) Oral once PRN Insomnia  ondansetron Injectable 4 milliGRAM(s) IV Push every 6 hours PRN Nausea and/or Vomiting  benzocaine 15 mG/menthol 3.6 mG Lozenge 1 Lozenge Oral every 6 hours PRN Sore Throat      REVIEW OF SYSTEMS:    CONSTITUTIONAL: No fever, chills, weight loss, or fatigue  HEENT: No sore throat, runny nose, ear ache  RESPIRATORY: No cough, wheezing, No shortness of breath  CARDIOVASCULAR: No chest pain, palpitations, dizziness  GASTROINTESTINAL: No abdominal pain. 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   EXTREMITY: No pedal edema BLE  SKIN: No itching, burning, rashes, or lesions     VITAL SIGNS:  T(C): 37.7 (08-21-17 @ 16:57), Max: 37.7 (08-21-17 @ 16:57)  T(F): 99.8 (08-21-17 @ 16:57), Max: 99.8 (08-21-17 @ 16:57)  HR: 65 (08-21-17 @ 16:57) (63 - 66)  BP: 162/96 (08-21-17 @ 16:57) (150/88 - 162/96)  RR: 18 (08-21-17 @ 16:57) (17 - 18)  SpO2: 97% (08-21-17 @ 16:57) (96% - 97%)  Wt(kg): --    PHYSICAL EXAM:    GENERAL: not in any distress  HEENT: Neck is supple, normocephalic, atraumatic   CHEST/LUNG: Clear bilaterally; No rales, rhonchi, wheezing  HEART: Regular rate and rhythm; No murmurs, rubs, or gallops  ABDOMEN: Soft, Nontender, Nondistended; Bowel sounds present, no rebound   EXTREMITIES:  2+ Peripheral Pulses, No clubbing, cyanosis, or edema  GENITOURINARY: NEGATIVE   SKIN: No rashes or lesions  BACK: no pressor sore   NERVOUS SYSTEM:  Alert & Oriented X3, Good concentration  left arm and leg weakness   PSYCH: normal affect     LABS:                         14.0   9.8   )-----------( 378      ( 20 Aug 2017 07:49 )             40.6     08-20    145  |  108  |  21  ----------------------------<  170<H>  3.8   |  27  |  0.64    Ca    8.1<L>      20 Aug 2017 07:49  Phos  1.9     08-20  Mg     2.8     08-20    TPro  6.6  /  Alb  2.5<L>  /  TBili  0.5  /  DBili  x   /  AST  20  /  ALT  43  /  AlkPhos  52  08-20    LIVER FUNCTIONS - ( 20 Aug 2017 07:49 )  Alb: 2.5 g/dL / Pro: 6.6 gm/dL / ALK PHOS: 52 U/L / ALT: 43 U/L / AST: 20 U/L / GGT: x                                                 Radiology:    < from: CT Enterography w/ Oral Cont and w/ IV Cont (08.20.17 @ 13:53) >  IMPRESSION:     Stable bowel distention, partial obstruction versus ileus. Enteric   contrast passes into the colon. No evidence of obstructing mass. Mild   focal nonspecific small bowel wall thickening right abdomen. Other   incidental comments as above.          ***Please see the addendum at the top of this report. It may contain   additional important information or changes.****          GRACIELA MCNEILL M.D., ATTENDING RADIOLOGIST  This document has been electronically signed. Aug 20 2017  2:16PM    < end of copied text >

## 2017-08-22 DIAGNOSIS — E83.39 OTHER DISORDERS OF PHOSPHORUS METABOLISM: ICD-10-CM

## 2017-08-22 LAB
ALBUMIN SERPL ELPH-MCNC: 2.8 G/DL — LOW (ref 3.3–5)
ALP SERPL-CCNC: 87 U/L — SIGNIFICANT CHANGE UP (ref 40–120)
ALT FLD-CCNC: 123 U/L — HIGH (ref 12–78)
ANION GAP SERPL CALC-SCNC: 9 MMOL/L — SIGNIFICANT CHANGE UP (ref 5–17)
AST SERPL-CCNC: 91 U/L — HIGH (ref 15–37)
BILIRUB SERPL-MCNC: 1.5 MG/DL — HIGH (ref 0.2–1.2)
BUN SERPL-MCNC: 22 MG/DL — SIGNIFICANT CHANGE UP (ref 7–23)
CALCIUM SERPL-MCNC: 8.7 MG/DL — SIGNIFICANT CHANGE UP (ref 8.5–10.1)
CHLORIDE SERPL-SCNC: 105 MMOL/L — SIGNIFICANT CHANGE UP (ref 96–108)
CO2 SERPL-SCNC: 27 MMOL/L — SIGNIFICANT CHANGE UP (ref 22–31)
CREAT SERPL-MCNC: 0.83 MG/DL — SIGNIFICANT CHANGE UP (ref 0.5–1.3)
GLUCOSE SERPL-MCNC: 157 MG/DL — HIGH (ref 70–99)
HCT VFR BLD CALC: 44.1 % — SIGNIFICANT CHANGE UP (ref 39–50)
HGB BLD-MCNC: 15.5 G/DL — SIGNIFICANT CHANGE UP (ref 13–17)
MCHC RBC-ENTMCNC: 31.4 PG — SIGNIFICANT CHANGE UP (ref 27–34)
MCHC RBC-ENTMCNC: 35.1 GM/DL — SIGNIFICANT CHANGE UP (ref 32–36)
MCV RBC AUTO: 89.4 FL — SIGNIFICANT CHANGE UP (ref 80–100)
PLATELET # BLD AUTO: 438 K/UL — HIGH (ref 150–400)
POTASSIUM SERPL-MCNC: 4.5 MMOL/L — SIGNIFICANT CHANGE UP (ref 3.5–5.3)
POTASSIUM SERPL-SCNC: 4.5 MMOL/L — SIGNIFICANT CHANGE UP (ref 3.5–5.3)
PROT SERPL-MCNC: 7 GM/DL — SIGNIFICANT CHANGE UP (ref 6–8.3)
RBC # BLD: 4.93 M/UL — SIGNIFICANT CHANGE UP (ref 4.2–5.8)
RBC # FLD: 11.9 % — SIGNIFICANT CHANGE UP (ref 11–15)
SODIUM SERPL-SCNC: 141 MMOL/L — SIGNIFICANT CHANGE UP (ref 135–145)
WBC # BLD: 8.7 K/UL — SIGNIFICANT CHANGE UP (ref 3.8–10.5)
WBC # FLD AUTO: 8.7 K/UL — SIGNIFICANT CHANGE UP (ref 3.8–10.5)

## 2017-08-22 PROCEDURE — 74020: CPT | Mod: 26

## 2017-08-22 RX ORDER — SODIUM,POTASSIUM PHOSPHATES 278-250MG
1 POWDER IN PACKET (EA) ORAL
Qty: 0 | Refills: 0 | Status: DISCONTINUED | OUTPATIENT
Start: 2017-08-22 | End: 2017-08-22

## 2017-08-22 RX ORDER — SODIUM,POTASSIUM PHOSPHATES 278-250MG
1 POWDER IN PACKET (EA) ORAL
Qty: 0 | Refills: 0 | Status: COMPLETED | OUTPATIENT
Start: 2017-08-22 | End: 2017-08-24

## 2017-08-22 RX ORDER — SODIUM CHLORIDE 9 MG/ML
1000 INJECTION, SOLUTION INTRAVENOUS
Qty: 0 | Refills: 0 | Status: DISCONTINUED | OUTPATIENT
Start: 2017-08-22 | End: 2017-08-27

## 2017-08-22 RX ADMIN — LIDOCAINE 1 PATCH: 4 CREAM TOPICAL at 00:11

## 2017-08-22 RX ADMIN — DEXTROSE MONOHYDRATE, SODIUM CHLORIDE, AND POTASSIUM CHLORIDE 50 MILLILITER(S): 50; .745; 4.5 INJECTION, SOLUTION INTRAVENOUS at 06:01

## 2017-08-22 RX ADMIN — RIVAROXABAN 20 MILLIGRAM(S): KIT at 11:23

## 2017-08-22 RX ADMIN — Medication 1: at 07:42

## 2017-08-22 RX ADMIN — SODIUM CHLORIDE 50 MILLILITER(S): 9 INJECTION, SOLUTION INTRAVENOUS at 12:04

## 2017-08-22 RX ADMIN — MEROPENEM 200 MILLIGRAM(S): 1 INJECTION INTRAVENOUS at 21:24

## 2017-08-22 RX ADMIN — LIDOCAINE 1 PATCH: 4 CREAM TOPICAL at 11:23

## 2017-08-22 RX ADMIN — LOSARTAN POTASSIUM 100 MILLIGRAM(S): 100 TABLET, FILM COATED ORAL at 06:06

## 2017-08-22 RX ADMIN — Medication 2: at 11:22

## 2017-08-22 RX ADMIN — MEROPENEM 200 MILLIGRAM(S): 1 INJECTION INTRAVENOUS at 13:04

## 2017-08-22 RX ADMIN — Medication 1 TABLET(S): at 22:41

## 2017-08-22 RX ADMIN — PANTOPRAZOLE SODIUM 40 MILLIGRAM(S): 20 TABLET, DELAYED RELEASE ORAL at 09:27

## 2017-08-22 RX ADMIN — Medication 1: at 16:37

## 2017-08-22 RX ADMIN — Medication 81 MILLIGRAM(S): at 11:23

## 2017-08-22 RX ADMIN — MEROPENEM 200 MILLIGRAM(S): 1 INJECTION INTRAVENOUS at 06:01

## 2017-08-22 RX ADMIN — BUPROPION HYDROCHLORIDE 300 MILLIGRAM(S): 150 TABLET, EXTENDED RELEASE ORAL at 11:23

## 2017-08-22 RX ADMIN — ATENOLOL 50 MILLIGRAM(S): 25 TABLET ORAL at 06:01

## 2017-08-22 NOTE — PROGRESS NOTE ADULT - SUBJECTIVE AND OBJECTIVE BOX
SURGERY PROGRESS HPI:  Pt seen and examined at bedside. States abdominal pain has significantly improved and that he is feeling much better. Pt tolerating clear liquid diet. Pt denies nausea and vomiting. Passing flatus often. Had one loose BM yesterday midday. +BM/flatus. Pt denies chest pain, SOB, dizziness, fever, chills.     Vital Signs Last 24 Hrs  T(C): 37.1 (22 Aug 2017 05:09), Max: 37.7 (21 Aug 2017 16:57)  T(F): 98.8 (22 Aug 2017 05:09), Max: 99.8 (21 Aug 2017 16:57)  HR: 68 (22 Aug 2017 05:09) (64 - 68)  BP: 146/98 (22 Aug 2017 05:09) (146/98 - 162/96)  BP(mean): --  RR: 18 (22 Aug 2017 05:09) (16 - 18)  SpO2: 97% (22 Aug 2017 05:09) (95% - 97%)      PHYSICAL EXAM:    GENERAL: NAD  HEAD:  Atraumatic, Normocephalic  CHEST/LUNG: Clear to ausculation, bilaterally   HEART: RRR S1S2  ABDOMEN: non distended, +BS, soft, non tender, no guarding  EXTREMITIES:  calf soft, non tender b/l    I&O's Detail    20 Aug 2017 07:01  -  21 Aug 2017 07:00  --------------------------------------------------------  IN:    dextrose 5% + sodium chloride 0.45% with potassium chloride 20 mEq/L: 1500 mL    Solution: 200 mL  Total IN: 1700 mL    OUT:  Total OUT: 0 mL    Total NET: 1700 mL      21 Aug 2017 07:01  -  22 Aug 2017 06:24  --------------------------------------------------------  IN:    dextrose 5% + sodium chloride 0.45% with potassium chloride 20 mEq/L: 550 mL    Oral Fluid: 240 mL    Solution: 200 mL  Total IN: 990 mL    OUT:    Voided: 950 mL  Total OUT: 950 mL    Total NET: 40 mL    LABS:                        14.0   9.8   )-----------( 378      ( 20 Aug 2017 07:49 )             40.6     08-20    145  |  108  |  21  ----------------------------<  170<H>  3.8   |  27  |  0.64    Ca    8.1<L>      20 Aug 2017 07:49  Phos  1.9     08-20  Mg     2.8     08-20    TPro  6.6  /  Alb  2.5<L>  /  TBili  0.5  /  DBili  x   /  AST  20  /  ALT  43  /  AlkPhos  52  08-20    Xray Abdomen Multiple Views (08.21.17 @ 10:54)   IMPRESSION:   Decreased small bowel dilatation.     CT Enterography w/ Oral Cont and w/ IV Cont (08.20.17 @ 13:53)   IMPRESSION:   Stable bowel distention, partial obstruction versus ileus. Enteric   contrast passes into the colon. No evidence of obstructing mass. Mild   focal nonspecific small bowel wall thickening right abdomen. Other   incidental comments as above.      Assessment: 66 year old male PMH DM, HTN admitted with fever, new AF, left sided weakness who had PSBO vs. ileus. Now with +BM.    Plan:  -plan to advance diet to fulls today, will d/w Dr Hernández  -pain management prn  -continue DVT prophylaxis, OOB, Ambulating  -f/u labs  -continue current medical management   -will discuss pt with surgical attending

## 2017-08-22 NOTE — PROGRESS NOTE ADULT - SUBJECTIVE AND OBJECTIVE BOX
HPI:  67 y/o M w hx of DM, htn, presents w fever and abd pain x 1 week; reports the pain is in bilateral lower quadrant; denies vomiting; denies diarrhea; denies urinary symptoms; + cough; denies CP/SOB; denies lower ext swelling; no headache or neck stiffness (12 Aug 2017 15:51)      INTERVAL HPI / OVERNIGHT EVENTS:  Patient seen and examined lying in bed. Awake denies fever or chills. Still unable to move left side       Allergies: No Known Allergies    Intolerances        MEDICATIONS  (STANDING):  aspirin enteric coated 81 milliGRAM(s) Oral daily  ATENolol  Tablet 50 milliGRAM(s) Oral daily  insulin lispro (HumaLOG) corrective regimen sliding scale   SubCutaneous three times a day before meals  dextrose 5%. 1000 milliLiter(s) (50 mL/Hr) IV Continuous <Continuous>  dextrose 50% Injectable 12.5 Gram(s) IV Push once  dextrose 50% Injectable 25 Gram(s) IV Push once  dextrose 50% Injectable 25 Gram(s) IV Push once  lidocaine   Patch 1 Patch Transdermal daily  buPROPion XL . 300 milliGRAM(s) Oral daily  meropenem IVPB 500 milliGRAM(s) IV Intermittent every 8 hours  rivaroxaban 20 milliGRAM(s) Oral <User Schedule>  losartan 100 milliGRAM(s) Oral daily  pantoprazole    Tablet 40 milliGRAM(s) Oral before breakfast  dextrose 5% + sodium chloride 0.45%. 1000 milliLiter(s) (50 mL/Hr) IV Continuous <Continuous>    MEDICATIONS  (PRN):  dextrose Gel 1 Dose(s) Oral once PRN Blood Glucose LESS THAN 70 milliGRAM(s)/deciliter  glucagon  Injectable 1 milliGRAM(s) IntraMuscular once PRN Glucose LESS THAN 70 milligrams/deciliter  acetaminophen   Tablet 650 milliGRAM(s) Oral every 6 hours PRN For Temp greater than 38 C (100.4 F)  diphenhydrAMINE   Capsule 25 milliGRAM(s) Oral once PRN Insomnia  ondansetron Injectable 4 milliGRAM(s) IV Push every 6 hours PRN Nausea and/or Vomiting  benzocaine 15 mG/menthol 3.6 mG Lozenge 1 Lozenge Oral every 6 hours PRN Sore Throat      REVIEW OF SYSTEMS:    CONSTITUTIONAL: No fever, chills, weight loss, or fatigue  HEENT: No sore throat, runny nose, ear ache  RESPIRATORY: No cough, wheezing, No shortness of breath  CARDIOVASCULAR: No chest pain, palpitations, dizziness  GASTROINTESTINAL: No abdominal pain. No nausea, vomiting, diarrhea  GENITOURINARY: No dysuria, increase frequency, hematuria, or incontinence  NEUROLOGICAL: No headaches, memory loss,  + loss of strength, + numbness, or No tremors, + left sided weakness  EXTREMITY: No calf pain, edema   SKIN: No itching, burning, rashes, or lesions     VITAL SIGNS:  T(C): 37.1 (08-22-17 @ 10:57), Max: 37.7 (08-21-17 @ 16:57)  T(F): 98.7 (08-22-17 @ 10:57), Max: 99.8 (08-21-17 @ 16:57)  HR: 76 (08-22-17 @ 10:57) (64 - 76)  BP: 143/94 (08-22-17 @ 10:57) (143/94 - 162/96)  RR: 22 (08-22-17 @ 10:57) (16 - 22)  SpO2: 96% (08-22-17 @ 10:57) (95% - 97%)  Wt(kg): --    PHYSICAL EXAM:    GENERAL: not in any distress  HEENT: Neck is supple, normocephalic, atraumatic   CHEST/LUNG: Clear to percussion bilaterally; No rales, rhonchi, wheezing  HEART: Regular rate and rhythm; No murmurs, rubs, or gallops  ABDOMEN: Soft, Nontender, Nondistended; Bowel sounds present, no rebound   EXTREMITIES:  2+ Peripheral Pulses, No clubbing, cyanosis, or edema, left hand + grasp, diminished sensation left upper arm + left hemiparesis   SKIN: No rashes or lesions  BACK: no pressor sore   NERVOUS SYSTEM:  Alert & Oriented X3, Good concentration  PSYCH: normal affect     LABS:                         15.5   8.7   )-----------( 438      ( 22 Aug 2017 07:45 )             44.1     08-22    141  |  105  |  22  ----------------------------<  157<H>  4.5   |  27  |  0.83    Ca    8.7      22 Aug 2017 07:45    TPro  7.0  /  Alb  2.8<L>  /  TBili  1.5<H>  /  DBili  x   /  AST  91<H>  /  ALT  123<H>  /  AlkPhos  87  08-22    LIVER FUNCTIONS - ( 22 Aug 2017 07:45 )  Alb: 2.8 g/dL / Pro: 7.0 gm/dL / ALK PHOS: 87 U/L / ALT: 123 U/L / AST: 91 U/L / GGT: x             Culture - Blood (08.18.17 @ 10:06)    Specimen Source: .Blood Blood-Venous    Culture Results:   No growth to date.    Culture - Blood (08.18.17 @ 10:06)    Specimen Source: .Blood Blood-Venous    Culture Results:   No growth to date.          Radiology: 65 y/o M w hx of DM, htn, presents w fever and abd pain x 1 week; reports the pain is in bilateral lower quadrant; denies vomiting; denies diarrhea; denies urinary symptoms; + cough; denies CP/SOB; denies lower ext swelling; no headache or neck stiffness (12 Aug 2017 15:51)  here last high tempt on 8/13; then 100.8 on 8/14  now afebrile x > 1 week   main issues is left weakness  i discussed with dr hernandez again   awaiting follow up   also issues with partial sbo today           INTERVAL HPI / OVERNIGHT EVENTS:  Patient seen and examined lying in bed. Awake denies fever or chills. Still unable to move left side       Allergies: No Known Allergies    Intolerances        MEDICATIONS  (STANDING):  aspirin enteric coated 81 milliGRAM(s) Oral daily  ATENolol  Tablet 50 milliGRAM(s) Oral daily  insulin lispro (HumaLOG) corrective regimen sliding scale   SubCutaneous three times a day before meals  dextrose 5%. 1000 milliLiter(s) (50 mL/Hr) IV Continuous <Continuous>  dextrose 50% Injectable 12.5 Gram(s) IV Push once  dextrose 50% Injectable 25 Gram(s) IV Push once  dextrose 50% Injectable 25 Gram(s) IV Push once  lidocaine   Patch 1 Patch Transdermal daily  buPROPion XL . 300 milliGRAM(s) Oral daily  meropenem IVPB 500 milliGRAM(s) IV Intermittent every 8 hours  rivaroxaban 20 milliGRAM(s) Oral <User Schedule>  losartan 100 milliGRAM(s) Oral daily  pantoprazole    Tablet 40 milliGRAM(s) Oral before breakfast  dextrose 5% + sodium chloride 0.45%. 1000 milliLiter(s) (50 mL/Hr) IV Continuous <Continuous>    MEDICATIONS  (PRN):  dextrose Gel 1 Dose(s) Oral once PRN Blood Glucose LESS THAN 70 milliGRAM(s)/deciliter  glucagon  Injectable 1 milliGRAM(s) IntraMuscular once PRN Glucose LESS THAN 70 milligrams/deciliter  acetaminophen   Tablet 650 milliGRAM(s) Oral every 6 hours PRN For Temp greater than 38 C (100.4 F)  diphenhydrAMINE   Capsule 25 milliGRAM(s) Oral once PRN Insomnia  ondansetron Injectable 4 milliGRAM(s) IV Push every 6 hours PRN Nausea and/or Vomiting  benzocaine 15 mG/menthol 3.6 mG Lozenge 1 Lozenge Oral every 6 hours PRN Sore Throat      REVIEW OF SYSTEMS:    CONSTITUTIONAL: No fever, chills, weight loss, or fatigue  HEENT: No sore throat, runny nose, ear ache  RESPIRATORY: No cough, wheezing, No shortness of breath  CARDIOVASCULAR: No chest pain, palpitations, dizziness  GASTROINTESTINAL: No abdominal pain. No nausea, vomiting, diarrhea  GENITOURINARY: No dysuria, increase frequency, hematuria, or incontinence  NEUROLOGICAL: No headaches, memory loss,  + loss of strength, + numbness, or No tremors, + left sided weakness  EXTREMITY: No calf pain, edema   SKIN: No itching, burning, rashes, or lesions     VITAL SIGNS:  T(C): 37.1 (08-22-17 @ 10:57), Max: 37.7 (08-21-17 @ 16:57)  T(F): 98.7 (08-22-17 @ 10:57), Max: 99.8 (08-21-17 @ 16:57)  HR: 76 (08-22-17 @ 10:57) (64 - 76)  BP: 143/94 (08-22-17 @ 10:57) (143/94 - 162/96)  RR: 22 (08-22-17 @ 10:57) (16 - 22)  SpO2: 96% (08-22-17 @ 10:57) (95% - 97%)  Wt(kg): --    PHYSICAL EXAM:    GENERAL: not in any distress  HEENT: Neck is supple, normocephalic, atraumatic   CHEST/LUNG: Clear  bilaterally; No rales, rhonchi, wheezing  HEART: Regular rate and rhythm; No murmurs, rubs, or gallops  ABDOMEN: Soft, Nontender, Nondistended; Bowel sounds present, no rebound   EXTREMITIES:  2+ Peripheral Pulses, No clubbing, cyanosis, or edema, left hand + grasp, diminished sensation left upper arm + left hemiparesis   SKIN: No rashes or lesions  BACK: no pressor sore   NERVOUS SYSTEM:  Alert & Oriented X3, Good concentration  PSYCH: normal affect     LABS:                         15.5   8.7   )-----------( 438      ( 22 Aug 2017 07:45 )             44.1     08-22    141  |  105  |  22  ----------------------------<  157<H>  4.5   |  27  |  0.83    Ca    8.7      22 Aug 2017 07:45    TPro  7.0  /  Alb  2.8<L>  /  TBili  1.5<H>  /  DBili  x   /  AST  91<H>  /  ALT  123<H>  /  AlkPhos  87  08-22    LIVER FUNCTIONS - ( 22 Aug 2017 07:45 )  Alb: 2.8 g/dL / Pro: 7.0 gm/dL / ALK PHOS: 87 U/L / ALT: 123 U/L / AST: 91 U/L / GGT: x             Culture - Blood (08.18.17 @ 10:06)    Specimen Source: .Blood Blood-Venous    Culture Results:   No growth to date.    Culture - Blood (08.18.17 @ 10:06)    Specimen Source: .Blood Blood-Venous    Culture Results:   No growth to date.          Radiology:

## 2017-08-22 NOTE — PROGRESS NOTE ADULT - SUBJECTIVE AND OBJECTIVE BOX
INTERVAL HPI/OVERNIGHT EVENTS:      tolerating food.  Moving left upper extremity better.  Surgical and ID notes appreciated.  Blood cultures negative      Antimicrobial:  meropenem IVPB 500 milliGRAM(s) IV Intermittent every 8 hours    Cardiovascular:  ATENolol  Tablet 50 milliGRAM(s) Oral daily  losartan 100 milliGRAM(s) Oral daily    Pulmonary:    Hematalogic:  aspirin enteric coated 81 milliGRAM(s) Oral daily  rivaroxaban 20 milliGRAM(s) Oral <User Schedule>    Other:  insulin lispro (HumaLOG) corrective regimen sliding scale   SubCutaneous three times a day before meals  dextrose 5%. 1000 milliLiter(s) IV Continuous <Continuous>  dextrose Gel 1 Dose(s) Oral once PRN  dextrose 50% Injectable 12.5 Gram(s) IV Push once  dextrose 50% Injectable 25 Gram(s) IV Push once  dextrose 50% Injectable 25 Gram(s) IV Push once  glucagon  Injectable 1 milliGRAM(s) IntraMuscular once PRN  lidocaine   Patch 1 Patch Transdermal daily  acetaminophen   Tablet 650 milliGRAM(s) Oral every 6 hours PRN  buPROPion XL . 300 milliGRAM(s) Oral daily  diphenhydrAMINE   Capsule 25 milliGRAM(s) Oral once PRN  ondansetron Injectable 4 milliGRAM(s) IV Push every 6 hours PRN  benzocaine 15 mG/menthol 3.6 mG Lozenge 1 Lozenge Oral every 6 hours PRN  pantoprazole    Tablet 40 milliGRAM(s) Oral before breakfast  dextrose 5% + sodium chloride 0.45%. 1000 milliLiter(s) IV Continuous <Continuous>    aspirin enteric coated 81 milliGRAM(s) Oral daily  ATENolol  Tablet 50 milliGRAM(s) Oral daily  insulin lispro (HumaLOG) corrective regimen sliding scale   SubCutaneous three times a day before meals  dextrose 5%. 1000 milliLiter(s) IV Continuous <Continuous>  dextrose Gel 1 Dose(s) Oral once PRN  dextrose 50% Injectable 12.5 Gram(s) IV Push once  dextrose 50% Injectable 25 Gram(s) IV Push once  dextrose 50% Injectable 25 Gram(s) IV Push once  glucagon  Injectable 1 milliGRAM(s) IntraMuscular once PRN  lidocaine   Patch 1 Patch Transdermal daily  acetaminophen   Tablet 650 milliGRAM(s) Oral every 6 hours PRN  buPROPion XL . 300 milliGRAM(s) Oral daily  meropenem IVPB 500 milliGRAM(s) IV Intermittent every 8 hours  rivaroxaban 20 milliGRAM(s) Oral <User Schedule>  diphenhydrAMINE   Capsule 25 milliGRAM(s) Oral once PRN  ondansetron Injectable 4 milliGRAM(s) IV Push every 6 hours PRN  benzocaine 15 mG/menthol 3.6 mG Lozenge 1 Lozenge Oral every 6 hours PRN  losartan 100 milliGRAM(s) Oral daily  pantoprazole    Tablet 40 milliGRAM(s) Oral before breakfast  dextrose 5% + sodium chloride 0.45%. 1000 milliLiter(s) IV Continuous <Continuous>    Drug Dosing Weight  Height (cm): 182.88 (12 Aug 2017 10:02)  Weight (kg): 83.9 (12 Aug 2017 18:00)  BMI (kg/m2): 25.1 (12 Aug 2017 18:00)  BSA (m2): 2.06 (12 Aug 2017 18:00)        SCALES: [ ] YES [ ] NO    DATE INSERTED:  REMOVE:  [ ] YES [ ] NO  EXPLAIN:      PAST MEDICAL & SURGICAL HISTORY:  Diabetes  HTN (hypertension)      REVIEW OF SYSTEMS:    CONSTITUTIONAL: No fever, weight loss, or fatigue  EYES: No eye pain, visual disturbances, or discharge  ENMT:  No difficulty hearing, tinnitus, vertigo; No sinus or throat pain  NECK: No pain or stiffness  BREASTS: No pain, masses, or nipple discharge  RESPIRATORY: No cough, wheezing, chills or hemoptysis; No shortness of breath  CARDIOVASCULAR: No chest pain, palpitations, dizziness, or leg swelling  GASTROINTESTINAL: No abdominal or epigastric pain. No nausea, vomiting, or hematemesis; No diarrhea or constipation. No melena or hematochezia.  GENITOURINARY: No dysuria, frequency, hematuria, or incontinence  NEUROLOGICAL: No headaches, memory loss, loss of strength, numbness, or tremors  SKIN: No itching, burning, rashes, or lesions   LYMPH NODES: No enlarged glands  ENDOCRINE: No heat or cold intolerance; No hair loss  MUSCULOSKELETAL: No joint pain or swelling; No muscle, back, or extremity pain  PSYCHIATRIC: No depression, anxiety, mood swings, or difficulty sleeping  HEME/LYMPH: No easy bruising, or bleeding gums  ALLERGY AND IMMUNOLOGIC: No hives or eczema      T(C): 36.2 (08-22-17 @ 16:25), Max: 37.1 (08-22-17 @ 05:09)  HR: 68 (08-22-17 @ 16:25) (65 - 76)  BP: 118/89 (08-22-17 @ 16:25) (118/89 - 148/87)  RR: 19 (08-22-17 @ 16:25) (16 - 22)  SpO2: 94% (08-22-17 @ 16:25) (94% - 97%)  Wt(kg): --        I&O's Detail    21 Aug 2017 07:01  -  22 Aug 2017 07:00  --------------------------------------------------------  IN:    dextrose 5% + sodium chloride 0.45% with potassium chloride 20 mEq/L: 550 mL    Oral Fluid: 240 mL    Solution: 200 mL  Total IN: 990 mL    OUT:    Voided: 950 mL  Total OUT: 950 mL    Total NET: 40 mL      22 Aug 2017 07:01  -  22 Aug 2017 20:39  --------------------------------------------------------  IN:    dextrose 5% + sodium chloride 0.45% with potassium chloride 20 mEq/L: 200 mL    dextrose 5% + sodium chloride 0.45%.: 400 mL    Oral Fluid: 540 mL  Total IN: 1140 mL    OUT:    Voided: 200 mL  Total OUT: 200 mL    Total NET: 940 mL            PHYSICAL EXAM:    GENERAL: NAD, well-groomed, well-developed  HEAD:  Atraumatic, Normocephalic  EYES: EOMI, PERRLA, conjunctiva and sclera clear  ENMT: No tonsillar erythema, exudates, or enlargement; Moist mucous membranes, Good dentition, No lesions  NECK: Supple, No JVD, Normal thyroid  NERVOUS SYSTEM:  Alert & Oriented X3, Good concentration; Motor Strength 5/5 B/L upper and lower extremities; DTRs 2+ intact and symmetric  CHEST/LUNG: Clear to percussion bilaterally; No rales, rhonchi, wheezing, or rubs  HEART: Regular rate and rhythm; No murmurs, rubs, or gallops  ABDOMEN: Soft, Nontender, Nondistended; Bowel sounds present  EXTREMITIES:  2+ Peripheral Pulses, No clubbing, cyanosis, or edema  LYMPH: No lymphadenopathy noted  SKIN: No rashes or lesions      LABS:  CBC Full  -  ( 22 Aug 2017 07:45 )  WBC Count : 8.7 K/uL  Hemoglobin : 15.5 g/dL  Hematocrit : 44.1 %  Platelet Count - Automated : 438 K/uL  Mean Cell Volume : 89.4 fl  Mean Cell Hemoglobin : 31.4 pg  Mean Cell Hemoglobin Concentration : 35.1 gm/dL  Auto Neutrophil # : x  Auto Lymphocyte # : x  Auto Monocyte # : x  Auto Eosinophil # : x  Auto Basophil # : x  Auto Neutrophil % : x  Auto Lymphocyte % : x  Auto Monocyte % : x  Auto Eosinophil % : x  Auto Basophil % : x    08-22    141  |  105  |  22  ----------------------------<  157<H>  4.5   |  27  |  0.83    Ca    8.7      22 Aug 2017 07:45    TPro  7.0  /  Alb  2.8<L>  /  TBili  1.5<H>  /  DBili  x   /  AST  91<H>  /  ALT  123<H>  /  AlkPhos  87  08-22            RADIOLOGY & ADDITIONAL STUDIES:

## 2017-08-22 NOTE — PROGRESS NOTE ADULT - ASSESSMENT
fever resolved   acute neuro abnormalities ?? from   now [facility]   left sided weakness ;    neuro follow up ;; discussed with dr hernandez again today   he will follow up   Discussed with PMD ? need for EMG    all culture NEGATIVE so far  follow ct abdominal fever resolved   acute neuro abnormalities ?? from   now [facility]bo   left sided weakness ;    neuro follow up ;; discussed with dr hernandez repeatedly   he will follow up   Discussed with PMD ? need for EMG    all culture NEGATIVE so far

## 2017-08-22 NOTE — PROGRESS NOTE ADULT - SUBJECTIVE AND OBJECTIVE BOX
Patient is a 66y old  Male who presents with a chief complaint of fever, abdominal pain (12 Aug 2017 15:51)        PAST MEDICAL & SURGICAL HISTORY:  Diabetes  HTN (hypertension)      Summary of admission HPI: CVA Afib NSTEMI.                PREVIOUS DIAGNOSTIC TESTING:      ECHO  FINDINGS:    STRESS  FINDINGS:    CATHETERIZATION  FINDINGS:    ELECTROPHYSIOLOGY STUDY  FINDINGS:    CAROTID ULTRASOUND:  FINDINGS    VENOUS DUPLEX SCAN:  FINDINGS:    CHEST CT PULMONARY ANGIO with IV Contrast:  FINDINGS:  MEDICATIONS  (STANDING):  aspirin enteric coated 81 milliGRAM(s) Oral daily  ATENolol  Tablet 50 milliGRAM(s) Oral daily  insulin lispro (HumaLOG) corrective regimen sliding scale   SubCutaneous three times a day before meals  dextrose 5%. 1000 milliLiter(s) (50 mL/Hr) IV Continuous <Continuous>  dextrose 50% Injectable 12.5 Gram(s) IV Push once  dextrose 50% Injectable 25 Gram(s) IV Push once  dextrose 50% Injectable 25 Gram(s) IV Push once  lidocaine   Patch 1 Patch Transdermal daily  buPROPion XL . 300 milliGRAM(s) Oral daily  meropenem IVPB 500 milliGRAM(s) IV Intermittent every 8 hours  rivaroxaban 20 milliGRAM(s) Oral <User Schedule>  losartan 100 milliGRAM(s) Oral daily  pantoprazole    Tablet 40 milliGRAM(s) Oral before breakfast  dextrose 5% + sodium chloride 0.45%. 1000 milliLiter(s) (50 mL/Hr) IV Continuous <Continuous>    MEDICATIONS  (PRN):  dextrose Gel 1 Dose(s) Oral once PRN Blood Glucose LESS THAN 70 milliGRAM(s)/deciliter  glucagon  Injectable 1 milliGRAM(s) IntraMuscular once PRN Glucose LESS THAN 70 milligrams/deciliter  acetaminophen   Tablet 650 milliGRAM(s) Oral every 6 hours PRN For Temp greater than 38 C (100.4 F)  diphenhydrAMINE   Capsule 25 milliGRAM(s) Oral once PRN Insomnia  ondansetron Injectable 4 milliGRAM(s) IV Push every 6 hours PRN Nausea and/or Vomiting  benzocaine 15 mG/menthol 3.6 mG Lozenge 1 Lozenge Oral every 6 hours PRN Sore Throat      FAMILY HISTORY:      SOCIAL HISTORY:    CIGARETTES:    ALCOHOL:    REVIEW OF SYSTEMS:    CONSTITUTIONAL: No fever, weight loss, chills, shakes, or fatigue  EYES: No eye pain, visual disturbances, or discharge  ENMT:  No difficulty hearing, tinnitus, vertigo; No sinus or throat pain  NECK: No pain or stiffness  BREASTS: No pain, masses, or nipple discharge  RESPIRATORY: No cough, wheezing, hemoptysis, or shortness of breath  CARDIOVASCULAR: No chest pain, dyspnea, palpitations, dizziness, syncope, paroxysmal nocturnal dyspnea, orthopnea, or arm or leg swelling  GASTROINTESTINAL: No abdominal  or epigastric pain, nausea, vomiting, hematemesis, diarrhea, constipation, melena or bright red blood.  GENITOURINARY: No dysuria, nocturia, hematuria, or urinary incontinence  NEUROLOGICAL: No headaches, memory loss, slurred speech, limb weakness, loss of strength, numbness, or tremors  SKIN: No itching, burning, rashes, or lesions   LYMPH NODES: No enlarged glands  ENDOCRINE: No heat or cold intolerance, or hair loss  MUSCULOSKELETAL: No joint pain or swelling, muscle, back, or extremity pain  PSYCHIATRIC: No depression, anxiety, or difficulty sleeping  HEME/LYMPH: No easy bruising or bleeding gums  ALLERY AND IMMUNOLOGIC: No hives or rash.      Vital Signs Last 24 Hrs  T(C): 36.2 (22 Aug 2017 16:25), Max: 37.1 (22 Aug 2017 05:09)  T(F): 97.2 (22 Aug 2017 16:25), Max: 98.8 (22 Aug 2017 05:09)  HR: 68 (22 Aug 2017 16:25) (65 - 76)  BP: 118/89 (22 Aug 2017 16:25) (118/89 - 148/87)  BP(mean): --  RR: 19 (22 Aug 2017 16:25) (16 - 22)  SpO2: 94% (22 Aug 2017 16:25) (94% - 97%)    PHYSICAL EXAM:    GENERAL: In no apparent distress, well nourished, and hydrated.  HEAD:  Atraumatic, Normocephalic  EYES: EOMI, PERRLA, conjunctiva and sclera clear  ENMT: No tonsillar erythema, exudates, or enlargement; Moist mucous membranes, Good dentition, No lesions  NECK: Supple and normal thyroid.  No JVD or carotid bruit.  Carotid pulse is 2+ bilaterally.  HEART: Regular rate and rhythm; No murmurs, rubs, or gallops.  PULMONARY: Clear to auscultation and perfusion.  No rales, wheezing, or rhonchi bilaterally.  ABDOMEN: Soft, Nontender, Nondistended; Bowel sounds present  EXTREMITIES:  2+ Peripheral Pulses, No clubbing, cyanosis, or edema  LYMPH: No lymphadenopathy noted  NEUROLOGICAL: Grossly nonfocal          INTERPRETATION OF TELEMETRY:    ECG:    I&O's Detail    21 Aug 2017 07:01  -  22 Aug 2017 07:00  --------------------------------------------------------  IN:    dextrose 5% + sodium chloride 0.45% with potassium chloride 20 mEq/L: 550 mL    Oral Fluid: 240 mL    Solution: 200 mL  Total IN: 990 mL    OUT:    Voided: 950 mL  Total OUT: 950 mL    Total NET: 40 mL          LABS:                        15.5   8.7   )-----------( 438      ( 22 Aug 2017 07:45 )             44.1     08-22    141  |  105  |  22  ----------------------------<  157<H>  4.5   |  27  |  0.83    Ca    8.7      22 Aug 2017 07:45    TPro  7.0  /  Alb  2.8<L>  /  TBili  1.5<H>  /  DBili  x   /  AST  91<H>  /  ALT  123<H>  /  AlkPhos  87  08-22            BNP  I&O's Detail    21 Aug 2017 07:01  -  22 Aug 2017 07:00  --------------------------------------------------------  IN:    dextrose 5% + sodium chloride 0.45% with potassium chloride 20 mEq/L: 550 mL    Oral Fluid: 240 mL    Solution: 200 mL  Total IN: 990 mL    OUT:    Voided: 950 mL  Total OUT: 950 mL    Total NET: 40 mL        Daily     Daily     RADIOLOGY & ADDITIONAL STUDIES:

## 2017-08-23 LAB
ANION GAP SERPL CALC-SCNC: 10 MMOL/L — SIGNIFICANT CHANGE UP (ref 5–17)
BUN SERPL-MCNC: 27 MG/DL — HIGH (ref 7–23)
CALCIUM SERPL-MCNC: 8.4 MG/DL — LOW (ref 8.5–10.1)
CHLORIDE SERPL-SCNC: 106 MMOL/L — SIGNIFICANT CHANGE UP (ref 96–108)
CO2 SERPL-SCNC: 25 MMOL/L — SIGNIFICANT CHANGE UP (ref 22–31)
CREAT SERPL-MCNC: 0.97 MG/DL — SIGNIFICANT CHANGE UP (ref 0.5–1.3)
CULTURE RESULTS: SIGNIFICANT CHANGE UP
CULTURE RESULTS: SIGNIFICANT CHANGE UP
GLUCOSE SERPL-MCNC: 151 MG/DL — HIGH (ref 70–99)
HCT VFR BLD CALC: 42.5 % — SIGNIFICANT CHANGE UP (ref 39–50)
HGB BLD-MCNC: 15.5 G/DL — SIGNIFICANT CHANGE UP (ref 13–17)
MAGNESIUM SERPL-MCNC: 2.9 MG/DL — HIGH (ref 1.6–2.6)
MCHC RBC-ENTMCNC: 32.7 PG — SIGNIFICANT CHANGE UP (ref 27–34)
MCHC RBC-ENTMCNC: 36.4 GM/DL — HIGH (ref 32–36)
MCV RBC AUTO: 89.8 FL — SIGNIFICANT CHANGE UP (ref 80–100)
PHOSPHATE SERPL-MCNC: 3.3 MG/DL — SIGNIFICANT CHANGE UP (ref 2.5–4.5)
PLATELET # BLD AUTO: 396 K/UL — SIGNIFICANT CHANGE UP (ref 150–400)
POTASSIUM SERPL-MCNC: 4.6 MMOL/L — SIGNIFICANT CHANGE UP (ref 3.5–5.3)
POTASSIUM SERPL-SCNC: 4.6 MMOL/L — SIGNIFICANT CHANGE UP (ref 3.5–5.3)
RBC # BLD: 4.74 M/UL — SIGNIFICANT CHANGE UP (ref 4.2–5.8)
RBC # FLD: 12.3 % — SIGNIFICANT CHANGE UP (ref 11–15)
SODIUM SERPL-SCNC: 141 MMOL/L — SIGNIFICANT CHANGE UP (ref 135–145)
SPECIMEN SOURCE: SIGNIFICANT CHANGE UP
SPECIMEN SOURCE: SIGNIFICANT CHANGE UP
WBC # BLD: 10.8 K/UL — HIGH (ref 3.8–10.5)
WBC # FLD AUTO: 10.8 K/UL — HIGH (ref 3.8–10.5)

## 2017-08-23 PROCEDURE — 70450 CT HEAD/BRAIN W/O DYE: CPT | Mod: 26

## 2017-08-23 RX ADMIN — PANTOPRAZOLE SODIUM 40 MILLIGRAM(S): 20 TABLET, DELAYED RELEASE ORAL at 09:24

## 2017-08-23 RX ADMIN — ATENOLOL 50 MILLIGRAM(S): 25 TABLET ORAL at 05:12

## 2017-08-23 RX ADMIN — Medication 1 TABLET(S): at 09:25

## 2017-08-23 RX ADMIN — MEROPENEM 200 MILLIGRAM(S): 1 INJECTION INTRAVENOUS at 13:44

## 2017-08-23 RX ADMIN — Medication 1: at 16:56

## 2017-08-23 RX ADMIN — Medication 1 TABLET(S): at 12:16

## 2017-08-23 RX ADMIN — MEROPENEM 200 MILLIGRAM(S): 1 INJECTION INTRAVENOUS at 21:39

## 2017-08-23 RX ADMIN — LIDOCAINE 1 PATCH: 4 CREAM TOPICAL at 13:44

## 2017-08-23 RX ADMIN — Medication 1 TABLET(S): at 16:58

## 2017-08-23 RX ADMIN — SODIUM CHLORIDE 50 MILLILITER(S): 9 INJECTION, SOLUTION INTRAVENOUS at 17:39

## 2017-08-23 RX ADMIN — Medication 1 TABLET(S): at 21:39

## 2017-08-23 RX ADMIN — Medication 1: at 09:24

## 2017-08-23 RX ADMIN — MEROPENEM 200 MILLIGRAM(S): 1 INJECTION INTRAVENOUS at 05:12

## 2017-08-23 RX ADMIN — RIVAROXABAN 20 MILLIGRAM(S): KIT at 12:16

## 2017-08-23 RX ADMIN — BUPROPION HYDROCHLORIDE 300 MILLIGRAM(S): 150 TABLET, EXTENDED RELEASE ORAL at 12:16

## 2017-08-23 RX ADMIN — Medication 81 MILLIGRAM(S): at 12:16

## 2017-08-23 RX ADMIN — Medication 1: at 12:09

## 2017-08-23 RX ADMIN — LOSARTAN POTASSIUM 100 MILLIGRAM(S): 100 TABLET, FILM COATED ORAL at 05:12

## 2017-08-23 NOTE — PROGRESS NOTE ADULT - ASSESSMENT
fever resolved   acute neuro abnormalities ?? from   now [facility]   left sided weakness ;    neuro follow up ;; discussed with dr hernandez repeatedly   he will follow up   Discussed with PMD ? need for EMG    all culture NEGATIVE so far fever resolved   acute neuro abnormalities ?? from   now [facility]   left sided weakness ;    neuro follow up noted  repeat ct NEGATIVE   left weakness unexplained; neuro follow up ; ct today noted  all culture NEGATIVE so far

## 2017-08-23 NOTE — PROGRESS NOTE ADULT - SUBJECTIVE AND OBJECTIVE BOX
Patient seen and examined at bedside in no distress.  Requesting regular food.  Tolerating full liquids; denies abdominal pain/nausea/vomiting.  +flatus. Admits to normal BM 2 days ago.  Denies fever, chills, chest pain, sob.   OOB to chair with PT.     Vital Signs Last 24 Hrs  T(F): 98.4 (08-23-17 @ 05:18), Max: 98.7 (08-22-17 @ 10:57)  HR: 68 (08-23-17 @ 05:18)  BP: 143/80 (08-23-17 @ 05:18)  RR: 16 (08-23-17 @ 05:18)  SpO2: 96% (08-23-17 @ 05:18)    GENERAL: Alert, oriented, NAD  CHEST/LUNG: Clear to auscultation bilaterally, respirations nonlabored  HEART: S1S2, Regular rate and rhythm  ABDOMEN: + Bowel sounds, soft, nondistended, nontender  EXTREMITIES:  no calf tenderness, no pedal edema b/l     LABS:                        15.5   10.8  )-----------( 396      ( 23 Aug 2017 07:29 )             42.5     08-23    141  |  106  |  27<H>  ----------------------------<  151<H>  4.6   |  25  |  0.97    Ca    8.4<L>      23 Aug 2017 07:29  Phos  3.3     08-23  Mg     2.9     08-23    TPro  7.0  /  Alb  2.8<L>  /  TBili  1.5<H>  /  DBili  x   /  AST  91<H>  /  ALT  123<H>  /  AlkPhos  87  08-22    Impression: 66 year old male PMH DM, HTN admitted with fever, new AF, left sided weakness, PSBO vs. ileus   Plan:  - plan to advance to regular diet today  - CT head per neuro pending  - pain management PRN, incentive spirometer, DVT ppx   - continue all medical management and supportive care  - will d/w Dr Hernández Patient seen and examined at bedside in no distress.  Requesting regular food.  Tolerating full liquids; denies abdominal pain/nausea/vomiting.  +flatus. Admits to normal BM 2 days ago.  Denies fever, chills, chest pain, sob.   OOB to chair with PT.     Vital Signs Last 24 Hrs  T(F): 98.4 (08-23-17 @ 05:18), Max: 98.7 (08-22-17 @ 10:57)  HR: 68 (08-23-17 @ 05:18)  BP: 143/80 (08-23-17 @ 05:18)  RR: 16 (08-23-17 @ 05:18)  SpO2: 96% (08-23-17 @ 05:18)    GENERAL: Alert, oriented, NAD  CHEST/LUNG: Clear to auscultation bilaterally, respirations nonlabored  HEART: S1S2, Regular rate and rhythm  ABDOMEN: + Bowel sounds, soft, nondistended, nontender  EXTREMITIES:  no calf tenderness, no pedal edema b/l     LABS:                        15.5   10.8  )-----------( 396      ( 23 Aug 2017 07:29 )             42.5     08-23    141  |  106  |  27<H>  ----------------------------<  151<H>  4.6   |  25  |  0.97    Ca    8.4<L>      23 Aug 2017 07:29  Phos  3.3     08-23  Mg     2.9     08-23    TPro  7.0  /  Alb  2.8<L>  /  TBili  1.5<H>  /  DBili  x   /  AST  91<H>  /  ALT  123<H>  /  AlkPhos  87  08-22    Impression: 66 year old male PMH DM, HTN admitted with fever, new AF, left sided weakness, PSBO vs. ileus   Plan:  - advance to regular diet   - CT head per neuro pending  - pain management PRN, incentive spirometer, DVT ppx   - continue all medical management and supportive care  - patient cleared for discharge to rehab from surgical standpoint  - discussed with Dr Hernández

## 2017-08-23 NOTE — CHART NOTE - NSCHARTNOTEFT_GEN_A_CORE
Patient planned to discharge after completion of CT scan.  PT/ OT  recommendation for acute rehab.  Patient has the desire and ability  to partake   in the 3 hours of  OT/PT therapy provided at rehab   Patient will benefit greatly from acute rehab

## 2017-08-23 NOTE — PROGRESS NOTE ADULT - SUBJECTIVE AND OBJECTIVE BOX
HPI:  67 y/o M w hx of DM, htn, presents w fever and abd pain x 1 week; reports the pain is in bilateral lower quadrant; denies vomiting; denies diarrhea; denies urinary symptoms; + cough; denies CP/SOB; denies lower ext swelling; no headache or neck stiffness (12 Aug 2017 15:51)      Allergies    No Known Allergies    Intolerances        MEDICATIONS  (STANDING):  aspirin enteric coated 81 milliGRAM(s) Oral daily  ATENolol  Tablet 50 milliGRAM(s) Oral daily  insulin lispro (HumaLOG) corrective regimen sliding scale   SubCutaneous three times a day before meals  dextrose 5%. 1000 milliLiter(s) (50 mL/Hr) IV Continuous <Continuous>  dextrose 50% Injectable 12.5 Gram(s) IV Push once  dextrose 50% Injectable 25 Gram(s) IV Push once  dextrose 50% Injectable 25 Gram(s) IV Push once  lidocaine   Patch 1 Patch Transdermal daily  buPROPion XL . 300 milliGRAM(s) Oral daily  meropenem IVPB 500 milliGRAM(s) IV Intermittent every 8 hours  rivaroxaban 20 milliGRAM(s) Oral <User Schedule>  losartan 100 milliGRAM(s) Oral daily  pantoprazole    Tablet 40 milliGRAM(s) Oral before breakfast  dextrose 5% + sodium chloride 0.45%. 1000 milliLiter(s) (50 mL/Hr) IV Continuous <Continuous>  potassium acid phosphate/sodium acid phosphate tablet (K-PHOS No. 2) 1 Tablet(s) Oral four times a day with meals    MEDICATIONS  (PRN):  dextrose Gel 1 Dose(s) Oral once PRN Blood Glucose LESS THAN 70 milliGRAM(s)/deciliter  glucagon  Injectable 1 milliGRAM(s) IntraMuscular once PRN Glucose LESS THAN 70 milligrams/deciliter  acetaminophen   Tablet 650 milliGRAM(s) Oral every 6 hours PRN For Temp greater than 38 C (100.4 F)  diphenhydrAMINE   Capsule 25 milliGRAM(s) Oral once PRN Insomnia  ondansetron Injectable 4 milliGRAM(s) IV Push every 6 hours PRN Nausea and/or Vomiting  benzocaine 15 mG/menthol 3.6 mG Lozenge 1 Lozenge Oral every 6 hours PRN Sore Throat      REVIEW OF SYSTEMS:    CONSTITUTIONAL: No fever, chills, weight loss, or fatigue  HEENT: No sore throat, runny nose, ear ache  RESPIRATORY: No cough, wheezing, No shortness of breath  CARDIOVASCULAR: No chest pain, palpitations, dizziness  GASTROINTESTINAL: No abdominal pain. No nausea, vomiting, diarrhea  GENITOURINARY: No dysuria, increase frequency, hematuria, or incontinence  NEUROLOGICAL: No headaches, memory loss, loss of strength, numbness, or tremors, no weakness  EXTREMITY: No pedal edema BLE  SKIN: No itching, burning, rashes, or lesions     VITAL SIGNS:  T(C): 36.3 (08-23-17 @ 16:27), Max: 37 (08-23-17 @ 11:54)  T(F): 97.4 (08-23-17 @ 16:27), Max: 98.6 (08-23-17 @ 11:54)  HR: 68 (08-23-17 @ 16:27) (62 - 68)  BP: 122/88 (08-23-17 @ 16:27) (122/88 - 143/80)  RR: 17 (08-23-17 @ 16:27) (16 - 18)  SpO2: 95% (08-23-17 @ 16:27) (95% - 96%)  Wt(kg): --    PHYSICAL EXAM:    GENERAL: not in any distress  HEENT: Neck is supple, normocephalic, atraumatic   CHEST/LUNG: Clear to percussion bilaterally; No rales, rhonchi, wheezing  HEART: Regular rate and rhythm; No murmurs, rubs, or gallops  ABDOMEN: Soft, Nontender, Nondistended; Bowel sounds present, no rebound   EXTREMITIES:  2+ Peripheral Pulses, No clubbing, cyanosis, or edema  GENITOURINARY:   SKIN: No rashes or lesions  BACK: no pressor sore   NERVOUS SYSTEM:  Alert & Oriented X3, Good concentration  PSYCH: normal affect     LABS:                         15.5   10.8  )-----------( 396      ( 23 Aug 2017 07:29 )             42.5     08-23    141  |  106  |  27<H>  ----------------------------<  151<H>  4.6   |  25  |  0.97    Ca    8.4<L>      23 Aug 2017 07:29  Phos  3.3     08-23  Mg     2.9     08-23    TPro  7.0  /  Alb  2.8<L>  /  TBili  1.5<H>  /  DBili  x   /  AST  91<H>  /  ALT  123<H>  /  AlkPhos  87  08-22    LIVER FUNCTIONS - ( 22 Aug 2017 07:45 )  Alb: 2.8 g/dL / Pro: 7.0 gm/dL / ALK PHOS: 87 U/L / ALT: 123 U/L / AST: 91 U/L / GGT: x                                                 Radiology: 67 y/o M w hx of DM, htn, presents w fever and abd pain x 1 week; reports the pain is in bilateral lower quadrant; denies vomiting; denies diarrhea; denies urinary symptoms; + cough; denies CP/SOB; denies lower ext swelling; no headache or neck stiffness (12 Aug 2017 15:51)  here last high tempt on 8/13; then 100.8 on 8/14  now afebrile x > 1 week   main issues is left weakness      Allergies    No Known Allergies    Intolerances        MEDICATIONS  (STANDING):  aspirin enteric coated 81 milliGRAM(s) Oral daily  ATENolol  Tablet 50 milliGRAM(s) Oral daily  insulin lispro (HumaLOG) corrective regimen sliding scale   SubCutaneous three times a day before meals  dextrose 5%. 1000 milliLiter(s) (50 mL/Hr) IV Continuous <Continuous>  dextrose 50% Injectable 12.5 Gram(s) IV Push once  dextrose 50% Injectable 25 Gram(s) IV Push once  dextrose 50% Injectable 25 Gram(s) IV Push once  lidocaine   Patch 1 Patch Transdermal daily  buPROPion XL . 300 milliGRAM(s) Oral daily  meropenem IVPB 500 milliGRAM(s) IV Intermittent every 8 hours  rivaroxaban 20 milliGRAM(s) Oral <User Schedule>  losartan 100 milliGRAM(s) Oral daily  pantoprazole    Tablet 40 milliGRAM(s) Oral before breakfast  dextrose 5% + sodium chloride 0.45%. 1000 milliLiter(s) (50 mL/Hr) IV Continuous <Continuous>  potassium acid phosphate/sodium acid phosphate tablet (K-PHOS No. 2) 1 Tablet(s) Oral four times a day with meals    MEDICATIONS  (PRN):  dextrose Gel 1 Dose(s) Oral once PRN Blood Glucose LESS THAN 70 milliGRAM(s)/deciliter  glucagon  Injectable 1 milliGRAM(s) IntraMuscular once PRN Glucose LESS THAN 70 milligrams/deciliter  acetaminophen   Tablet 650 milliGRAM(s) Oral every 6 hours PRN For Temp greater than 38 C (100.4 F)  diphenhydrAMINE   Capsule 25 milliGRAM(s) Oral once PRN Insomnia  ondansetron Injectable 4 milliGRAM(s) IV Push every 6 hours PRN Nausea and/or Vomiting  benzocaine 15 mG/menthol 3.6 mG Lozenge 1 Lozenge Oral every 6 hours PRN Sore Throat      REVIEW OF SYSTEMS:    CONSTITUTIONAL: No fever, chills, weight loss, or fatigue  HEENT: No sore throat, runny nose, ear ache  RESPIRATORY: No cough, wheezing, No shortness of breath  CARDIOVASCULAR: No chest pain, palpitations, dizziness  GASTROINTESTINAL: No abdominal pain. No nausea, vomiting, diarrhea  GENITOURINARY: No dysuria, increase frequency, hematuria, or incontinence  NEUROLOGICAL: No headaches, memory loss, loss of strength, numbness, or tremors,   left weakness is better  EXTREMITY: No pedal edema BLE  SKIN: No itching, burning, rashes, or lesions     VITAL SIGNS:  T(C): 36.3 (08-23-17 @ 16:27), Max: 37 (08-23-17 @ 11:54)  T(F): 97.4 (08-23-17 @ 16:27), Max: 98.6 (08-23-17 @ 11:54)  HR: 68 (08-23-17 @ 16:27) (62 - 68)  BP: 122/88 (08-23-17 @ 16:27) (122/88 - 143/80)  RR: 17 (08-23-17 @ 16:27) (16 - 18)  SpO2: 95% (08-23-17 @ 16:27) (95% - 96%)  Wt(kg): --    PHYSICAL EXAM:    GENERAL: not in any distress  HEENT: Neck is supple, normocephalic, atraumatic   CHEST/LUNG: Clear to percussion bilaterally; No rales, rhonchi, wheezing  HEART: Regular rate and rhythm; No murmurs, rubs, or gallops  ABDOMEN: Soft, Nontender, Nondistended; Bowel sounds present, no rebound   EXTREMITIES:  2+ Peripheral Pulses, No clubbing, cyanosis, or edema  SKIN: No rashes or lesions  BACK: no pressor sore   NERVOUS SYSTEM:  Alert & Oriented X3, Good concentration  left weakness is better  PSYCH: normal affect     LABS:                         15.5   10.8  )-----------( 396      ( 23 Aug 2017 07:29 )             42.5     08-23    141  |  106  |  27<H>  ----------------------------<  151<H>  4.6   |  25  |  0.97    Ca    8.4<L>      23 Aug 2017 07:29  Phos  3.3     08-23  Mg     2.9     08-23    TPro  7.0  /  Alb  2.8<L>  /  TBili  1.5<H>  /  DBili  x   /  AST  91<H>  /  ALT  123<H>  /  AlkPhos  87  08-22    LIVER FUNCTIONS - ( 22 Aug 2017 07:45 )  Alb: 2.8 g/dL / Pro: 7.0 gm/dL / ALK PHOS: 87 U/L / ALT: 123 U/L / AST: 91 U/L / GGT: x                                                 Radiology:    < from: CT Head No Cont (08.23.17 @ 10:53) >  MPRESSION:    No intracranial hemorrhage, mass, shift or large territorial infarct.   Mild age-related involutional changes and small vessel white matter   ischemic changes. Consider MRI for further evaluation if clinically   indicated.                REJI FINLEY M.D., ATTENDING RADIOLOGIST  This document has beenelectronically signed. Aug 23 2017 11:08AM                < end of copied text >

## 2017-08-23 NOTE — PROGRESS NOTE ADULT - SUBJECTIVE AND OBJECTIVE BOX
INTERVAL HPI/OVERNIGHT EVENTS:      No new complaints.  Discussed with neurologist.  Cat scan with thin slices ordered.      Antimicrobial:  meropenem IVPB 500 milliGRAM(s) IV Intermittent every 8 hours    Cardiovascular:  ATENolol  Tablet 50 milliGRAM(s) Oral daily  losartan 100 milliGRAM(s) Oral daily    Pulmonary:    Hematalogic:  aspirin enteric coated 81 milliGRAM(s) Oral daily  rivaroxaban 20 milliGRAM(s) Oral <User Schedule>    Other:  insulin lispro (HumaLOG) corrective regimen sliding scale   SubCutaneous three times a day before meals  dextrose 5%. 1000 milliLiter(s) IV Continuous <Continuous>  dextrose Gel 1 Dose(s) Oral once PRN  dextrose 50% Injectable 12.5 Gram(s) IV Push once  dextrose 50% Injectable 25 Gram(s) IV Push once  dextrose 50% Injectable 25 Gram(s) IV Push once  glucagon  Injectable 1 milliGRAM(s) IntraMuscular once PRN  lidocaine   Patch 1 Patch Transdermal daily  acetaminophen   Tablet 650 milliGRAM(s) Oral every 6 hours PRN  buPROPion XL . 300 milliGRAM(s) Oral daily  diphenhydrAMINE   Capsule 25 milliGRAM(s) Oral once PRN  ondansetron Injectable 4 milliGRAM(s) IV Push every 6 hours PRN  benzocaine 15 mG/menthol 3.6 mG Lozenge 1 Lozenge Oral every 6 hours PRN  pantoprazole    Tablet 40 milliGRAM(s) Oral before breakfast  dextrose 5% + sodium chloride 0.45%. 1000 milliLiter(s) IV Continuous <Continuous>  potassium acid phosphate/sodium acid phosphate tablet (K-PHOS No. 2) 1 Tablet(s) Oral four times a day with meals    aspirin enteric coated 81 milliGRAM(s) Oral daily  ATENolol  Tablet 50 milliGRAM(s) Oral daily  insulin lispro (HumaLOG) corrective regimen sliding scale   SubCutaneous three times a day before meals  dextrose 5%. 1000 milliLiter(s) IV Continuous <Continuous>  dextrose Gel 1 Dose(s) Oral once PRN  dextrose 50% Injectable 12.5 Gram(s) IV Push once  dextrose 50% Injectable 25 Gram(s) IV Push once  dextrose 50% Injectable 25 Gram(s) IV Push once  glucagon  Injectable 1 milliGRAM(s) IntraMuscular once PRN  lidocaine   Patch 1 Patch Transdermal daily  acetaminophen   Tablet 650 milliGRAM(s) Oral every 6 hours PRN  buPROPion XL . 300 milliGRAM(s) Oral daily  meropenem IVPB 500 milliGRAM(s) IV Intermittent every 8 hours  rivaroxaban 20 milliGRAM(s) Oral <User Schedule>  diphenhydrAMINE   Capsule 25 milliGRAM(s) Oral once PRN  ondansetron Injectable 4 milliGRAM(s) IV Push every 6 hours PRN  benzocaine 15 mG/menthol 3.6 mG Lozenge 1 Lozenge Oral every 6 hours PRN  losartan 100 milliGRAM(s) Oral daily  pantoprazole    Tablet 40 milliGRAM(s) Oral before breakfast  dextrose 5% + sodium chloride 0.45%. 1000 milliLiter(s) IV Continuous <Continuous>  potassium acid phosphate/sodium acid phosphate tablet (K-PHOS No. 2) 1 Tablet(s) Oral four times a day with meals    Drug Dosing Weight  Height (cm): 182.88 (12 Aug 2017 10:02)  Weight (kg): 83.9 (12 Aug 2017 18:00)  BMI (kg/m2): 25.1 (12 Aug 2017 18:00)  BSA (m2): 2.06 (12 Aug 2017 18:00)        SCALES: [ ] YES [ ] NO    DATE INSERTED:  REMOVE:  [ ] YES [ ] NO  EXPLAIN:      PAST MEDICAL & SURGICAL HISTORY:  Diabetes  HTN (hypertension)      REVIEW OF SYSTEMS:    CONSTITUTIONAL: No fever, weight loss, or fatigue  EYES: No eye pain, visual disturbances, or discharge  ENMT:  No difficulty hearing, tinnitus, vertigo; No sinus or throat pain  NECK: No pain or stiffness  BREASTS: No pain, masses, or nipple discharge  RESPIRATORY: No cough, wheezing, chills or hemoptysis; No shortness of breath  CARDIOVASCULAR: No chest pain, palpitations, dizziness, or leg swelling  GASTROINTESTINAL: No abdominal or epigastric pain. No nausea, vomiting, or hematemesis; No diarrhea or constipation. No melena or hematochezia.  GENITOURINARY: No dysuria, frequency, hematuria, or incontinence  NEUROLOGICAL: No headaches, memory loss, loss of strength, numbness, or tremors  SKIN: No itching, burning, rashes, or lesions   LYMPH NODES: No enlarged glands  ENDOCRINE: No heat or cold intolerance; No hair loss  MUSCULOSKELETAL: No joint pain or swelling; No muscle, back, or extremity pain  PSYCHIATRIC: No depression, anxiety, mood swings, or difficulty sleeping  HEME/LYMPH: No easy bruising, or bleeding gums  ALLERGY AND IMMUNOLOGIC: No hives or eczema      T(C): 36.9 (08-23-17 @ 05:18), Max: 37.1 (08-22-17 @ 10:57)  HR: 68 (08-23-17 @ 05:18) (65 - 76)  BP: 143/80 (08-23-17 @ 05:18) (118/89 - 143/94)  RR: 16 (08-23-17 @ 05:18) (16 - 22)  SpO2: 96% (08-23-17 @ 05:18) (94% - 96%)  Wt(kg): --        I&O's Detail    22 Aug 2017 07:01  -  23 Aug 2017 07:00  --------------------------------------------------------  IN:    dextrose 5% + sodium chloride 0.45% with potassium chloride 20 mEq/L: 200 mL    dextrose 5% + sodium chloride 0.45%.: 1000 mL    Oral Fluid: 690 mL    Solution: 200 mL  Total IN: 2090 mL    OUT:    Voided: 1000 mL  Total OUT: 1000 mL    Total NET: 1090 mL            PHYSICAL EXAM:    GENERAL: NAD, well-groomed, well-developed  HEAD:  Atraumatic, Normocephalic  EYES: EOMI, PERRLA, conjunctiva and sclera clear  ENMT: No tonsillar erythema, exudates, or enlargement; Moist mucous membranes, Good dentition, No lesions  NECK: Supple, No JVD, Normal thyroid  NERVOUS SYSTEM:  Alert & Oriented X3, Good concentration; Motor Strength 5/5 B/L upper and lower extremities; DTRs 2+ intact and symmetric  CHEST/LUNG: Clear to percussion bilaterally; No rales, rhonchi, wheezing, or rubs  HEART: Regular rate and rhythm; No murmurs, rubs, or gallops  ABDOMEN: Soft, Nontender, Nondistended; Bowel sounds present  EXTREMITIES:  2+ Peripheral Pulses, No clubbing, cyanosis, or edema  LYMPH: No lymphadenopathy noted  SKIN: No rashes or lesions      LABS:  CBC Full  -  ( 23 Aug 2017 07:29 )  WBC Count : 10.8 K/uL  Hemoglobin : 15.5 g/dL  Hematocrit : 42.5 %  Platelet Count - Automated : 396 K/uL  Mean Cell Volume : 89.8 fl  Mean Cell Hemoglobin : 32.7 pg  Mean Cell Hemoglobin Concentration : 36.4 gm/dL  Auto Neutrophil # : x  Auto Lymphocyte # : x  Auto Monocyte # : x  Auto Eosinophil # : x  Auto Basophil # : x  Auto Neutrophil % : x  Auto Lymphocyte % : x  Auto Monocyte % : x  Auto Eosinophil % : x  Auto Basophil % : x    08-23    141  |  106  |  27<H>  ----------------------------<  151<H>  4.6   |  25  |  0.97    Ca    8.4<L>      23 Aug 2017 07:29  Phos  3.3     08-23  Mg     2.9     08-23    TPro  7.0  /  Alb  2.8<L>  /  TBili  1.5<H>  /  DBili  x   /  AST  91<H>  /  ALT  123<H>  /  AlkPhos  87  08-22            RADIOLOGY & ADDITIONAL STUDIES:

## 2017-08-23 NOTE — PROGRESS NOTE ADULT - SUBJECTIVE AND OBJECTIVE BOX
Subjective Complaints:  Historian:     Complaining of left sided weakness .    REVIEW OF SYSTEMS:  Eyes:  Good vision, no reported pain  ENT:  No sore throat, pain, runny nose, dysphagia  CV:  No pain, palpitatioins, hypo/hypertension  Resp:  No dyspnea, cough, tachypnea, wheezing  GI:  No pain, nausea, vomiting, diarrhea, constipatiion  Muscle:  No pain, weakness  Neuro:  Left sided weakness, tingling,  Psych:  No fatigue, insomnia, mood problems, depression  Endocrine:  No polyuria, polydypsia, cold/heat intolerance    Vital Signs Last 24 Hrs  T(C): 36.9 (23 Aug 2017 05:18), Max: 37.1 (22 Aug 2017 10:57)  T(F): 98.4 (23 Aug 2017 05:18), Max: 98.7 (22 Aug 2017 10:57)  HR: 68 (23 Aug 2017 05:18) (65 - 76)  BP: 143/80 (23 Aug 2017 05:18) (118/89 - 143/94)  BP(mean): --  RR: 16 (23 Aug 2017 05:18) (16 - 22)  SpO2: 96% (23 Aug 2017 05:18) (94% - 96%)    GENERAL PHYSICAL EXAM:  General:  Appears stated age, well-groomed, well-nourished, no distress  HEENT:  NC/AT, patent nares w/ pink mucosa, OP clear w/o lesions, PERRL, EOMI, conjunctivae clear, no thyromegaly, nodules, adenopathy, no JVD  Chest:  Full & symmetric excursion, no increased effort, breath sounds clear  Cardiovascular:  Regular rhythm, S1, S2, no murmur/rub/S3/S4, no carotid/femoral/abdominal bruit, radial/pedal pulses 2+, no edema  Abdomen:  Soft, non-tender, non-distended, normoactive bowel sounds, no HSM  Extremities:  Gait & station:   Digits:   Nails:   Joints, Bones, Muscles:   ROM:   Stability:  Skin:  No rash/erythema/ecchymoses/petechiae/wounds/abscess/warm/dry  Musculoskeletal:  Full ROM in all joints w/o swelling/tenderness/effusion    NEUROLOGICAL EXAM:  HENT:  Normocephalic head; atraumatic head.  Neck supple.  ENT: normal looking.  Mental State:    Alert.  Fully oriented to person, place and date.  Coherent.  Speech clear and intact.  Cooperative.  Responds appropriately.    Cranial Nerves:  II-XII:   Pupils round and reactive to light and accommodation.  Extraocular movements full.  Visual fields full (no homonymous hemianopsia).  Visual acuity wnl.  Facial symmetry intact.  Tongue midline.  Motor Functions:  Left sided weakness1-2/5 in upper and 3/5 in lower ,right 5/5  Sensory Functions:   Intact to touch and pinprick to face and extremities.    Reflexes:  Deep tendon reflexes normoactive to biceps, knees and ankles.  Babinski absent (present).  Cerebellar Testing:    Finger to nose intact.  Nystagmus absent.  Neurovascular: Carotid auscultation full without bruits.      LABS:                        15.5   10.8  )-----------( 396      ( 23 Aug 2017 07:29 )             42.5     08-23    141  |  106  |  27<H>  ----------------------------<  151<H>  4.6   |  25  |  0.97    Ca    8.4<L>      23 Aug 2017 07:29  Phos  3.3     08-23  Mg     2.9     08-23    TPro  7.0  /  Alb  2.8<L>  /  TBili  1.5<H>  /  DBili  x   /  AST  91<H>  /  ALT  123<H>  /  AlkPhos  87  08-22            RADIOLOGY & ADDITIONAL STUDIES:    dextrose 5% + sodium chloride 0.45%.: Solution, 1000 milliLiter(s) infuse at 50 mL/Hr  Provider's Contact #: [phone] (08-22 @ 11:40)  potassium acid phosphate/sodium acid phosphate tablet (K-PHOS No. 2):   1 Tablet(s), Oral, four times a day with meals  Administration Instructions: *Each tablet supplies 250 mG of Phosphorus* (08-22 @ 20:45)  potassium acid phosphate/sodium acid phosphate tablet (K-PHOS No. 2):   1 Tablet(s), Oral, four times a day with meals  Administration Instructions: *Each tablet supplies 250 mG of Phosphorus*  Provider's Contact #: [phone] (08-22 @ 21:18)  potassium acid phosphate/sodium acid phosphate tablet (K-PHOS No. 2):   1 Tablet(s), Oral, four times a day with meals, Stop After 2 Days  Administration Instructions: *Each tablet supplies 250 mG of Phosphorus*  Provider's Contact #: [phone] (08-22 @ 21:20)  Complete Blood Count: AM Sched. Collection: 23-Aug-2017 05:00  Cancel Reason: Dup Cancel (08-23 @ 00:01)  CT Head No Cont: Urgent   Indication: cva  Transport: [facility]  Provider's Contact #: [phone] (08-23 @ 08:57)    Dx Clinical cva   Plan : repeat head ct this am ,after test patient can be transferred to rehab on statin  and asa      Medications:  Statin and ASA

## 2017-08-23 NOTE — PROGRESS NOTE ADULT - SUBJECTIVE AND OBJECTIVE BOX
Patient is a 66y old  Male who presents with a chief complaint of fever, abdominal pain (12 Aug 2017 15:51)        PAST MEDICAL & SURGICAL HISTORY:  Diabetes  HTN (hypertension)      Summary of admission HPI: CVA                PREVIOUS DIAGNOSTIC TESTING:      ECHO  FINDINGS:    STRESS  FINDINGS:    CATHETERIZATION  FINDINGS:    ELECTROPHYSIOLOGY STUDY  FINDINGS:    CAROTID ULTRASOUND:  FINDINGS    VENOUS DUPLEX SCAN:  FINDINGS:    CHEST CT PULMONARY ANGIO with IV Contrast:  FINDINGS:  MEDICATIONS  (STANDING):  aspirin enteric coated 81 milliGRAM(s) Oral daily  ATENolol  Tablet 50 milliGRAM(s) Oral daily  insulin lispro (HumaLOG) corrective regimen sliding scale   SubCutaneous three times a day before meals  dextrose 5%. 1000 milliLiter(s) (50 mL/Hr) IV Continuous <Continuous>  dextrose 50% Injectable 12.5 Gram(s) IV Push once  dextrose 50% Injectable 25 Gram(s) IV Push once  dextrose 50% Injectable 25 Gram(s) IV Push once  lidocaine   Patch 1 Patch Transdermal daily  buPROPion XL . 300 milliGRAM(s) Oral daily  meropenem IVPB 500 milliGRAM(s) IV Intermittent every 8 hours  rivaroxaban 20 milliGRAM(s) Oral <User Schedule>  losartan 100 milliGRAM(s) Oral daily  pantoprazole    Tablet 40 milliGRAM(s) Oral before breakfast  dextrose 5% + sodium chloride 0.45%. 1000 milliLiter(s) (50 mL/Hr) IV Continuous <Continuous>  potassium acid phosphate/sodium acid phosphate tablet (K-PHOS No. 2) 1 Tablet(s) Oral four times a day with meals    MEDICATIONS  (PRN):  dextrose Gel 1 Dose(s) Oral once PRN Blood Glucose LESS THAN 70 milliGRAM(s)/deciliter  glucagon  Injectable 1 milliGRAM(s) IntraMuscular once PRN Glucose LESS THAN 70 milligrams/deciliter  acetaminophen   Tablet 650 milliGRAM(s) Oral every 6 hours PRN For Temp greater than 38 C (100.4 F)  diphenhydrAMINE   Capsule 25 milliGRAM(s) Oral once PRN Insomnia  ondansetron Injectable 4 milliGRAM(s) IV Push every 6 hours PRN Nausea and/or Vomiting  benzocaine 15 mG/menthol 3.6 mG Lozenge 1 Lozenge Oral every 6 hours PRN Sore Throat      FAMILY HISTORY:      SOCIAL HISTORY:    CIGARETTES:    ALCOHOL:    REVIEW OF SYSTEMS:    CONSTITUTIONAL: No fever, weight loss, chills, shakes, or fatigue  EYES: No eye pain, visual disturbances, or discharge  ENMT:  No difficulty hearing, tinnitus, vertigo; No sinus or throat pain  NECK: No pain or stiffness  BREASTS: No pain, masses, or nipple discharge  RESPIRATORY: No cough, wheezing, hemoptysis, or shortness of breath  CARDIOVASCULAR: No chest pain, dyspnea, palpitations, dizziness, syncope, paroxysmal nocturnal dyspnea, orthopnea, or arm or leg swelling  GASTROINTESTINAL: No abdominal  or epigastric pain, nausea, vomiting, hematemesis, diarrhea, constipation, melena or bright red blood.  GENITOURINARY: No dysuria, nocturia, hematuria, or urinary incontinence  NEUROLOGICAL: No headaches, memory loss, slurred speech, limb weakness, loss of strength, numbness, or tremors  SKIN: No itching, burning, rashes, or lesions   LYMPH NODES: No enlarged glands  ENDOCRINE: No heat or cold intolerance, or hair loss  MUSCULOSKELETAL: No joint pain or swelling, muscle, back, or extremity pain  PSYCHIATRIC: No depression, anxiety, or difficulty sleeping  HEME/LYMPH: No easy bruising or bleeding gums  ALLERY AND IMMUNOLOGIC: No hives or rash.      Vital Signs Last 24 Hrs  T(C): 36.3 (23 Aug 2017 16:27), Max: 37 (23 Aug 2017 11:54)  T(F): 97.4 (23 Aug 2017 16:27), Max: 98.6 (23 Aug 2017 11:54)  HR: 68 (23 Aug 2017 16:27) (62 - 68)  BP: 122/88 (23 Aug 2017 16:27) (122/88 - 143/80)  BP(mean): --  RR: 17 (23 Aug 2017 16:27) (16 - 18)  SpO2: 95% (23 Aug 2017 16:27) (95% - 96%)    PHYSICAL EXAM:    GENERAL: In no apparent distress, well nourished, and hydrated.  HEAD:  Atraumatic, Normocephalic  EYES: EOMI, PERRLA, conjunctiva and sclera clear  ENMT: No tonsillar erythema, exudates, or enlargement; Moist mucous membranes, Good dentition, No lesions  NECK: Supple and normal thyroid.  No JVD or carotid bruit.  Carotid pulse is 2+ bilaterally.  HEART: Regular rate and rhythm; No murmurs, rubs, or gallops.  PULMONARY: Clear to auscultation and perfusion.  No rales, wheezing, or rhonchi bilaterally.  ABDOMEN: Soft, Nontender, Nondistended; Bowel sounds present  EXTREMITIES:  2+ Peripheral Pulses, No clubbing, cyanosis, or edema  LYMPH: No lymphadenopathy noted  NEUROLOGICAL: Grossly nonfocal          INTERPRETATION OF TELEMETRY:    ECG:    I&O's Detail    22 Aug 2017 07:  -  23 Aug 2017 07:00  --------------------------------------------------------  IN:    dextrose 5% + sodium chloride 0.45% with potassium chloride 20 mEq/L: 200 mL    dextrose 5% + sodium chloride 0.45%.: 1000 mL    Oral Fluid: 690 mL    Solution: 200 mL  Total IN: 2090 mL    OUT:    Voided: 1000 mL  Total OUT: 1000 mL    Total NET: 1090 mL      23 Aug 2017 07:  -  23 Aug 2017 21:31  --------------------------------------------------------  IN:    Oral Fluid: 400 mL  Total IN: 400 mL    OUT:  Total OUT: 0 mL    Total NET: 400 mL          LABS:                        15.5   10.8  )-----------( 396      ( 23 Aug 2017 07:29 )             42.5     08-    141  |  106  |  27<H>  ----------------------------<  151<H>  4.6   |  25  |  0.97    Ca    8.4<L>      23 Aug 2017 07:29  Phos  3.3     08-  Mg     2.9         TPro  7.0  /  Alb  2.8<L>  /  TBili  1.5<H>  /  DBili  x   /  AST  91<H>  /  ALT  123<H>  /  AlkPhos  87  08-            BNP  I&O's Detail    22 Aug 2017 07:  -  23 Aug 2017 07:00  --------------------------------------------------------  IN:    dextrose 5% + sodium chloride 0.45% with potassium chloride 20 mEq/L: 200 mL    dextrose 5% + sodium chloride 0.45%.: 1000 mL    Oral Fluid: 690 mL    Solution: 200 mL  Total IN: 2090 mL    OUT:    Voided: 1000 mL  Total OUT: 1000 mL    Total NET: 1090 mL      23 Aug 2017 07:01  -  23 Aug 2017 21:31  --------------------------------------------------------  IN:    Oral Fluid: 400 mL  Total IN: 400 mL    OUT:  Total OUT: 0 mL    Total NET: 400 mL        Daily     Daily Weight in k.4 (23 Aug 2017 05:18)    RADIOLOGY & ADDITIONAL STUDIES:

## 2017-08-24 ENCOUNTER — TRANSCRIPTION ENCOUNTER (OUTPATIENT)
Age: 67
End: 2017-08-24

## 2017-08-24 LAB
ANION GAP SERPL CALC-SCNC: 10 MMOL/L — SIGNIFICANT CHANGE UP (ref 5–17)
BUN SERPL-MCNC: 23 MG/DL — SIGNIFICANT CHANGE UP (ref 7–23)
CALCIUM SERPL-MCNC: 8.5 MG/DL — SIGNIFICANT CHANGE UP (ref 8.5–10.1)
CHLORIDE SERPL-SCNC: 107 MMOL/L — SIGNIFICANT CHANGE UP (ref 96–108)
CO2 SERPL-SCNC: 23 MMOL/L — SIGNIFICANT CHANGE UP (ref 22–31)
CREAT SERPL-MCNC: 0.82 MG/DL — SIGNIFICANT CHANGE UP (ref 0.5–1.3)
GLUCOSE SERPL-MCNC: 161 MG/DL — HIGH (ref 70–99)
HCT VFR BLD CALC: 43.5 % — SIGNIFICANT CHANGE UP (ref 39–50)
HGB BLD-MCNC: 15.6 G/DL — SIGNIFICANT CHANGE UP (ref 13–17)
MCHC RBC-ENTMCNC: 31.5 PG — SIGNIFICANT CHANGE UP (ref 27–34)
MCHC RBC-ENTMCNC: 35.8 GM/DL — SIGNIFICANT CHANGE UP (ref 32–36)
MCV RBC AUTO: 88 FL — SIGNIFICANT CHANGE UP (ref 80–100)
PLATELET # BLD AUTO: 398 K/UL — SIGNIFICANT CHANGE UP (ref 150–400)
POTASSIUM SERPL-MCNC: 4.4 MMOL/L — SIGNIFICANT CHANGE UP (ref 3.5–5.3)
POTASSIUM SERPL-SCNC: 4.4 MMOL/L — SIGNIFICANT CHANGE UP (ref 3.5–5.3)
RBC # BLD: 4.94 M/UL — SIGNIFICANT CHANGE UP (ref 4.2–5.8)
RBC # FLD: 12.2 % — SIGNIFICANT CHANGE UP (ref 11–15)
SODIUM SERPL-SCNC: 140 MMOL/L — SIGNIFICANT CHANGE UP (ref 135–145)
WBC # BLD: 11.3 K/UL — HIGH (ref 3.8–10.5)
WBC # FLD AUTO: 11.3 K/UL — HIGH (ref 3.8–10.5)

## 2017-08-24 RX ORDER — SIMVASTATIN 20 MG/1
1 TABLET, FILM COATED ORAL
Qty: 30 | Refills: 0 | OUTPATIENT
Start: 2017-08-24 | End: 2017-09-23

## 2017-08-24 RX ORDER — PANTOPRAZOLE SODIUM 20 MG/1
1 TABLET, DELAYED RELEASE ORAL
Qty: 30 | Refills: 0 | OUTPATIENT
Start: 2017-08-24 | End: 2017-09-23

## 2017-08-24 RX ORDER — RIVAROXABAN 15 MG-20MG
1 KIT ORAL
Qty: 30 | Refills: 0 | OUTPATIENT
Start: 2017-08-24 | End: 2017-09-23

## 2017-08-24 RX ORDER — ATENOLOL 25 MG/1
1 TABLET ORAL
Qty: 30 | Refills: 0 | OUTPATIENT
Start: 2017-08-24 | End: 2017-09-23

## 2017-08-24 RX ADMIN — Medication 1 TABLET(S): at 17:39

## 2017-08-24 RX ADMIN — SODIUM CHLORIDE 50 MILLILITER(S): 9 INJECTION, SOLUTION INTRAVENOUS at 08:05

## 2017-08-24 RX ADMIN — BUPROPION HYDROCHLORIDE 300 MILLIGRAM(S): 150 TABLET, EXTENDED RELEASE ORAL at 12:11

## 2017-08-24 RX ADMIN — Medication 81 MILLIGRAM(S): at 12:13

## 2017-08-24 RX ADMIN — Medication 1: at 12:12

## 2017-08-24 RX ADMIN — PANTOPRAZOLE SODIUM 40 MILLIGRAM(S): 20 TABLET, DELAYED RELEASE ORAL at 08:04

## 2017-08-24 RX ADMIN — RIVAROXABAN 20 MILLIGRAM(S): KIT at 12:10

## 2017-08-24 RX ADMIN — Medication 1: at 08:04

## 2017-08-24 RX ADMIN — LOSARTAN POTASSIUM 100 MILLIGRAM(S): 100 TABLET, FILM COATED ORAL at 05:16

## 2017-08-24 RX ADMIN — ATENOLOL 50 MILLIGRAM(S): 25 TABLET ORAL at 05:17

## 2017-08-24 RX ADMIN — Medication 1: at 17:38

## 2017-08-24 RX ADMIN — LIDOCAINE 1 PATCH: 4 CREAM TOPICAL at 18:24

## 2017-08-24 RX ADMIN — Medication 1 TABLET(S): at 12:10

## 2017-08-24 RX ADMIN — MEROPENEM 200 MILLIGRAM(S): 1 INJECTION INTRAVENOUS at 17:38

## 2017-08-24 RX ADMIN — Medication 1 TABLET(S): at 08:06

## 2017-08-24 RX ADMIN — LIDOCAINE 1 PATCH: 4 CREAM TOPICAL at 12:10

## 2017-08-24 RX ADMIN — MEROPENEM 200 MILLIGRAM(S): 1 INJECTION INTRAVENOUS at 05:16

## 2017-08-24 RX ADMIN — LIDOCAINE 1 PATCH: 4 CREAM TOPICAL at 01:57

## 2017-08-24 RX ADMIN — MEROPENEM 200 MILLIGRAM(S): 1 INJECTION INTRAVENOUS at 21:19

## 2017-08-24 NOTE — DISCHARGE NOTE ADULT - CARE PROVIDER_API CALL
Pierre Raymond), Internal Medicine  [address] [place] [address]  Phone: [phone]  Fax: [phone]

## 2017-08-24 NOTE — DISCHARGE NOTE ADULT - PATIENT PORTAL LINK FT
“You can access the [employer] Patient Portal, offered by [facility], by registering with the following website: [email or web address]”

## 2017-08-24 NOTE — PROGRESS NOTE ADULT - SUBJECTIVE AND OBJECTIVE BOX
Patient is a 66y old  Male who presents with a chief complaint of fever, abdominal pain (12 Aug 2017 15:51)        PAST MEDICAL & SURGICAL HISTORY:  Diabetes  HTN (hypertension)      Summary of admission HPI: NSTEMI CVA Afib                PREVIOUS DIAGNOSTIC TESTING:      ECHO  FINDINGS:    STRESS  FINDINGS:    CATHETERIZATION  FINDINGS:    ELECTROPHYSIOLOGY STUDY  FINDINGS:    CAROTID ULTRASOUND:  FINDINGS    VENOUS DUPLEX SCAN:  FINDINGS:    CHEST CT PULMONARY ANGIO with IV Contrast:  FINDINGS:  MEDICATIONS  (STANDING):  aspirin enteric coated 81 milliGRAM(s) Oral daily  ATENolol  Tablet 50 milliGRAM(s) Oral daily  insulin lispro (HumaLOG) corrective regimen sliding scale   SubCutaneous three times a day before meals  dextrose 5%. 1000 milliLiter(s) (50 mL/Hr) IV Continuous <Continuous>  dextrose 50% Injectable 12.5 Gram(s) IV Push once  dextrose 50% Injectable 25 Gram(s) IV Push once  dextrose 50% Injectable 25 Gram(s) IV Push once  lidocaine   Patch 1 Patch Transdermal daily  buPROPion XL . 300 milliGRAM(s) Oral daily  meropenem IVPB 500 milliGRAM(s) IV Intermittent every 8 hours  rivaroxaban 20 milliGRAM(s) Oral <User Schedule>  losartan 100 milliGRAM(s) Oral daily  pantoprazole    Tablet 40 milliGRAM(s) Oral before breakfast  dextrose 5% + sodium chloride 0.45%. 1000 milliLiter(s) (50 mL/Hr) IV Continuous <Continuous>    MEDICATIONS  (PRN):  dextrose Gel 1 Dose(s) Oral once PRN Blood Glucose LESS THAN 70 milliGRAM(s)/deciliter  glucagon  Injectable 1 milliGRAM(s) IntraMuscular once PRN Glucose LESS THAN 70 milligrams/deciliter  acetaminophen   Tablet 650 milliGRAM(s) Oral every 6 hours PRN For Temp greater than 38 C (100.4 F)  diphenhydrAMINE   Capsule 25 milliGRAM(s) Oral once PRN Insomnia  ondansetron Injectable 4 milliGRAM(s) IV Push every 6 hours PRN Nausea and/or Vomiting  benzocaine 15 mG/menthol 3.6 mG Lozenge 1 Lozenge Oral every 6 hours PRN Sore Throat      FAMILY HISTORY:      SOCIAL HISTORY:    CIGARETTES:    ALCOHOL:    REVIEW OF SYSTEMS:    CONSTITUTIONAL: No fever, weight loss, chills, shakes, or fatigue  EYES: No eye pain, visual disturbances, or discharge  ENMT:  No difficulty hearing, tinnitus, vertigo; No sinus or throat pain  NECK: No pain or stiffness  BREASTS: No pain, masses, or nipple discharge  RESPIRATORY: No cough, wheezing, hemoptysis, or shortness of breath  CARDIOVASCULAR: No chest pain, dyspnea, palpitations, dizziness, syncope, paroxysmal nocturnal dyspnea, orthopnea, or arm or leg swelling  GASTROINTESTINAL: No abdominal  or epigastric pain, nausea, vomiting, hematemesis, diarrhea, constipation, melena or bright red blood.  GENITOURINARY: No dysuria, nocturia, hematuria, or urinary incontinence  NEUROLOGICAL: No headaches, memory loss, slurred speech, limb weakness, loss of strength, numbness, or tremors  SKIN: No itching, burning, rashes, or lesions   LYMPH NODES: No enlarged glands  ENDOCRINE: No heat or cold intolerance, or hair loss  MUSCULOSKELETAL: No joint pain or swelling, muscle, back, or extremity pain  PSYCHIATRIC: No depression, anxiety, or difficulty sleeping  HEME/LYMPH: No easy bruising or bleeding gums  ALLERY AND IMMUNOLOGIC: No hives or rash.      Vital Signs Last 24 Hrs  T(C): 36.1 (24 Aug 2017 16:58), Max: 37.3 (24 Aug 2017 16:56)  T(F): 97 (24 Aug 2017 16:58), Max: 99.2 (24 Aug 2017 16:56)  HR: 84 (24 Aug 2017 16:58) (64 - 84)  BP: 107/77 (24 Aug 2017 16:58) (107/77 - 152/90)  BP(mean): --  RR: 16 (24 Aug 2017 16:58) (16 - 18)  SpO2: 100% (24 Aug 2017 16:58) (95% - 100%)    PHYSICAL EXAM:    GENERAL: In no apparent distress, well nourished, and hydrated.  HEAD:  Atraumatic, Normocephalic  EYES: EOMI, PERRLA, conjunctiva and sclera clear  ENMT: No tonsillar erythema, exudates, or enlargement; Moist mucous membranes, Good dentition, No lesions  NECK: Supple and normal thyroid.  No JVD or carotid bruit.  Carotid pulse is 2+ bilaterally.  HEART: Regular rate and rhythm; No murmurs, rubs, or gallops.  PULMONARY: Clear to auscultation and perfusion.  No rales, wheezing, or rhonchi bilaterally.  ABDOMEN: Soft, Nontender, Nondistended; Bowel sounds present  EXTREMITIES:  2+ Peripheral Pulses, No clubbing, cyanosis, or edema  LYMPH: No lymphadenopathy noted  NEUROLOGICAL: Grossly nonfocal          INTERPRETATION OF TELEMETRY:    ECG:    I&O's Detail    23 Aug 2017 07:01  -  24 Aug 2017 07:00  --------------------------------------------------------  IN:    dextrose 5% + sodium chloride 0.45%.: 500 mL    Oral Fluid: 518 mL    Solution: 100 mL  Total IN: 1118 mL    OUT:    Voided: 1050 mL  Total OUT: 1050 mL    Total NET: 68 mL      24 Aug 2017 07:01  -  24 Aug 2017 21:32  --------------------------------------------------------  IN:    Oral Fluid: 600 mL  Total IN: 600 mL    OUT:  Total OUT: 0 mL    Total NET: 600 mL          LABS:                        15.6   11.3  )-----------( 398      ( 24 Aug 2017 08:29 )             43.5     08-24    140  |  107  |  23  ----------------------------<  161<H>  4.4   |  23  |  0.82    Ca    8.5      24 Aug 2017 08:29  Phos  3.3     08-23  Mg     2.9     08-23              BNP  I&O's Detail    23 Aug 2017 07:01  -  24 Aug 2017 07:00  --------------------------------------------------------  IN:    dextrose 5% + sodium chloride 0.45%.: 500 mL    Oral Fluid: 518 mL    Solution: 100 mL  Total IN: 1118 mL    OUT:    Voided: 1050 mL  Total OUT: 1050 mL    Total NET: 68 mL      24 Aug 2017 07:01  -  24 Aug 2017 21:32  --------------------------------------------------------  IN:    Oral Fluid: 600 mL  Total IN: 600 mL    OUT:  Total OUT: 0 mL    Total NET: 600 mL        Daily     Daily     RADIOLOGY & ADDITIONAL STUDIES:

## 2017-08-24 NOTE — DISCHARGE NOTE ADULT - NSTOBACCOHOTLINE_GEN_A_CS
[facility] Smokers Quitline ([phone]) [facility] Smokers Quitline ([phone])

## 2017-08-24 NOTE — DISCHARGE NOTE ADULT - SECONDARY DIAGNOSIS.
Atrial fibrillation, unspecified type Depressive disorder Essential hypertension Hypophosphatemia NSTEMI (non-ST elevated myocardial infarction) Other specified anxiety disorders

## 2017-08-24 NOTE — DISCHARGE NOTE ADULT - CARE PLAN
Principal Discharge DX:	Cerebrovascular accident (CVA), unspecified mechanism  Goal:	rehab  Instructions for follow-up, activity and diet:	dash/tlc  Secondary Diagnosis:	Atrial fibrillation, unspecified type  Secondary Diagnosis:	Depressive disorder  Secondary Diagnosis:	Essential hypertension  Secondary Diagnosis:	Hypophosphatemia  Secondary Diagnosis:	NSTEMI (non-ST elevated myocardial infarction)  Secondary Diagnosis:	Other specified anxiety disorders

## 2017-08-24 NOTE — DISCHARGE NOTE ADULT - MEDICATION SUMMARY - MEDICATIONS TO TAKE
I will START or STAY ON the medications listed below when I get home from the hospital:    aspirin 81 mg oral tablet  -- 1 tab(s) by mouth once a day  -- Indication: For CVA (cerebral vascular accident)    Micardis 80 mg oral tablet  -- 1 tab(s) by mouth once a day  -- Indication: For HTN (hypertension)    rivaroxaban 20 mg oral tablet  -- 1 tab(s) by mouth once a day (at bedtime)  -- Indication: For Atrial fibrillation    atenolol 50 mg oral tablet  -- 1 tab(s) by mouth once a day  -- Indication: For Atrial fibrillation    Augmentin 875 mg-125 mg oral tablet  -- 1 tab(s) by mouth every 12 hours  -- Finish all this medication unless otherwise directed by prescriber.  Take with food or milk.    -- Indication: For Sepsis    pantoprazole 40 mg oral delayed release tablet  -- 1 tab(s) by mouth once a day (before a meal)  -- Indication: For gerd    Wellbutrin  mg/24 hours oral tablet, extended release  -- 1 tab(s) by mouth every 24 hours  -- Indication: For Depression I will START or STAY ON the medications listed below when I get home from the hospital:    aspirin 81 mg oral tablet  -- 1 tab(s) by mouth once a day  -- Indication: For CVA (cerebral vascular accident)    Micardis 80 mg oral tablet  -- 1 tab(s) by mouth once a day  -- Indication: For HTN (hypertension)    rivaroxaban 20 mg oral tablet  -- 1 tab(s) by mouth once a day (at bedtime)  -- Indication: For Atrial fibrillation    simvastatin 20 mg oral tablet  -- 1 tab(s) by mouth once a day (at bedtime)  -- Avoid grapefruit and grapefruit juice while taking this medication.  Do not take this drug if you are pregnant.  It is very important that you take or use this exactly as directed.  Do not skip doses or discontinue unless directed by your doctor.  Obtain medical advice before taking any non-prescription drugs as some may affect the action of this medication.  Take with food or milk.    -- Indication: For CVA (cerebral vascular accident)    atenolol 50 mg oral tablet  -- 1 tab(s) by mouth once a day  -- Indication: For Atrial fibrillation    Augmentin 875 mg-125 mg oral tablet  -- 1 tab(s) by mouth every 12 hours  -- Finish all this medication unless otherwise directed by prescriber.  Take with food or milk.    -- Indication: For Sepsis    pantoprazole 40 mg oral delayed release tablet  -- 1 tab(s) by mouth once a day (before a meal)  -- Indication: For gerd    Wellbutrin  mg/24 hours oral tablet, extended release  -- 1 tab(s) by mouth every 24 hours  -- Indication: For Depression

## 2017-08-24 NOTE — DISCHARGE NOTE ADULT - HOSPITAL COURSE
65 y/o M w hx of DM, htn, presents w fever and abd pain x 1 week; reports the pain is in bilateral lower quadrant; denies vomiting; denies diarrhea; denies urinary symptoms; + cough; denies CP/SOB; denies lower ext swelling; no headache or neck stiffness.  Patient was seen by the ID team appropriate antibiotic treatment was initiated as source of infection was searched for; patient developed atrial fibrillation, was found to heve elevated troponin necessitating cardiac work up, and at the same time patient was noted with left sided weakness.  He was seen by the neurologist and full neurological work up undertaken.  Patient also had partial small bowel obstruction which was treated conservatively by the surgeons.  Patient determined , as per neurologist, to have clinical CVA.  Patient cleared by surgeons and Neurologist to be discharged.

## 2017-08-24 NOTE — PROGRESS NOTE ADULT - SUBJECTIVE AND OBJECTIVE BOX
Patient seen and examined at bedside in no distress, requesting to be discharged.  Tolerating regular diet. Denies abdominal pain, nausea, vomiting.   +flatus.  Awaiting PT this AM.  Denies fever, chills, chest pain, sob.     Vital Signs Last 24 Hrs  T(F): 97.8 (08-24-17 @ 05:13), Max: 98.6 (08-23-17 @ 11:54)  HR: 82 (08-24-17 @ 05:13)  BP: 152/90 (08-24-17 @ 05:13)  RR: 16 (08-24-17 @ 05:13)  SpO2: 95% (08-24-17 @ 05:13)    GENERAL: Alert, oriented, NAD  CHEST/LUNG: Clear to auscultation bilaterally, respirations nonlabored  HEART: S1S2, Regular rate and rhythm   ABDOMEN: + Bowel sounds, soft, nondistended, nontender  EXTREMITIES:  no calf tenderness, no pedal edema b/l     LABS:                        15.6   11.3  )-----------( 398      ( 24 Aug 2017 08:29 )             43.5     08-23    141  |  106  |  27<H>  ----------------------------<  151<H>  4.6   |  25  |  0.97    Ca    8.4<L>      23 Aug 2017 07:29  Phos  3.3     08-23  Mg     2.9     08-23    Impression: 66 year old male PMH DM, HTN admitted with fever, new AF, left sided weakness, ileus, now with mildly increasing leukocytosis   Plan:  - continue regular diet as tolerated  - ID following  - pain management PRN, incentive spirometer, DVT ppx   - continue medical management and supportive care  - will d/w Dr Hernández

## 2017-08-25 LAB
HCT VFR BLD CALC: 40.4 % — SIGNIFICANT CHANGE UP (ref 39–50)
HGB BLD-MCNC: 14.6 G/DL — SIGNIFICANT CHANGE UP (ref 13–17)
MCHC RBC-ENTMCNC: 32.3 PG — SIGNIFICANT CHANGE UP (ref 27–34)
MCHC RBC-ENTMCNC: 36.1 GM/DL — HIGH (ref 32–36)
MCV RBC AUTO: 89.4 FL — SIGNIFICANT CHANGE UP (ref 80–100)
PLATELET # BLD AUTO: 374 K/UL — SIGNIFICANT CHANGE UP (ref 150–400)
RBC # BLD: 4.52 M/UL — SIGNIFICANT CHANGE UP (ref 4.2–5.8)
RBC # FLD: 12.2 % — SIGNIFICANT CHANGE UP (ref 11–15)
WBC # BLD: 10.8 K/UL — HIGH (ref 3.8–10.5)
WBC # FLD AUTO: 10.8 K/UL — HIGH (ref 3.8–10.5)

## 2017-08-25 RX ADMIN — MEROPENEM 200 MILLIGRAM(S): 1 INJECTION INTRAVENOUS at 13:52

## 2017-08-25 RX ADMIN — PANTOPRAZOLE SODIUM 40 MILLIGRAM(S): 20 TABLET, DELAYED RELEASE ORAL at 06:35

## 2017-08-25 RX ADMIN — RIVAROXABAN 20 MILLIGRAM(S): KIT at 11:56

## 2017-08-25 RX ADMIN — BUPROPION HYDROCHLORIDE 300 MILLIGRAM(S): 150 TABLET, EXTENDED RELEASE ORAL at 11:57

## 2017-08-25 RX ADMIN — ATENOLOL 50 MILLIGRAM(S): 25 TABLET ORAL at 06:35

## 2017-08-25 RX ADMIN — MEROPENEM 200 MILLIGRAM(S): 1 INJECTION INTRAVENOUS at 21:53

## 2017-08-25 RX ADMIN — SODIUM CHLORIDE 50 MILLILITER(S): 9 INJECTION, SOLUTION INTRAVENOUS at 13:53

## 2017-08-25 RX ADMIN — Medication 1: at 08:05

## 2017-08-25 RX ADMIN — Medication 1: at 11:57

## 2017-08-25 RX ADMIN — LIDOCAINE 1 PATCH: 4 CREAM TOPICAL at 11:56

## 2017-08-25 RX ADMIN — LIDOCAINE 1 PATCH: 4 CREAM TOPICAL at 11:57

## 2017-08-25 RX ADMIN — Medication 81 MILLIGRAM(S): at 11:57

## 2017-08-25 RX ADMIN — Medication 1: at 17:33

## 2017-08-25 RX ADMIN — MEROPENEM 200 MILLIGRAM(S): 1 INJECTION INTRAVENOUS at 06:34

## 2017-08-25 RX ADMIN — LOSARTAN POTASSIUM 100 MILLIGRAM(S): 100 TABLET, FILM COATED ORAL at 06:35

## 2017-08-25 NOTE — PROGRESS NOTE ADULT - SUBJECTIVE AND OBJECTIVE BOX
Patient is a 66y old  Male who presents with a chief complaint of fever, abdominal pain (12 Aug 2017 15:51)        PAST MEDICAL & SURGICAL HISTORY:  Diabetes  HTN (hypertension)      Summary of admission HPI: NSTEMI CVA Afib                PREVIOUS DIAGNOSTIC TESTING:      ECHO  FINDINGS:    STRESS  FINDINGS:    CATHETERIZATION  FINDINGS:    ELECTROPHYSIOLOGY STUDY  FINDINGS:    CAROTID ULTRASOUND:  FINDINGS    VENOUS DUPLEX SCAN:  FINDINGS:    CHEST CT PULMONARY ANGIO with IV Contrast:  FINDINGS:  MEDICATIONS  (STANDING):  aspirin enteric coated 81 milliGRAM(s) Oral daily  ATENolol  Tablet 50 milliGRAM(s) Oral daily  insulin lispro (HumaLOG) corrective regimen sliding scale   SubCutaneous three times a day before meals  dextrose 5%. 1000 milliLiter(s) (50 mL/Hr) IV Continuous <Continuous>  dextrose 50% Injectable 12.5 Gram(s) IV Push once  dextrose 50% Injectable 25 Gram(s) IV Push once  dextrose 50% Injectable 25 Gram(s) IV Push once  lidocaine   Patch 1 Patch Transdermal daily  buPROPion XL . 300 milliGRAM(s) Oral daily  meropenem IVPB 500 milliGRAM(s) IV Intermittent every 8 hours  rivaroxaban 20 milliGRAM(s) Oral <User Schedule>  losartan 100 milliGRAM(s) Oral daily  pantoprazole    Tablet 40 milliGRAM(s) Oral before breakfast  dextrose 5% + sodium chloride 0.45%. 1000 milliLiter(s) (50 mL/Hr) IV Continuous <Continuous>    MEDICATIONS  (PRN):  dextrose Gel 1 Dose(s) Oral once PRN Blood Glucose LESS THAN 70 milliGRAM(s)/deciliter  glucagon  Injectable 1 milliGRAM(s) IntraMuscular once PRN Glucose LESS THAN 70 milligrams/deciliter  acetaminophen   Tablet 650 milliGRAM(s) Oral every 6 hours PRN For Temp greater than 38 C (100.4 F)  diphenhydrAMINE   Capsule 25 milliGRAM(s) Oral once PRN Insomnia  ondansetron Injectable 4 milliGRAM(s) IV Push every 6 hours PRN Nausea and/or Vomiting  benzocaine 15 mG/menthol 3.6 mG Lozenge 1 Lozenge Oral every 6 hours PRN Sore Throat      FAMILY HISTORY:      SOCIAL HISTORY:    CIGARETTES:    ALCOHOL:    REVIEW OF SYSTEMS:    CONSTITUTIONAL: No fever, weight loss, chills, shakes, or fatigue  EYES: No eye pain, visual disturbances, or discharge  ENMT:  No difficulty hearing, tinnitus, vertigo; No sinus or throat pain  NECK: No pain or stiffness  BREASTS: No pain, masses, or nipple discharge  RESPIRATORY: No cough, wheezing, hemoptysis, or shortness of breath  CARDIOVASCULAR: No chest pain, dyspnea, palpitations, dizziness, syncope, paroxysmal nocturnal dyspnea, orthopnea, or arm or leg swelling  GASTROINTESTINAL: No abdominal  or epigastric pain, nausea, vomiting, hematemesis, diarrhea, constipation, melena or bright red blood.  GENITOURINARY: No dysuria, nocturia, hematuria, or urinary incontinence  NEUROLOGICAL: No headaches, memory loss, slurred speech, limb weakness, loss of strength, numbness, or tremors  SKIN: No itching, burning, rashes, or lesions   LYMPH NODES: No enlarged glands  ENDOCRINE: No heat or cold intolerance, or hair loss  MUSCULOSKELETAL: No joint pain or swelling, muscle, back, or extremity pain  PSYCHIATRIC: No depression, anxiety, or difficulty sleeping  HEME/LYMPH: No easy bruising or bleeding gums  ALLERY AND IMMUNOLOGIC: No hives or rash.      Vital Signs Last 24 Hrs  T(C): 37.1 (25 Aug 2017 16:42), Max: 37.1 (25 Aug 2017 10:53)  T(F): 98.8 (25 Aug 2017 16:42), Max: 98.8 (25 Aug 2017 10:53)  HR: 65 (25 Aug 2017 16:42) (63 - 76)  BP: 124/84 (25 Aug 2017 16:42) (115/70 - 130/83)  BP(mean): --  RR: 17 (25 Aug 2017 16:42) (17 - 18)  SpO2: 99% (25 Aug 2017 16:42) (98% - 99%)    PHYSICAL EXAM:    GENERAL: In no apparent distress, well nourished, and hydrated.  HEAD:  Atraumatic, Normocephalic  EYES: EOMI, PERRLA, conjunctiva and sclera clear  ENMT: No tonsillar erythema, exudates, or enlargement; Moist mucous membranes, Good dentition, No lesions  NECK: Supple and normal thyroid.  No JVD or carotid bruit.  Carotid pulse is 2+ bilaterally.  HEART: Regular rate and rhythm; No murmurs, rubs, or gallops.  PULMONARY: Clear to auscultation and perfusion.  No rales, wheezing, or rhonchi bilaterally.  ABDOMEN: Soft, Nontender, Nondistended; Bowel sounds present  EXTREMITIES:  2+ Peripheral Pulses, No clubbing, cyanosis, or edema  LYMPH: No lymphadenopathy noted  NEUROLOGICAL: Grossly nonfocal          INTERPRETATION OF TELEMETRY:    ECG:    I&O's Detail    24 Aug 2017 07:01  -  25 Aug 2017 07:00  --------------------------------------------------------  IN:    dextrose 5% + sodium chloride 0.45%.: 600 mL    Oral Fluid: 600 mL    Solution: 200 mL  Total IN: 1400 mL    OUT:    Voided: 1200 mL  Total OUT: 1200 mL    Total NET: 200 mL      25 Aug 2017 07:01  -  25 Aug 2017 21:28  --------------------------------------------------------  IN:    Oral Fluid: 480 mL  Total IN: 480 mL    OUT:    Voided: 300 mL  Total OUT: 300 mL    Total NET: 180 mL          LABS:                        14.6   10.8  )-----------( 374      ( 25 Aug 2017 08:46 )             40.4     08-24    140  |  107  |  23  ----------------------------<  161<H>  4.4   |  23  |  0.82    Ca    8.5      24 Aug 2017 08:29              BNP  I&O's Detail    24 Aug 2017 07:01  -  25 Aug 2017 07:00  --------------------------------------------------------  IN:    dextrose 5% + sodium chloride 0.45%.: 600 mL    Oral Fluid: 600 mL    Solution: 200 mL  Total IN: 1400 mL    OUT:    Voided: 1200 mL  Total OUT: 1200 mL    Total NET: 200 mL      25 Aug 2017 07:01  -  25 Aug 2017 21:28  --------------------------------------------------------  IN:    Oral Fluid: 480 mL  Total IN: 480 mL    OUT:    Voided: 300 mL  Total OUT: 300 mL    Total NET: 180 mL        Daily     Daily     RADIOLOGY & ADDITIONAL STUDIES:

## 2017-08-26 RX ADMIN — LIDOCAINE 1 PATCH: 4 CREAM TOPICAL at 23:20

## 2017-08-26 RX ADMIN — LOSARTAN POTASSIUM 100 MILLIGRAM(S): 100 TABLET, FILM COATED ORAL at 05:21

## 2017-08-26 RX ADMIN — PANTOPRAZOLE SODIUM 40 MILLIGRAM(S): 20 TABLET, DELAYED RELEASE ORAL at 11:32

## 2017-08-26 RX ADMIN — ATENOLOL 50 MILLIGRAM(S): 25 TABLET ORAL at 05:21

## 2017-08-26 RX ADMIN — MEROPENEM 200 MILLIGRAM(S): 1 INJECTION INTRAVENOUS at 14:56

## 2017-08-26 RX ADMIN — LIDOCAINE 1 PATCH: 4 CREAM TOPICAL at 11:33

## 2017-08-26 RX ADMIN — MEROPENEM 200 MILLIGRAM(S): 1 INJECTION INTRAVENOUS at 05:21

## 2017-08-26 RX ADMIN — BUPROPION HYDROCHLORIDE 300 MILLIGRAM(S): 150 TABLET, EXTENDED RELEASE ORAL at 11:32

## 2017-08-26 RX ADMIN — RIVAROXABAN 20 MILLIGRAM(S): KIT at 11:32

## 2017-08-26 RX ADMIN — Medication 81 MILLIGRAM(S): at 11:32

## 2017-08-26 NOTE — PROGRESS NOTE ADULT - SUBJECTIVE AND OBJECTIVE BOX
INTERVAL HPI/OVERNIGHT EVENTS:    Awaiting transfer to rehab      Antimicrobial:  meropenem IVPB 500 milliGRAM(s) IV Intermittent every 8 hours    Cardiovascular:  ATENolol  Tablet 50 milliGRAM(s) Oral daily  losartan 100 milliGRAM(s) Oral daily    Pulmonary:    Hematalogic:  aspirin enteric coated 81 milliGRAM(s) Oral daily  rivaroxaban 20 milliGRAM(s) Oral <User Schedule>    Other:  insulin lispro (HumaLOG) corrective regimen sliding scale   SubCutaneous three times a day before meals  dextrose 5%. 1000 milliLiter(s) IV Continuous <Continuous>  dextrose Gel 1 Dose(s) Oral once PRN  dextrose 50% Injectable 12.5 Gram(s) IV Push once  dextrose 50% Injectable 25 Gram(s) IV Push once  dextrose 50% Injectable 25 Gram(s) IV Push once  glucagon  Injectable 1 milliGRAM(s) IntraMuscular once PRN  lidocaine   Patch 1 Patch Transdermal daily  acetaminophen   Tablet 650 milliGRAM(s) Oral every 6 hours PRN  buPROPion XL . 300 milliGRAM(s) Oral daily  diphenhydrAMINE   Capsule 25 milliGRAM(s) Oral once PRN  ondansetron Injectable 4 milliGRAM(s) IV Push every 6 hours PRN  benzocaine 15 mG/menthol 3.6 mG Lozenge 1 Lozenge Oral every 6 hours PRN  pantoprazole    Tablet 40 milliGRAM(s) Oral before breakfast  dextrose 5% + sodium chloride 0.45%. 1000 milliLiter(s) IV Continuous <Continuous>    aspirin enteric coated 81 milliGRAM(s) Oral daily  ATENolol  Tablet 50 milliGRAM(s) Oral daily  insulin lispro (HumaLOG) corrective regimen sliding scale   SubCutaneous three times a day before meals  dextrose 5%. 1000 milliLiter(s) IV Continuous <Continuous>  dextrose Gel 1 Dose(s) Oral once PRN  dextrose 50% Injectable 12.5 Gram(s) IV Push once  dextrose 50% Injectable 25 Gram(s) IV Push once  dextrose 50% Injectable 25 Gram(s) IV Push once  glucagon  Injectable 1 milliGRAM(s) IntraMuscular once PRN  lidocaine   Patch 1 Patch Transdermal daily  acetaminophen   Tablet 650 milliGRAM(s) Oral every 6 hours PRN  buPROPion XL . 300 milliGRAM(s) Oral daily  meropenem IVPB 500 milliGRAM(s) IV Intermittent every 8 hours  rivaroxaban 20 milliGRAM(s) Oral <User Schedule>  diphenhydrAMINE   Capsule 25 milliGRAM(s) Oral once PRN  ondansetron Injectable 4 milliGRAM(s) IV Push every 6 hours PRN  benzocaine 15 mG/menthol 3.6 mG Lozenge 1 Lozenge Oral every 6 hours PRN  losartan 100 milliGRAM(s) Oral daily  pantoprazole    Tablet 40 milliGRAM(s) Oral before breakfast  dextrose 5% + sodium chloride 0.45%. 1000 milliLiter(s) IV Continuous <Continuous>    Drug Dosing Weight  Height (cm): 182.88 (12 Aug 2017 10:02)  Weight (kg): 83.9 (12 Aug 2017 18:00)  BMI (kg/m2): 25.1 (12 Aug 2017 18:00)  BSA (m2): 2.06 (12 Aug 2017 18:00)        SCALES: [ ] YES [ ] NO    DATE INSERTED:  REMOVE:  [ ] YES [ ] NO  EXPLAIN:      PAST MEDICAL & SURGICAL HISTORY:  Diabetes  HTN (hypertension)      REVIEW OF SYSTEMS:    CONSTITUTIONAL: No fever, weight loss, or fatigue  EYES: No eye pain, visual disturbances, or discharge  ENMT:  No difficulty hearing, tinnitus, vertigo; No sinus or throat pain  NECK: No pain or stiffness  BREASTS: No pain, masses, or nipple discharge  RESPIRATORY: No cough, wheezing, chills or hemoptysis; No shortness of breath  CARDIOVASCULAR: No chest pain, palpitations, dizziness, or leg swelling  GASTROINTESTINAL: No abdominal or epigastric pain. No nausea, vomiting, or hematemesis; No diarrhea or constipation. No melena or hematochezia.  GENITOURINARY: No dysuria, frequency, hematuria, or incontinence  NEUROLOGICAL: No headaches, memory loss, loss of strength, numbness, or tremors  SKIN: No itching, burning, rashes, or lesions   LYMPH NODES: No enlarged glands  ENDOCRINE: No heat or cold intolerance; No hair loss  MUSCULOSKELETAL: No joint pain or swelling; No muscle, back, or extremity pain  PSYCHIATRIC: No depression, anxiety, mood swings, or difficulty sleeping  HEME/LYMPH: No easy bruising, or bleeding gums  ALLERGY AND IMMUNOLOGIC: No hives or eczema      T(C): 36.8 (08-26-17 @ 11:31), Max: 36.9 (08-26-17 @ 05:21)  HR: 62 (08-26-17 @ 11:31) (62 - 68)  BP: 118/80 (08-26-17 @ 11:31) (112/73 - 126/86)  RR: 20 (08-26-17 @ 11:31) (17 - 20)  SpO2: 98% (08-26-17 @ 11:31) (97% - 98%)  Wt(kg): --        I&O's Detail    25 Aug 2017 07:01  -  26 Aug 2017 07:00  --------------------------------------------------------  IN:    dextrose 5% + sodium chloride 0.45%.: 600 mL    Oral Fluid: 580 mL    Solution: 200 mL  Total IN: 1380 mL    OUT:    Voided: 300 mL  Total OUT: 300 mL    Total NET: 1080 mL            PHYSICAL EXAM:    GENERAL: NAD, well-groomed, well-developed  HEAD:  Atraumatic, Normocephalic  EYES: EOMI, PERRLA, conjunctiva and sclera clear  ENMT: No tonsillar erythema, exudates, or enlargement; Moist mucous membranes, Good dentition, No lesions  NECK: Supple, No JVD, Normal thyroid  NERVOUS SYSTEM:  Alert & Oriented X3, Good concentration; Motor Strength 5/5 B/L upper and lower extremities; DTRs 2+ intact and symmetric  CHEST/LUNG: Clear to percussion bilaterally; No rales, rhonchi, wheezing, or rubs  HEART: Regular rate and rhythm; No murmurs, rubs, or gallops  ABDOMEN: Soft, Nontender, Nondistended; Bowel sounds present  EXTREMITIES:  2+ Peripheral Pulses, No clubbing, cyanosis, or edema  LYMPH: No lymphadenopathy noted  SKIN: No rashes or lesions      LABS:  CBC Full  -  ( 25 Aug 2017 08:46 )  WBC Count : 10.8 K/uL  Hemoglobin : 14.6 g/dL  Hematocrit : 40.4 %  Platelet Count - Automated : 374 K/uL  Mean Cell Volume : 89.4 fl  Mean Cell Hemoglobin : 32.3 pg  Mean Cell Hemoglobin Concentration : 36.1 gm/dL  Auto Neutrophil # : x  Auto Lymphocyte # : x  Auto Monocyte # : x  Auto Eosinophil # : x  Auto Basophil # : x  Auto Neutrophil % : x  Auto Lymphocyte % : x  Auto Monocyte % : x  Auto Eosinophil % : x  Auto Basophil % : x                  RADIOLOGY & ADDITIONAL STUDIES:

## 2017-08-26 NOTE — PROGRESS NOTE ADULT - SUBJECTIVE AND OBJECTIVE BOX
Patient is a 66y old  Male who presents with a chief complaint of fever, abdominal pain (12 Aug 2017 15:51)        PAST MEDICAL & SURGICAL HISTORY:  Diabetes  HTN (hypertension)      Summary of admission HPI: CVA NSTEMI Afib                PREVIOUS DIAGNOSTIC TESTING:      ECHO  FINDINGS:    STRESS  FINDINGS:    CATHETERIZATION  FINDINGS:    ELECTROPHYSIOLOGY STUDY  FINDINGS:    CAROTID ULTRASOUND:  FINDINGS    VENOUS DUPLEX SCAN:  FINDINGS:    CHEST CT PULMONARY ANGIO with IV Contrast:  FINDINGS:  MEDICATIONS  (STANDING):  aspirin enteric coated 81 milliGRAM(s) Oral daily  ATENolol  Tablet 50 milliGRAM(s) Oral daily  insulin lispro (HumaLOG) corrective regimen sliding scale   SubCutaneous three times a day before meals  dextrose 5%. 1000 milliLiter(s) (50 mL/Hr) IV Continuous <Continuous>  dextrose 50% Injectable 12.5 Gram(s) IV Push once  dextrose 50% Injectable 25 Gram(s) IV Push once  dextrose 50% Injectable 25 Gram(s) IV Push once  lidocaine   Patch 1 Patch Transdermal daily  buPROPion XL . 300 milliGRAM(s) Oral daily  meropenem IVPB 500 milliGRAM(s) IV Intermittent every 8 hours  rivaroxaban 20 milliGRAM(s) Oral <User Schedule>  losartan 100 milliGRAM(s) Oral daily  pantoprazole    Tablet 40 milliGRAM(s) Oral before breakfast  dextrose 5% + sodium chloride 0.45%. 1000 milliLiter(s) (50 mL/Hr) IV Continuous <Continuous>    MEDICATIONS  (PRN):  dextrose Gel 1 Dose(s) Oral once PRN Blood Glucose LESS THAN 70 milliGRAM(s)/deciliter  glucagon  Injectable 1 milliGRAM(s) IntraMuscular once PRN Glucose LESS THAN 70 milligrams/deciliter  acetaminophen   Tablet 650 milliGRAM(s) Oral every 6 hours PRN For Temp greater than 38 C (100.4 F)  diphenhydrAMINE   Capsule 25 milliGRAM(s) Oral once PRN Insomnia  ondansetron Injectable 4 milliGRAM(s) IV Push every 6 hours PRN Nausea and/or Vomiting  benzocaine 15 mG/menthol 3.6 mG Lozenge 1 Lozenge Oral every 6 hours PRN Sore Throat      FAMILY HISTORY:      SOCIAL HISTORY:    CIGARETTES:    ALCOHOL:    REVIEW OF SYSTEMS:    CONSTITUTIONAL: No fever, weight loss, chills, shakes, or fatigue  EYES: No eye pain, visual disturbances, or discharge  ENMT:  No difficulty hearing, tinnitus, vertigo; No sinus or throat pain  NECK: No pain or stiffness  BREASTS: No pain, masses, or nipple discharge  RESPIRATORY: No cough, wheezing, hemoptysis, or shortness of breath  CARDIOVASCULAR: No chest pain, dyspnea, palpitations, dizziness, syncope, paroxysmal nocturnal dyspnea, orthopnea, or arm or leg swelling  GASTROINTESTINAL: No abdominal  or epigastric pain, nausea, vomiting, hematemesis, diarrhea, constipation, melena or bright red blood.  GENITOURINARY: No dysuria, nocturia, hematuria, or urinary incontinence  NEUROLOGICAL: No headaches, memory loss, slurred speech, limb weakness, loss of strength, numbness, or tremors  SKIN: No itching, burning, rashes, or lesions   LYMPH NODES: No enlarged glands  ENDOCRINE: No heat or cold intolerance, or hair loss  MUSCULOSKELETAL: No joint pain or swelling, muscle, back, or extremity pain  PSYCHIATRIC: No depression, anxiety, or difficulty sleeping  HEME/LYMPH: No easy bruising or bleeding gums  ALLERY AND IMMUNOLOGIC: No hives or rash.      Vital Signs Last 24 Hrs  T(C): 36.8 (26 Aug 2017 11:31), Max: 37.1 (25 Aug 2017 16:42)  T(F): 98.2 (26 Aug 2017 11:31), Max: 98.8 (25 Aug 2017 16:42)  HR: 62 (26 Aug 2017 11:31) (62 - 68)  BP: 118/80 (26 Aug 2017 11:31) (112/73 - 126/86)  BP(mean): --  RR: 20 (26 Aug 2017 11:31) (17 - 20)  SpO2: 98% (26 Aug 2017 11:31) (97% - 99%)    PHYSICAL EXAM:    GENERAL: In no apparent distress, well nourished, and hydrated.  HEAD:  Atraumatic, Normocephalic  EYES: EOMI, PERRLA, conjunctiva and sclera clear  ENMT: No tonsillar erythema, exudates, or enlargement; Moist mucous membranes, Good dentition, No lesions  NECK: Supple and normal thyroid.  No JVD or carotid bruit.  Carotid pulse is 2+ bilaterally.  HEART: Regular rate and rhythm; No murmurs, rubs, or gallops.  PULMONARY: Clear to auscultation and perfusion.  No rales, wheezing, or rhonchi bilaterally.  ABDOMEN: Soft, Nontender, Nondistended; Bowel sounds present  EXTREMITIES:  2+ Peripheral Pulses, No clubbing, cyanosis, or edema  LYMPH: No lymphadenopathy noted  NEUROLOGICAL: Grossly nonfocal          INTERPRETATION OF TELEMETRY:    ECG:    I&O's Detail    25 Aug 2017 07:01  -  26 Aug 2017 07:00  --------------------------------------------------------  IN:    dextrose 5% + sodium chloride 0.45%.: 600 mL    Oral Fluid: 580 mL    Solution: 200 mL  Total IN: 1380 mL    OUT:    Voided: 300 mL  Total OUT: 300 mL    Total NET: 1080 mL          LABS:                        14.6   10.8  )-----------( 374      ( 25 Aug 2017 08:46 )             40.4                   BNP  I&O's Detail    25 Aug 2017 07:01  -  26 Aug 2017 07:00  --------------------------------------------------------  IN:    dextrose 5% + sodium chloride 0.45%.: 600 mL    Oral Fluid: 580 mL    Solution: 200 mL  Total IN: 1380 mL    OUT:    Voided: 300 mL  Total OUT: 300 mL    Total NET: 1080 mL        Daily     Daily     RADIOLOGY & ADDITIONAL STUDIES:

## 2017-08-26 NOTE — PROGRESS NOTE ADULT - SUBJECTIVE AND OBJECTIVE BOX
65 y/o M w hx of DM, htn, presents w fever and abd pain x 1 week; reports the pain is in bilateral lower quadrant; denies vomiting; denies diarrhea; denies urinary symptoms; + cough; denies CP/SOB; denies lower ext swelling; no headache or neck stiffness (12 Aug 2017 15:51)  here last high tempt on 8/13; then 100.8 on 8/14  now afebrile x > 1 week   main issues is left weakness  Allergies    No Known Allergies    Intolerances        MEDICATIONS  (STANDING):  aspirin enteric coated 81 milliGRAM(s) Oral daily  ATENolol  Tablet 50 milliGRAM(s) Oral daily  insulin lispro (HumaLOG) corrective regimen sliding scale   SubCutaneous three times a day before meals  dextrose 5%. 1000 milliLiter(s) (50 mL/Hr) IV Continuous <Continuous>  dextrose 50% Injectable 12.5 Gram(s) IV Push once  dextrose 50% Injectable 25 Gram(s) IV Push once  dextrose 50% Injectable 25 Gram(s) IV Push once  lidocaine   Patch 1 Patch Transdermal daily  buPROPion XL . 300 milliGRAM(s) Oral daily  rivaroxaban 20 milliGRAM(s) Oral <User Schedule>  losartan 100 milliGRAM(s) Oral daily  pantoprazole    Tablet 40 milliGRAM(s) Oral before breakfast  dextrose 5% + sodium chloride 0.45%. 1000 milliLiter(s) (50 mL/Hr) IV Continuous <Continuous>    MEDICATIONS  (PRN):  dextrose Gel 1 Dose(s) Oral once PRN Blood Glucose LESS THAN 70 milliGRAM(s)/deciliter  glucagon  Injectable 1 milliGRAM(s) IntraMuscular once PRN Glucose LESS THAN 70 milligrams/deciliter  acetaminophen   Tablet 650 milliGRAM(s) Oral every 6 hours PRN For Temp greater than 38 C (100.4 F)  diphenhydrAMINE   Capsule 25 milliGRAM(s) Oral once PRN Insomnia  ondansetron Injectable 4 milliGRAM(s) IV Push every 6 hours PRN Nausea and/or Vomiting  benzocaine 15 mG/menthol 3.6 mG Lozenge 1 Lozenge Oral every 6 hours PRN Sore Throat      REVIEW OF SYSTEMS:    CONSTITUTIONAL: No fever, chills, weight loss, or fatigue  HEENT: No sore throat, runny nose, ear ache  RESPIRATORY: No cough, wheezing, No shortness of breath  CARDIOVASCULAR: No chest pain, palpitations, dizziness  GASTROINTESTINAL: No abdominal pain. No nausea, vomiting, diarrhea  GENITOURINARY: No dysuria, increase frequency, hematuria, or incontinence  NEUROLOGICAL: No headaches, memory loss, loss of strength, numbness, or tremors, no weakness  EXTREMITY: No pedal edema BLE  SKIN: No itching, burning, rashes, or lesions     VITAL SIGNS:  T(C): 36.8 (08-26-17 @ 11:31), Max: 36.9 (08-26-17 @ 05:21)  T(F): 98.2 (08-26-17 @ 11:31), Max: 98.4 (08-26-17 @ 05:21)  HR: 62 (08-26-17 @ 11:31) (62 - 68)  BP: 118/80 (08-26-17 @ 11:31) (112/73 - 126/86)  RR: 20 (08-26-17 @ 11:31) (17 - 20)  SpO2: 98% (08-26-17 @ 11:31) (97% - 98%)  Wt(kg): --    PHYSICAL EXAM:    GENERAL: not in any distress  HEENT: Neck is supple, normocephalic, atraumatic   CHEST/LUNG: Clear to percussion bilaterally; No rales, rhonchi, wheezing  HEART: Regular rate and rhythm; No murmurs, rubs, or gallops  ABDOMEN: Soft, Nontender, Nondistended; Bowel sounds present, no rebound   EXTREMITIES:  2+ Peripheral Pulses, No clubbing, cyanosis, or edema  SKIN: No rashes or lesions  BACK: no pressor sore   NERVOUS SYSTEM:  Alert & Oriented X3, Good concentration  left weakness is better   LABS:                         14.6   10.8  )-----------( 374      ( 25 Aug 2017 08:46 )             40.4                                                   Radiology: 65 y/o M w hx of DM, htn, presents w fever and abd pain x 1 week; reports the pain is in bilateral lower quadrant; denies vomiting; denies diarrhea; denies urinary symptoms; + cough; denies CP/SOB; denies lower ext swelling; no headache or neck stiffness (12 Aug 2017 15:51)  here last high tempt on 8/13; then 100.8 on 8/14  now afebrile x > 1 week   main issues is left weakness  Allergies    No Known Allergies    Intolerances        MEDICATIONS  (STANDING):  aspirin enteric coated 81 milliGRAM(s) Oral daily  ATENolol  Tablet 50 milliGRAM(s) Oral daily  insulin lispro (HumaLOG) corrective regimen sliding scale   SubCutaneous three times a day before meals  dextrose 5%. 1000 milliLiter(s) (50 mL/Hr) IV Continuous <Continuous>  dextrose 50% Injectable 12.5 Gram(s) IV Push once  dextrose 50% Injectable 25 Gram(s) IV Push once  dextrose 50% Injectable 25 Gram(s) IV Push once  lidocaine   Patch 1 Patch Transdermal daily  buPROPion XL . 300 milliGRAM(s) Oral daily  rivaroxaban 20 milliGRAM(s) Oral <User Schedule>  losartan 100 milliGRAM(s) Oral daily  pantoprazole    Tablet 40 milliGRAM(s) Oral before breakfast  dextrose 5% + sodium chloride 0.45%. 1000 milliLiter(s) (50 mL/Hr) IV Continuous <Continuous>    MEDICATIONS  (PRN):  dextrose Gel 1 Dose(s) Oral once PRN Blood Glucose LESS THAN 70 milliGRAM(s)/deciliter  glucagon  Injectable 1 milliGRAM(s) IntraMuscular once PRN Glucose LESS THAN 70 milligrams/deciliter  acetaminophen   Tablet 650 milliGRAM(s) Oral every 6 hours PRN For Temp greater than 38 C (100.4 F)  diphenhydrAMINE   Capsule 25 milliGRAM(s) Oral once PRN Insomnia  ondansetron Injectable 4 milliGRAM(s) IV Push every 6 hours PRN Nausea and/or Vomiting  benzocaine 15 mG/menthol 3.6 mG Lozenge 1 Lozenge Oral every 6 hours PRN Sore Throat      REVIEW OF SYSTEMS:    CONSTITUTIONAL: No fever, chills, weight loss, or fatigue  HEENT: No sore throat, runny nose, ear ache  RESPIRATORY: No cough, wheezing, No shortness of breath  CARDIOVASCULAR: No chest pain, palpitations, dizziness  GASTROINTESTINAL: No abdominal pain. No nausea, vomiting, diarrhea  GENITOURINARY: No dysuria, increase frequency, hematuria, or incontinence  NEUROLOGICAL: No headaches, memory loss, loss of strength, numbness, or tremors, no change in status of  weakness in arm   but leg is much better   EXTREMITY: No pedal edema BLE  SKIN: No itching, burning, rashes, or lesions     VITAL SIGNS:  T(C): 36.8 (08-26-17 @ 11:31), Max: 36.9 (08-26-17 @ 05:21)  T(F): 98.2 (08-26-17 @ 11:31), Max: 98.4 (08-26-17 @ 05:21)  HR: 62 (08-26-17 @ 11:31) (62 - 68)  BP: 118/80 (08-26-17 @ 11:31) (112/73 - 126/86)  RR: 20 (08-26-17 @ 11:31) (17 - 20)  SpO2: 98% (08-26-17 @ 11:31) (97% - 98%)  Wt(kg): --    PHYSICAL EXAM:    GENERAL: not in any distress  HEENT: Neck is supple, normocephalic, atraumatic   CHEST/LUNG: Clear to percussion bilaterally; No rales, rhonchi, wheezing  HEART: Regular rate and rhythm; No murmurs, rubs, or gallops  ABDOMEN: Soft, Nontender, Nondistended; Bowel sounds present, no rebound   EXTREMITIES:  2+ Peripheral Pulses, No clubbing, cyanosis, or edema  SKIN: No rashes or lesions  BACK: no pressor sore   NERVOUS SYSTEM:  Alert & Oriented X3, Good concentration  left weakness is better in leg more than arm   LABS:                         14.6   10.8  )-----------( 374      ( 25 Aug 2017 08:46 )             40.4                                                   Radiology:

## 2017-08-26 NOTE — PROGRESS NOTE ADULT - ASSESSMENT
fever resolved   acute neuro abnormalities ?? from   now sbo   left sided weakness ;    neuro follow up noted  repeat ct NEGATIVE   left weakness unexplained; neuro follow up ; ct today noted  all culture NEGATIVE so far  dc all antibiotics and watch

## 2017-08-27 RX ADMIN — PANTOPRAZOLE SODIUM 40 MILLIGRAM(S): 20 TABLET, DELAYED RELEASE ORAL at 06:08

## 2017-08-27 RX ADMIN — BUPROPION HYDROCHLORIDE 300 MILLIGRAM(S): 150 TABLET, EXTENDED RELEASE ORAL at 12:26

## 2017-08-27 RX ADMIN — Medication 81 MILLIGRAM(S): at 12:26

## 2017-08-27 RX ADMIN — SODIUM CHLORIDE 50 MILLILITER(S): 9 INJECTION, SOLUTION INTRAVENOUS at 12:26

## 2017-08-27 RX ADMIN — RIVAROXABAN 20 MILLIGRAM(S): KIT at 12:26

## 2017-08-27 RX ADMIN — LIDOCAINE 1 PATCH: 4 CREAM TOPICAL at 12:26

## 2017-08-27 RX ADMIN — LOSARTAN POTASSIUM 100 MILLIGRAM(S): 100 TABLET, FILM COATED ORAL at 06:08

## 2017-08-27 RX ADMIN — ATENOLOL 50 MILLIGRAM(S): 25 TABLET ORAL at 06:08

## 2017-08-27 NOTE — PROGRESS NOTE ADULT - SUBJECTIVE AND OBJECTIVE BOX
Patient is a 66y old  Male who presents with a chief complaint of fever, abdominal pain (12 Aug 2017 15:51)        PAST MEDICAL & SURGICAL HISTORY:  Diabetes  HTN (hypertension)      Summary of admission HPI: Afib CVa NSTEMI                PREVIOUS DIAGNOSTIC TESTING:      ECHO  FINDINGS:    STRESS  FINDINGS:    CATHETERIZATION  FINDINGS:    ELECTROPHYSIOLOGY STUDY  FINDINGS:    CAROTID ULTRASOUND:  FINDINGS    VENOUS DUPLEX SCAN:  FINDINGS:    CHEST CT PULMONARY ANGIO with IV Contrast:  FINDINGS:  MEDICATIONS  (STANDING):  aspirin enteric coated 81 milliGRAM(s) Oral daily  ATENolol  Tablet 50 milliGRAM(s) Oral daily  insulin lispro (HumaLOG) corrective regimen sliding scale   SubCutaneous three times a day before meals  dextrose 5%. 1000 milliLiter(s) (50 mL/Hr) IV Continuous <Continuous>  dextrose 50% Injectable 12.5 Gram(s) IV Push once  dextrose 50% Injectable 25 Gram(s) IV Push once  dextrose 50% Injectable 25 Gram(s) IV Push once  lidocaine   Patch 1 Patch Transdermal daily  buPROPion XL . 300 milliGRAM(s) Oral daily  rivaroxaban 20 milliGRAM(s) Oral <User Schedule>  losartan 100 milliGRAM(s) Oral daily  pantoprazole    Tablet 40 milliGRAM(s) Oral before breakfast  dextrose 5% + sodium chloride 0.45%. 1000 milliLiter(s) (50 mL/Hr) IV Continuous <Continuous>    MEDICATIONS  (PRN):  dextrose Gel 1 Dose(s) Oral once PRN Blood Glucose LESS THAN 70 milliGRAM(s)/deciliter  glucagon  Injectable 1 milliGRAM(s) IntraMuscular once PRN Glucose LESS THAN 70 milligrams/deciliter  acetaminophen   Tablet 650 milliGRAM(s) Oral every 6 hours PRN For Temp greater than 38 C (100.4 F)  diphenhydrAMINE   Capsule 25 milliGRAM(s) Oral once PRN Insomnia  ondansetron Injectable 4 milliGRAM(s) IV Push every 6 hours PRN Nausea and/or Vomiting  benzocaine 15 mG/menthol 3.6 mG Lozenge 1 Lozenge Oral every 6 hours PRN Sore Throat      FAMILY HISTORY:      SOCIAL HISTORY:    CIGARETTES:    ALCOHOL:    REVIEW OF SYSTEMS:    CONSTITUTIONAL: No fever, weight loss, chills, shakes, or fatigue  EYES: No eye pain, visual disturbances, or discharge  ENMT:  No difficulty hearing, tinnitus, vertigo; No sinus or throat pain  NECK: No pain or stiffness  BREASTS: No pain, masses, or nipple discharge  RESPIRATORY: No cough, wheezing, hemoptysis, or shortness of breath  CARDIOVASCULAR: No chest pain, dyspnea, palpitations, dizziness, syncope, paroxysmal nocturnal dyspnea, orthopnea, or arm or leg swelling  GASTROINTESTINAL: No abdominal  or epigastric pain, nausea, vomiting, hematemesis, diarrhea, constipation, melena or bright red blood.  GENITOURINARY: No dysuria, nocturia, hematuria, or urinary incontinence  NEUROLOGICAL: No headaches, memory loss, slurred speech, limb weakness, loss of strength, numbness, or tremors  SKIN: No itching, burning, rashes, or lesions   LYMPH NODES: No enlarged glands  ENDOCRINE: No heat or cold intolerance, or hair loss  MUSCULOSKELETAL: No joint pain or swelling, muscle, back, or extremity pain  PSYCHIATRIC: No depression, anxiety, or difficulty sleeping  HEME/LYMPH: No easy bruising or bleeding gums  ALLERY AND IMMUNOLOGIC: No hives or rash.      Vital Signs Last 24 Hrs  T(C): 36.4 (27 Aug 2017 05:21), Max: 37.4 (26 Aug 2017 17:37)  T(F): 97.6 (27 Aug 2017 05:21), Max: 99.4 (26 Aug 2017 17:37)  HR: 66 (27 Aug 2017 05:21) (62 - 68)  BP: 134/84 (27 Aug 2017 05:21) (118/80 - 134/84)  BP(mean): --  RR: 18 (27 Aug 2017 05:21) (18 - 20)  SpO2: 97% (27 Aug 2017 05:21) (97% - 98%)    PHYSICAL EXAM:    GENERAL: In no apparent distress, well nourished, and hydrated.  HEAD:  Atraumatic, Normocephalic  EYES: EOMI, PERRLA, conjunctiva and sclera clear  ENMT: No tonsillar erythema, exudates, or enlargement; Moist mucous membranes, Good dentition, No lesions  NECK: Supple and normal thyroid.  No JVD or carotid bruit.  Carotid pulse is 2+ bilaterally.  HEART: Regular rate and rhythm; No murmurs, rubs, or gallops.  PULMONARY: Clear to auscultation and perfusion.  No rales, wheezing, or rhonchi bilaterally.  ABDOMEN: Soft, Nontender, Nondistended; Bowel sounds present  EXTREMITIES:  2+ Peripheral Pulses, No clubbing, cyanosis, or edema  LYMPH: No lymphadenopathy noted  NEUROLOGICAL: Grossly nonfocal          INTERPRETATION OF TELEMETRY:    ECG:    I&O's Detail    26 Aug 2017 07:01  -  27 Aug 2017 07:00  --------------------------------------------------------  IN:    dextrose 5% + sodium chloride 0.45%.: 600 mL  Total IN: 600 mL    OUT:    Voided: 500 mL  Total OUT: 500 mL    Total NET: 100 mL          LABS:                  BNP  I&O's Detail    26 Aug 2017 07:01  -  27 Aug 2017 07:00  --------------------------------------------------------  IN:    dextrose 5% + sodium chloride 0.45%.: 600 mL  Total IN: 600 mL    OUT:    Voided: 500 mL  Total OUT: 500 mL    Total NET: 100 mL        Daily     Daily     RADIOLOGY & ADDITIONAL STUDIES:

## 2017-08-27 NOTE — PROGRESS NOTE ADULT - SUBJECTIVE AND OBJECTIVE BOX
INTERVAL HPI/OVERNIGHT EVENTS:      No new complaints.  Awaiting placement.      Antimicrobial:    Cardiovascular:  ATENolol  Tablet 50 milliGRAM(s) Oral daily  losartan 100 milliGRAM(s) Oral daily    Pulmonary:    Hematalogic:  aspirin enteric coated 81 milliGRAM(s) Oral daily  rivaroxaban 20 milliGRAM(s) Oral <User Schedule>    Other:  insulin lispro (HumaLOG) corrective regimen sliding scale   SubCutaneous three times a day before meals  dextrose 5%. 1000 milliLiter(s) IV Continuous <Continuous>  dextrose Gel 1 Dose(s) Oral once PRN  dextrose 50% Injectable 12.5 Gram(s) IV Push once  dextrose 50% Injectable 25 Gram(s) IV Push once  dextrose 50% Injectable 25 Gram(s) IV Push once  glucagon  Injectable 1 milliGRAM(s) IntraMuscular once PRN  lidocaine   Patch 1 Patch Transdermal daily  acetaminophen   Tablet 650 milliGRAM(s) Oral every 6 hours PRN  buPROPion XL . 300 milliGRAM(s) Oral daily  diphenhydrAMINE   Capsule 25 milliGRAM(s) Oral once PRN  ondansetron Injectable 4 milliGRAM(s) IV Push every 6 hours PRN  benzocaine 15 mG/menthol 3.6 mG Lozenge 1 Lozenge Oral every 6 hours PRN  pantoprazole    Tablet 40 milliGRAM(s) Oral before breakfast  dextrose 5% + sodium chloride 0.45%. 1000 milliLiter(s) IV Continuous <Continuous>    aspirin enteric coated 81 milliGRAM(s) Oral daily  ATENolol  Tablet 50 milliGRAM(s) Oral daily  insulin lispro (HumaLOG) corrective regimen sliding scale   SubCutaneous three times a day before meals  dextrose 5%. 1000 milliLiter(s) IV Continuous <Continuous>  dextrose Gel 1 Dose(s) Oral once PRN  dextrose 50% Injectable 12.5 Gram(s) IV Push once  dextrose 50% Injectable 25 Gram(s) IV Push once  dextrose 50% Injectable 25 Gram(s) IV Push once  glucagon  Injectable 1 milliGRAM(s) IntraMuscular once PRN  lidocaine   Patch 1 Patch Transdermal daily  acetaminophen   Tablet 650 milliGRAM(s) Oral every 6 hours PRN  buPROPion XL . 300 milliGRAM(s) Oral daily  rivaroxaban 20 milliGRAM(s) Oral <User Schedule>  diphenhydrAMINE   Capsule 25 milliGRAM(s) Oral once PRN  ondansetron Injectable 4 milliGRAM(s) IV Push every 6 hours PRN  benzocaine 15 mG/menthol 3.6 mG Lozenge 1 Lozenge Oral every 6 hours PRN  losartan 100 milliGRAM(s) Oral daily  pantoprazole    Tablet 40 milliGRAM(s) Oral before breakfast  dextrose 5% + sodium chloride 0.45%. 1000 milliLiter(s) IV Continuous <Continuous>    Drug Dosing Weight  Height (cm): 182.88 (12 Aug 2017 10:02)  Weight (kg): 83.9 (12 Aug 2017 18:00)  BMI (kg/m2): 25.1 (12 Aug 2017 18:00)  BSA (m2): 2.06 (12 Aug 2017 18:00)        SCALES: [ ] YES [ ] NO    DATE INSERTED:  REMOVE:  [ ] YES [ ] NO  EXPLAIN:      PAST MEDICAL & SURGICAL HISTORY:  Diabetes  HTN (hypertension)      REVIEW OF SYSTEMS:    CONSTITUTIONAL: No fever, weight loss, or fatigue  EYES: No eye pain, visual disturbances, or discharge  ENMT:  No difficulty hearing, tinnitus, vertigo; No sinus or throat pain  NECK: No pain or stiffness  BREASTS: No pain, masses, or nipple discharge  RESPIRATORY: No cough, wheezing, chills or hemoptysis; No shortness of breath  CARDIOVASCULAR: No chest pain, palpitations, dizziness, or leg swelling  GASTROINTESTINAL: No abdominal or epigastric pain. No nausea, vomiting, or hematemesis; No diarrhea or constipation. No melena or hematochezia.  GENITOURINARY: No dysuria, frequency, hematuria, or incontinence  NEUROLOGICAL: No headaches, memory loss, loss of strength, numbness, or tremors  SKIN: No itching, burning, rashes, or lesions   LYMPH NODES: No enlarged glands  ENDOCRINE: No heat or cold intolerance; No hair loss  MUSCULOSKELETAL: No joint pain or swelling; No muscle, back, or extremity pain  PSYCHIATRIC: No depression, anxiety, mood swings, or difficulty sleeping  HEME/LYMPH: No easy bruising, or bleeding gums  ALLERGY AND IMMUNOLOGIC: No hives or eczema      T(C): 37.7 (08-27-17 @ 17:21), Max: 37.7 (08-27-17 @ 17:21)  HR: 63 (08-27-17 @ 17:21) (60 - 68)  BP: 128/77 (08-27-17 @ 17:21) (123/76 - 134/84)  RR: 18 (08-27-17 @ 17:21) (18 - 20)  SpO2: 97% (08-27-17 @ 17:21) (97% - 97%)  Wt(kg): --        I&O's Detail    26 Aug 2017 07:01  -  27 Aug 2017 07:00  --------------------------------------------------------  IN:    dextrose 5% + sodium chloride 0.45%.: 600 mL  Total IN: 600 mL    OUT:    Voided: 500 mL  Total OUT: 500 mL    Total NET: 100 mL      27 Aug 2017 07:01  -  27 Aug 2017 19:59  --------------------------------------------------------  IN:    dextrose 5% + sodium chloride 0.45%.: 600 mL    Oral Fluid: 300 mL  Total IN: 900 mL    OUT:  Total OUT: 0 mL    Total NET: 900 mL            PHYSICAL EXAM:    GENERAL: NAD, well-groomed, well-developed  HEAD:  Atraumatic, Normocephalic  EYES: EOMI, PERRLA, conjunctiva and sclera clear  ENMT: No tonsillar erythema, exudates, or enlargement; Moist mucous membranes, Good dentition, No lesions  NECK: Supple, No JVD, Normal thyroid  NERVOUS SYSTEM:  Alert & Oriented X3, Good concentration; Motor Strength 5/5 B/L upper and lower extremities; DTRs 2+ intact and symmetric  CHEST/LUNG: Clear to percussion bilaterally; No rales, rhonchi, wheezing, or rubs  HEART: Regular rate and rhythm; No murmurs, rubs, or gallops  ABDOMEN: Soft, Nontender, Nondistended; Bowel sounds present  EXTREMITIES:  2+ Peripheral Pulses, No clubbing, cyanosis, or edema  LYMPH: No lymphadenopathy noted  SKIN: No rashes or lesions      LABS:                  RADIOLOGY & ADDITIONAL STUDIES:

## 2017-08-28 VITALS
RESPIRATION RATE: 18 BRPM | SYSTOLIC BLOOD PRESSURE: 128 MMHG | HEART RATE: 77 BPM | TEMPERATURE: 98 F | DIASTOLIC BLOOD PRESSURE: 77 MMHG | OXYGEN SATURATION: 95 %

## 2017-08-28 RX ADMIN — ATENOLOL 50 MILLIGRAM(S): 25 TABLET ORAL at 05:28

## 2017-08-28 RX ADMIN — BUPROPION HYDROCHLORIDE 300 MILLIGRAM(S): 150 TABLET, EXTENDED RELEASE ORAL at 11:19

## 2017-08-28 RX ADMIN — LIDOCAINE 1 PATCH: 4 CREAM TOPICAL at 11:19

## 2017-08-28 RX ADMIN — Medication 81 MILLIGRAM(S): at 11:19

## 2017-08-28 RX ADMIN — LOSARTAN POTASSIUM 100 MILLIGRAM(S): 100 TABLET, FILM COATED ORAL at 05:29

## 2017-08-28 RX ADMIN — RIVAROXABAN 20 MILLIGRAM(S): KIT at 11:19

## 2017-08-28 RX ADMIN — LIDOCAINE 1 PATCH: 4 CREAM TOPICAL at 00:03

## 2017-08-28 RX ADMIN — PANTOPRAZOLE SODIUM 40 MILLIGRAM(S): 20 TABLET, DELAYED RELEASE ORAL at 05:29

## 2017-08-28 NOTE — PROGRESS NOTE ADULT - PROBLEM SELECTOR PROBLEM 3
Type 2 diabetes mellitus without complication, without long-term current use of insulin
Atrial fibrillation
Cerebrovascular accident (CVA), unspecified mechanism
Type 2 diabetes mellitus without complication, without long-term current use of insulin
Type 2 diabetes mellitus without complication, without long-term current use of insulin

## 2017-08-28 NOTE — PROGRESS NOTE ADULT - PROBLEM SELECTOR PROBLEM 1
Delirium
Sepsis, due to unspecified organism
NSTEMI (non-ST elevated myocardial infarction)
Delirium

## 2017-08-28 NOTE — PROGRESS NOTE ADULT - PROBLEM SELECTOR PROBLEM 4
Essential hypertension
well-nourished/well-developed/no distress/well-groomed

## 2017-08-28 NOTE — PROGRESS NOTE ADULT - PROBLEM SELECTOR PLAN 4
optimise blood pressure control.
optimize blood pressure control.

## 2017-08-28 NOTE — PROGRESS NOTE ADULT - SUBJECTIVE AND OBJECTIVE BOX
INTERVAL HPI/OVERNIGHT EVENTS:      Awaiting discharge.      Antimicrobial:    Cardiovascular:  ATENolol  Tablet 50 milliGRAM(s) Oral daily  losartan 100 milliGRAM(s) Oral daily    Pulmonary:    Hematalogic:  aspirin enteric coated 81 milliGRAM(s) Oral daily  rivaroxaban 20 milliGRAM(s) Oral <User Schedule>    Other:  insulin lispro (HumaLOG) corrective regimen sliding scale   SubCutaneous three times a day before meals  dextrose 5%. 1000 milliLiter(s) IV Continuous <Continuous>  dextrose Gel 1 Dose(s) Oral once PRN  dextrose 50% Injectable 12.5 Gram(s) IV Push once  dextrose 50% Injectable 25 Gram(s) IV Push once  dextrose 50% Injectable 25 Gram(s) IV Push once  glucagon  Injectable 1 milliGRAM(s) IntraMuscular once PRN  lidocaine   Patch 1 Patch Transdermal daily  acetaminophen   Tablet 650 milliGRAM(s) Oral every 6 hours PRN  buPROPion XL . 300 milliGRAM(s) Oral daily  diphenhydrAMINE   Capsule 25 milliGRAM(s) Oral once PRN  ondansetron Injectable 4 milliGRAM(s) IV Push every 6 hours PRN  benzocaine 15 mG/menthol 3.6 mG Lozenge 1 Lozenge Oral every 6 hours PRN  pantoprazole    Tablet 40 milliGRAM(s) Oral before breakfast    aspirin enteric coated 81 milliGRAM(s) Oral daily  ATENolol  Tablet 50 milliGRAM(s) Oral daily  insulin lispro (HumaLOG) corrective regimen sliding scale   SubCutaneous three times a day before meals  dextrose 5%. 1000 milliLiter(s) IV Continuous <Continuous>  dextrose Gel 1 Dose(s) Oral once PRN  dextrose 50% Injectable 12.5 Gram(s) IV Push once  dextrose 50% Injectable 25 Gram(s) IV Push once  dextrose 50% Injectable 25 Gram(s) IV Push once  glucagon  Injectable 1 milliGRAM(s) IntraMuscular once PRN  lidocaine   Patch 1 Patch Transdermal daily  acetaminophen   Tablet 650 milliGRAM(s) Oral every 6 hours PRN  buPROPion XL . 300 milliGRAM(s) Oral daily  rivaroxaban 20 milliGRAM(s) Oral <User Schedule>  diphenhydrAMINE   Capsule 25 milliGRAM(s) Oral once PRN  ondansetron Injectable 4 milliGRAM(s) IV Push every 6 hours PRN  benzocaine 15 mG/menthol 3.6 mG Lozenge 1 Lozenge Oral every 6 hours PRN  losartan 100 milliGRAM(s) Oral daily  pantoprazole    Tablet 40 milliGRAM(s) Oral before breakfast    Drug Dosing Weight  Height (cm): 182.88 (12 Aug 2017 10:02)  Weight (kg): 83.9 (12 Aug 2017 18:00)  BMI (kg/m2): 25.1 (12 Aug 2017 18:00)  BSA (m2): 2.06 (12 Aug 2017 18:00)        SCALES: [ ] YES [ ] NO    DATE INSERTED:  REMOVE:  [ ] YES [ ] NO  EXPLAIN:      PAST MEDICAL & SURGICAL HISTORY:  Diabetes  HTN (hypertension)      REVIEW OF SYSTEMS:    CONSTITUTIONAL: No fever, weight loss, or fatigue  EYES: No eye pain, visual disturbances, or discharge  ENMT:  No difficulty hearing, tinnitus, vertigo; No sinus or throat pain  NECK: No pain or stiffness  BREASTS: No pain, masses, or nipple discharge  RESPIRATORY: No cough, wheezing, chills or hemoptysis; No shortness of breath  CARDIOVASCULAR: No chest pain, palpitations, dizziness, or leg swelling  GASTROINTESTINAL: No abdominal or epigastric pain. No nausea, vomiting, or hematemesis; No diarrhea or constipation. No melena or hematochezia.  GENITOURINARY: No dysuria, frequency, hematuria, or incontinence  NEUROLOGICAL: No headaches, memory loss, loss of strength, numbness, or tremors  SKIN: No itching, burning, rashes, or lesions   LYMPH NODES: No enlarged glands  ENDOCRINE: No heat or cold intolerance; No hair loss  MUSCULOSKELETAL: No joint pain or swelling; No muscle, back, or extremity pain  PSYCHIATRIC: No depression, anxiety, mood swings, or difficulty sleeping  HEME/LYMPH: No easy bruising, or bleeding gums  ALLERGY AND IMMUNOLOGIC: No hives or eczema      T(C): 36.9 (08-28-17 @ 10:58), Max: 37.7 (08-27-17 @ 17:21)  HR: 67 (08-28-17 @ 12:10) (63 - 67)  BP: 117/67 (08-28-17 @ 12:10) (117/67 - 131/75)  RR: 18 (08-28-17 @ 10:58) (18 - 18)  SpO2: 97% (08-28-17 @ 12:10) (95% - 98%)  Wt(kg): --        I&O's Detail    27 Aug 2017 07:01  -  28 Aug 2017 07:00  --------------------------------------------------------  IN:    dextrose 5% + sodium chloride 0.45%.: 600 mL    Oral Fluid: 300 mL  Total IN: 900 mL    OUT:  Total OUT: 0 mL    Total NET: 900 mL      28 Aug 2017 07:01  -  28 Aug 2017 16:02  --------------------------------------------------------  IN:    Oral Fluid: 100 mL  Total IN: 100 mL    OUT:  Total OUT: 0 mL    Total NET: 100 mL            PHYSICAL EXAM:    GENERAL: NAD, well-groomed, well-developed  HEAD:  Atraumatic, Normocephalic  EYES: EOMI, PERRLA, conjunctiva and sclera clear  ENMT: No tonsillar erythema, exudates, or enlargement; Moist mucous membranes, Good dentition, No lesions  NECK: Supple, No JVD, Normal thyroid  NERVOUS SYSTEM:  Alert & Oriented X3, Good concentration; Motor Strength 5/5 B/L upper and lower extremities; DTRs 2+ intact and symmetric  CHEST/LUNG: Clear to percussion bilaterally; No rales, rhonchi, wheezing, or rubs  HEART: Regular rate and rhythm; No murmurs, rubs, or gallops  ABDOMEN: Soft, Nontender, Nondistended; Bowel sounds present  EXTREMITIES:  2+ Peripheral Pulses, No clubbing, cyanosis, or edema  LYMPH: No lymphadenopathy noted  SKIN: No rashes or lesions      LABS:                  RADIOLOGY & ADDITIONAL STUDIES:

## 2017-08-28 NOTE — PROGRESS NOTE ADULT - PROBLEM SELECTOR PLAN 2
resolving   neuro deficit to be addressed by neurologist   low esr noted
resolving   neuro deficit to be addressed by neurologist   low esr noted
Rate controlled, now sinus with extra systoles, get  TTE, EKG  change heparin to xarelto
Rate controlled, now sinus with extra systoles, get  TTE, EKG  cont hepartin meanwhile
anticoagulation for A Fib.
as neurology
as per neuro
as per neuro.
control BP
control HR
heparin, serial cardiac enzymes, cardiology evaluation. thyroid function tests, TTE.
rate control
resolving   neuro deficit to be addressed by neurologist   low esr noted
stable for discharge
heparin, serial cardiac enzymes, cardiology evaluation. thyroid function tests, TTE.

## 2017-08-28 NOTE — PROGRESS NOTE ADULT - PROBLEM SELECTOR PLAN 3
tight glycemic control
as per neuro discharge planning as per PMD
as per neuro. therapy.
control HR anticoagulation.
physiotherapy
physiotherapy.
physiotherapy.
rate control
rate control and anticoagulation.
rate control.
tight glycemic control

## 2017-08-28 NOTE — PROGRESS NOTE ADULT - PROBLEM SELECTOR PLAN 7
Weakness of the left upper extremity.  MRI of C spine
Weakness of the left upper extremity.  MRI of brain negative.  Repeat ct head negative. Continue physical therapy.
Weakness of the left upper extremity.  MRI of brain negative.  Repeat ct head negative. Continue physical therapy.
Weakness of the left upper extremity.  MRI of brain negative.  Repeat ct head negative. MRI cervical spine negative. Continue physical therapy.
Weakness of the left upper extremity.  MRI of brain.
Weakness of the left upper extremity; able to flex elbow and hold elbow up, able to .  MRI of brain negative.  Repeat ct head negative. MRI cervical spine negative. Continue physical therapy.
Weakness of the left upper extremity.  MRI of C spine neg  repeat ct head  PT
Weakness of the left upper extremity.  MRI of brain negative.  Repeat ct head negative. MRI cervical spine negative. Continue physical therapy.
Weakness of the left upper extremity.  MRI of brain.

## 2017-08-28 NOTE — PROGRESS NOTE ADULT - SUBJECTIVE AND OBJECTIVE BOX
65 y/o M w hx of DM, htn, presents w fever and abd pain x 1 week; reports the pain is in bilateral lower quadrant; denies vomiting; denies diarrhea; denies urinary symptoms; + cough; denies CP/SOB; denies lower ext swelling; no headache or neck stiffness (12 Aug 2017 15:51)  here last high tempt on 8/13; then 100.8 on 8/14  now afebrile x > 1 week   main issues is left weakness  off antibiotics x 48 hours and stable       Allergies    No Known Allergies    Intolerances        MEDICATIONS  (STANDING):  aspirin enteric coated 81 milliGRAM(s) Oral daily  ATENolol  Tablet 50 milliGRAM(s) Oral daily  insulin lispro (HumaLOG) corrective regimen sliding scale   SubCutaneous three times a day before meals  dextrose 5%. 1000 milliLiter(s) (50 mL/Hr) IV Continuous <Continuous>  dextrose 50% Injectable 12.5 Gram(s) IV Push once  dextrose 50% Injectable 25 Gram(s) IV Push once  dextrose 50% Injectable 25 Gram(s) IV Push once  lidocaine   Patch 1 Patch Transdermal daily  buPROPion XL . 300 milliGRAM(s) Oral daily  rivaroxaban 20 milliGRAM(s) Oral <User Schedule>  losartan 100 milliGRAM(s) Oral daily  pantoprazole    Tablet 40 milliGRAM(s) Oral before breakfast    MEDICATIONS  (PRN):  dextrose Gel 1 Dose(s) Oral once PRN Blood Glucose LESS THAN 70 milliGRAM(s)/deciliter  glucagon  Injectable 1 milliGRAM(s) IntraMuscular once PRN Glucose LESS THAN 70 milligrams/deciliter  acetaminophen   Tablet 650 milliGRAM(s) Oral every 6 hours PRN For Temp greater than 38 C (100.4 F)  diphenhydrAMINE   Capsule 25 milliGRAM(s) Oral once PRN Insomnia  ondansetron Injectable 4 milliGRAM(s) IV Push every 6 hours PRN Nausea and/or Vomiting  benzocaine 15 mG/menthol 3.6 mG Lozenge 1 Lozenge Oral every 6 hours PRN Sore Throat      REVIEW OF SYSTEMS:    CONSTITUTIONAL: No fever, chills, weight loss, or fatigue  HEENT: No sore throat, runny nose, ear ache  RESPIRATORY: No cough, wheezing, No shortness of breath  CARDIOVASCULAR: No chest pain, palpitations, dizziness  GASTROINTESTINAL: No abdominal pain. No nausea, vomiting, diarrhea  GENITOURINARY: No dysuria, increase frequency, hematuria, or incontinence  NEUROLOGICAL: No headaches, memory loss  ,has  loss of strength,in left arm ;and left leg   lefg is better   numbness, or tremors, no weakness  EXTREMITY: No pedal edema BLE  SKIN: No itching, burning, rashes, or lesions     VITAL SIGNS:  T(C): 36.9 (08-28-17 @ 10:58), Max: 37.7 (08-27-17 @ 17:21)  T(F): 98.4 (08-28-17 @ 10:58), Max: 99.8 (08-27-17 @ 17:21)  HR: 67 (08-28-17 @ 12:10) (63 - 67)  BP: 117/67 (08-28-17 @ 12:10) (117/67 - 131/75)  RR: 18 (08-28-17 @ 10:58) (18 - 18)  SpO2: 97% (08-28-17 @ 12:10) (95% - 98%)  Wt(kg): --    PHYSICAL EXAM:    GENERAL: not in any distress  HEENT: Neck is supple, normocephalic, atraumatic   CHEST/LUNG: Clear to percussion bilaterally; No rales, rhonchi, wheezing  HEART: Regular rate and rhythm; No murmurs, rubs, or gallops  ABDOMEN: Soft, Nontender, Nondistended; Bowel sounds present, no rebound   EXTREMITIES:  2+ Peripheral Pulses, No clubbing, cyanosis, or edema  left weakness getting better  SKIN: No rashes or lesions  BACK: no pressor sore   NERVOUS SYSTEM:  Alert & Oriented X3, Good concentration  PSYCH: normal affect     LABS:                                                   Radiology:

## 2017-08-28 NOTE — PROGRESS NOTE ADULT - ASSESSMENT
fever resolved   acute neuro abnormalities ?? from ; neuro follow up needed  resolved sbo   left sided weakness ;    unclear source of  fever and Altered Mental Status ? sepsis on admission   all resolved   stable off antibiotics   i will follow up as needed   neuro follow up

## 2017-08-28 NOTE — PROGRESS NOTE ADULT - PROVIDER SPECIALTY LIST ADULT
Cardiology
Infectious Disease
Internal Medicine
Neurology
Surgery
Infectious Disease
Internal Medicine
Surgery

## 2017-08-28 NOTE — PROGRESS NOTE ADULT - PROBLEM SELECTOR PROBLEM 7
CVA (cerebral vascular accident)

## 2017-08-28 NOTE — PROGRESS NOTE ADULT - PROBLEM SELECTOR PLAN 6
continue heparin continue b blocker.
continue heparin continue b blocker.,asa
continue heparin continue b blocker.

## 2017-08-28 NOTE — PROGRESS NOTE ADULT - PROBLEM SELECTOR PROBLEM 5
Atrial fibrillation
Lower abdominal pain
Atrial fibrillation

## 2017-08-28 NOTE — PROGRESS NOTE ADULT - PROBLEM SELECTOR PROBLEM 6
NSTEMI (non-ST elevated myocardial infarction)

## 2017-08-28 NOTE — PROGRESS NOTE ADULT - PROBLEM SELECTOR PROBLEM 8
Partial small bowel obstruction

## 2017-08-28 NOTE — PROGRESS NOTE ADULT - PROBLEM SELECTOR PLAN 9
neutraphos given.  Phosphate normal.  Discontinue phophate.
neutraphos

## 2017-08-28 NOTE — PROGRESS NOTE ADULT - PROBLEM SELECTOR PROBLEM 2
Sepsis
Sepsis
Atrial fibrillation, unspecified type
Sepsis
Atrial fibrillation
Atrial fibrillation, unspecified type
CVA (cerebral vascular accident)
Cerebrovascular accident (CVA), unspecified mechanism
Sepsis

## 2017-08-31 DIAGNOSIS — R41.0 DISORIENTATION, UNSPECIFIED: ICD-10-CM

## 2017-08-31 DIAGNOSIS — K56.60 UNSPECIFIED INTESTINAL OBSTRUCTION: ICD-10-CM

## 2017-08-31 DIAGNOSIS — I48.91 UNSPECIFIED ATRIAL FIBRILLATION: ICD-10-CM

## 2017-08-31 DIAGNOSIS — Z79.01 LONG TERM (CURRENT) USE OF ANTICOAGULANTS: ICD-10-CM

## 2017-08-31 DIAGNOSIS — F41.9 ANXIETY DISORDER, UNSPECIFIED: ICD-10-CM

## 2017-08-31 DIAGNOSIS — N20.0 CALCULUS OF KIDNEY: ICD-10-CM

## 2017-08-31 DIAGNOSIS — K56.7 ILEUS, UNSPECIFIED: ICD-10-CM

## 2017-08-31 DIAGNOSIS — E11.9 TYPE 2 DIABETES MELLITUS WITHOUT COMPLICATIONS: ICD-10-CM

## 2017-08-31 DIAGNOSIS — E43 UNSPECIFIED SEVERE PROTEIN-CALORIE MALNUTRITION: ICD-10-CM

## 2017-08-31 DIAGNOSIS — A41.9 SEPSIS, UNSPECIFIED ORGANISM: ICD-10-CM

## 2017-08-31 DIAGNOSIS — R41.82 ALTERED MENTAL STATUS, UNSPECIFIED: ICD-10-CM

## 2017-08-31 DIAGNOSIS — T42.75XA ADVERSE EFFECT OF UNSPECIFIED ANTIEPILEPTIC AND SEDATIVE-HYPNOTIC DRUGS, INITIAL ENCOUNTER: ICD-10-CM

## 2017-08-31 DIAGNOSIS — I63.8 OTHER CEREBRAL INFARCTION: ICD-10-CM

## 2017-08-31 DIAGNOSIS — F32.9 MAJOR DEPRESSIVE DISORDER, SINGLE EPISODE, UNSPECIFIED: ICD-10-CM

## 2017-08-31 DIAGNOSIS — I10 ESSENTIAL (PRIMARY) HYPERTENSION: ICD-10-CM

## 2017-08-31 DIAGNOSIS — G81.94 HEMIPLEGIA, UNSPECIFIED AFFECTING LEFT NONDOMINANT SIDE: ICD-10-CM

## 2017-08-31 DIAGNOSIS — E83.39 OTHER DISORDERS OF PHOSPHORUS METABOLISM: ICD-10-CM

## 2017-08-31 DIAGNOSIS — I21.4 NON-ST ELEVATION (NSTEMI) MYOCARDIAL INFARCTION: ICD-10-CM

## 2017-09-26 ENCOUNTER — EMERGENCY (EMERGENCY)
Facility: HOSPITAL | Age: 67
LOS: 0 days | Discharge: HOME | End: 2017-09-27
Attending: EMERGENCY MEDICINE
Payer: COMMERCIAL

## 2017-09-26 VITALS
HEART RATE: 102 BPM | TEMPERATURE: 98 F | DIASTOLIC BLOOD PRESSURE: 92 MMHG | HEIGHT: 72 IN | SYSTOLIC BLOOD PRESSURE: 153 MMHG | OXYGEN SATURATION: 96 % | RESPIRATION RATE: 20 BRPM | WEIGHT: 149.03 LBS

## 2017-09-26 LAB
ALBUMIN SERPL ELPH-MCNC: 3.3 G/DL — SIGNIFICANT CHANGE UP (ref 3.3–5)
ALP SERPL-CCNC: 81 U/L — SIGNIFICANT CHANGE UP (ref 40–120)
ALT FLD-CCNC: 37 U/L — SIGNIFICANT CHANGE UP (ref 12–78)
ANION GAP SERPL CALC-SCNC: 13 MMOL/L — SIGNIFICANT CHANGE UP (ref 5–17)
APPEARANCE UR: CLEAR — SIGNIFICANT CHANGE UP
APTT BLD: 36.5 SEC — SIGNIFICANT CHANGE UP (ref 27.5–37.4)
AST SERPL-CCNC: 15 U/L — SIGNIFICANT CHANGE UP (ref 15–37)
BASOPHILS # BLD AUTO: 0.1 K/UL — SIGNIFICANT CHANGE UP (ref 0–0.2)
BASOPHILS NFR BLD AUTO: 0.9 % — SIGNIFICANT CHANGE UP (ref 0–2)
BILIRUB SERPL-MCNC: 1 MG/DL — SIGNIFICANT CHANGE UP (ref 0.2–1.2)
BILIRUB UR-MCNC: NEGATIVE — SIGNIFICANT CHANGE UP
BUN SERPL-MCNC: 14 MG/DL — SIGNIFICANT CHANGE UP (ref 7–23)
CALCIUM SERPL-MCNC: 8.4 MG/DL — LOW (ref 8.5–10.1)
CHLORIDE SERPL-SCNC: 101 MMOL/L — SIGNIFICANT CHANGE UP (ref 96–108)
CO2 SERPL-SCNC: 22 MMOL/L — SIGNIFICANT CHANGE UP (ref 22–31)
COLOR SPEC: YELLOW — SIGNIFICANT CHANGE UP
CREAT SERPL-MCNC: 0.83 MG/DL — SIGNIFICANT CHANGE UP (ref 0.5–1.3)
DIFF PNL FLD: ABNORMAL
EOSINOPHIL # BLD AUTO: 0 K/UL — SIGNIFICANT CHANGE UP (ref 0–0.5)
EOSINOPHIL NFR BLD AUTO: 0.3 % — SIGNIFICANT CHANGE UP (ref 0–6)
GLUCOSE SERPL-MCNC: 148 MG/DL — HIGH (ref 70–99)
GLUCOSE UR QL: NEGATIVE MG/DL — SIGNIFICANT CHANGE UP
HCT VFR BLD CALC: 37.3 % — LOW (ref 39–50)
HGB BLD-MCNC: 13.3 G/DL — SIGNIFICANT CHANGE UP (ref 13–17)
INR BLD: 1.68 RATIO — HIGH (ref 0.88–1.16)
KETONES UR-MCNC: ABNORMAL
LACTATE SERPL-SCNC: 0.9 MMOL/L — SIGNIFICANT CHANGE UP (ref 0.7–2)
LEUKOCYTE ESTERASE UR-ACNC: NEGATIVE — SIGNIFICANT CHANGE UP
LYMPHOCYTES # BLD AUTO: 0.5 K/UL — LOW (ref 1–3.3)
LYMPHOCYTES # BLD AUTO: 4.3 % — LOW (ref 13–44)
MCHC RBC-ENTMCNC: 32.1 PG — SIGNIFICANT CHANGE UP (ref 27–34)
MCHC RBC-ENTMCNC: 35.8 GM/DL — SIGNIFICANT CHANGE UP (ref 32–36)
MCV RBC AUTO: 89.7 FL — SIGNIFICANT CHANGE UP (ref 80–100)
MONOCYTES # BLD AUTO: 0.6 K/UL — SIGNIFICANT CHANGE UP (ref 0–0.9)
MONOCYTES NFR BLD AUTO: 5.1 % — SIGNIFICANT CHANGE UP (ref 2–14)
NEUTROPHILS # BLD AUTO: 10.8 K/UL — HIGH (ref 1.8–7.4)
NEUTROPHILS NFR BLD AUTO: 89.3 % — HIGH (ref 43–77)
NITRITE UR-MCNC: NEGATIVE — SIGNIFICANT CHANGE UP
PH UR: 7 — SIGNIFICANT CHANGE UP (ref 5–8)
PLATELET # BLD AUTO: 275 K/UL — SIGNIFICANT CHANGE UP (ref 150–400)
POTASSIUM SERPL-MCNC: 3.4 MMOL/L — LOW (ref 3.5–5.3)
POTASSIUM SERPL-SCNC: 3.4 MMOL/L — LOW (ref 3.5–5.3)
PROT SERPL-MCNC: 6.6 GM/DL — SIGNIFICANT CHANGE UP (ref 6–8.3)
PROT UR-MCNC: NEGATIVE MG/DL — SIGNIFICANT CHANGE UP
PROTHROM AB SERPL-ACNC: 18.5 SEC — HIGH (ref 9.8–12.7)
RBC # BLD: 4.16 M/UL — LOW (ref 4.2–5.8)
RBC # FLD: 13.4 % — SIGNIFICANT CHANGE UP (ref 11–15)
SODIUM SERPL-SCNC: 136 MMOL/L — SIGNIFICANT CHANGE UP (ref 135–145)
SP GR SPEC: 1 — LOW (ref 1.01–1.02)
UROBILINOGEN FLD QL: NEGATIVE MG/DL — SIGNIFICANT CHANGE UP
WBC # BLD: 12.1 K/UL — HIGH (ref 3.8–10.5)
WBC # FLD AUTO: 12.1 K/UL — HIGH (ref 3.8–10.5)

## 2017-09-26 PROCEDURE — 99284 EMERGENCY DEPT VISIT MOD MDM: CPT

## 2017-09-26 NOTE — ED ADULT TRIAGE NOTE - CHIEF COMPLAINT QUOTE
pt sent from [facility] for vomiting and urinary retention.  bladder scan at [facility] showed approx 898ml urine.

## 2017-09-26 NOTE — ED ADULT NURSE NOTE - CAS EDN DISCHARGE INTERVENTIONS
JUAN Guzman inform me to leave in IV incase they need IV meds at [facility]

## 2017-09-26 NOTE — ED ADULT NURSE NOTE - OBJECTIVE STATEMENT
Pt is 67 y/o male pmh CVA ( left -side weakness) sent from [facility] for urine rentention with 3 episode of vomiting . Pt denies abdominal pain or diarrhea.

## 2017-09-26 NOTE — ED ADULT NURSE NOTE - CHIEF COMPLAINT QUOTE
pt sent from [facility] for vomiting and urinary retention.  bladder scan at [facility] showed approx 898ml urine.

## 2017-09-27 VITALS
HEART RATE: 85 BPM | RESPIRATION RATE: 16 BRPM | DIASTOLIC BLOOD PRESSURE: 80 MMHG | OXYGEN SATURATION: 95 % | SYSTOLIC BLOOD PRESSURE: 115 MMHG | TEMPERATURE: 98 F

## 2017-09-27 DIAGNOSIS — R10.30 LOWER ABDOMINAL PAIN, UNSPECIFIED: ICD-10-CM

## 2017-09-27 DIAGNOSIS — R33.9 RETENTION OF URINE, UNSPECIFIED: ICD-10-CM

## 2017-09-27 DIAGNOSIS — R11.10 VOMITING, UNSPECIFIED: ICD-10-CM

## 2017-09-27 DIAGNOSIS — Z79.82 LONG TERM (CURRENT) USE OF ASPIRIN: ICD-10-CM

## 2017-09-27 DIAGNOSIS — I10 ESSENTIAL (PRIMARY) HYPERTENSION: ICD-10-CM

## 2017-09-27 DIAGNOSIS — E11.9 TYPE 2 DIABETES MELLITUS WITHOUT COMPLICATIONS: ICD-10-CM

## 2017-09-27 LAB
BACTERIA # UR AUTO: ABNORMAL
RBC CASTS # UR COMP ASSIST: SIGNIFICANT CHANGE UP /HPF (ref 0–4)
WBC UR QL: SIGNIFICANT CHANGE UP

## 2017-09-27 PROCEDURE — 74020: CPT | Mod: 26

## 2017-09-27 RX ORDER — POTASSIUM CHLORIDE 20 MEQ
40 PACKET (EA) ORAL ONCE
Qty: 0 | Refills: 0 | Status: COMPLETED | OUTPATIENT
Start: 2017-09-27 | End: 2017-09-27

## 2017-09-27 RX ADMIN — Medication 40 MILLIEQUIVALENT(S): at 00:36

## 2017-09-27 NOTE — ED PROVIDER NOTE - OBJECTIVE STATEMENT
Pertinent PMH/PSH/FHx/SHx and Review of Systems contained within:  Patient presents to the ED for   urinary retention and vomiting.  Patient comes from [facility] where he is admitted for acute rehab following MCA CVA.  Was able to urinate about 10 hours ago, since then has had urinary retention with lower abdominal discomfort and swelling.  Denies any active nausea, vomiting, but en route to ER had 3 episodes of mild nonbilious, nonbloody vomit.  Denies any diarrhea or testicular pain.  Patient denies history of urinary retention.  Per [facility] chart, obstruction xray series was recommended.    Relevant PMHx/SHx/SOCHx/FAMH:  HTN, DM, NSTEMI, anxiety, depression, CVA/MCA w/ residual left sided weakness  Patient denies EtOH/tobacco/illicit substance use.  PMD: Dr. Davis    ROS: No fever/chills, No headache/photophobia/eye pain/changes in vision, No ear pain/sore throat/dysphagia, No chest pain/palpitations, no SOB/cough/wheeze/stridor, No N/V/D/melena, no dysuria/frequency/discharge, No neck/back pain, no rash, no changes in neurological status/function.

## 2017-09-27 NOTE — ED PROVIDER NOTE - CONSTITUTIONAL MANNER
Medication pending, pharmacy loaded. states wife has enough supplies to last her only 1 week.     appropriate for situation

## 2017-09-27 NOTE — ED PROVIDER NOTE - CARE PLAN
Principal Discharge DX:	Urinary retention  Secondary Diagnosis:	Non-intractable vomiting without nausea, unspecified vomiting type

## 2017-09-27 NOTE — ED PROVIDER NOTE - MEDICAL DECISION MAKING DETAILS
Patient with urinary retention and vomiting.  VSS.  Immediate relief with walsh catheter, large urine removed.  Patient's abdominal exam is benign after walsh insertion, no active vomiting or nausea. Labs, UA reviewed, abdominal series w/normal loops of bowel, no air-fluid levels or distended bowel appreciated, low suspicion for abdominal obstruction.  Recommend urology follow up, will return to [facility] for now.  Discussed results and outcome of today's visit with the patient.  Patient advised to please follow up with another healthcare provider within the next 24 hours and return to the Emergency Department for worsening symptoms or any other concerns.  Patient advised that their doctor may call  to follow up on the specific results of the tests performed today in the emergency department.   Patient appears well on discharge.

## 2017-09-29 LAB
-  AMIKACIN: SIGNIFICANT CHANGE UP
-  AMPICILLIN/SULBACTAM: SIGNIFICANT CHANGE UP
-  AMPICILLIN: SIGNIFICANT CHANGE UP
-  AZTREONAM: SIGNIFICANT CHANGE UP
-  CEFAZOLIN: SIGNIFICANT CHANGE UP
-  CEFEPIME: SIGNIFICANT CHANGE UP
-  CEFOXITIN: SIGNIFICANT CHANGE UP
-  CEFTAZIDIME: SIGNIFICANT CHANGE UP
-  CEFTRIAXONE: SIGNIFICANT CHANGE UP
-  CIPROFLOXACIN: SIGNIFICANT CHANGE UP
-  ERTAPENEM: SIGNIFICANT CHANGE UP
-  GENTAMICIN: SIGNIFICANT CHANGE UP
-  LEVOFLOXACIN: SIGNIFICANT CHANGE UP
-  MEROPENEM: SIGNIFICANT CHANGE UP
-  NITROFURANTOIN: SIGNIFICANT CHANGE UP
-  PIPERACILLIN/TAZOBACTAM: SIGNIFICANT CHANGE UP
-  TOBRAMYCIN: SIGNIFICANT CHANGE UP
-  TRIMETHOPRIM/SULFAMETHOXAZOLE: SIGNIFICANT CHANGE UP
CULTURE RESULTS: SIGNIFICANT CHANGE UP
METHOD TYPE: SIGNIFICANT CHANGE UP
ORGANISM # SPEC MICROSCOPIC CNT: SIGNIFICANT CHANGE UP
ORGANISM # SPEC MICROSCOPIC CNT: SIGNIFICANT CHANGE UP
SPECIMEN SOURCE: SIGNIFICANT CHANGE UP

## 2017-09-30 ENCOUNTER — EMERGENCY (EMERGENCY)
Facility: HOSPITAL | Age: 67
LOS: 0 days | Discharge: HOME | End: 2017-09-30
Attending: EMERGENCY MEDICINE
Payer: COMMERCIAL

## 2017-09-30 VITALS
SYSTOLIC BLOOD PRESSURE: 158 MMHG | RESPIRATION RATE: 20 BRPM | TEMPERATURE: 98 F | HEIGHT: 72 IN | HEART RATE: 94 BPM | OXYGEN SATURATION: 100 % | WEIGHT: 149.03 LBS | DIASTOLIC BLOOD PRESSURE: 98 MMHG

## 2017-09-30 VITALS
TEMPERATURE: 98 F | HEART RATE: 87 BPM | DIASTOLIC BLOOD PRESSURE: 75 MMHG | OXYGEN SATURATION: 97 % | RESPIRATION RATE: 18 BRPM | SYSTOLIC BLOOD PRESSURE: 131 MMHG

## 2017-09-30 DIAGNOSIS — Z79.82 LONG TERM (CURRENT) USE OF ASPIRIN: ICD-10-CM
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
## 2017-09-30 DIAGNOSIS — E11.9 TYPE 2 DIABETES MELLITUS WITHOUT COMPLICATIONS: ICD-10-CM

## 2017-09-30 DIAGNOSIS — R33.9 RETENTION OF URINE, UNSPECIFIED: ICD-10-CM

## 2017-09-30 DIAGNOSIS — I10 ESSENTIAL (PRIMARY) HYPERTENSION: ICD-10-CM

## 2017-09-30 DIAGNOSIS — T83.038A LEAKAGE OF OTHER URINARY CATHETER, INITIAL ENCOUNTER: ICD-10-CM

## 2017-09-30 PROCEDURE — 99283 EMERGENCY DEPT VISIT LOW MDM: CPT | Mod: 25

## 2017-09-30 NOTE — ED PROVIDER NOTE - OBJECTIVE STATEMENT
Pertinent PMH/PSH/FHx/SHx and Review of Systems contained within:  65yo M pmh inclusive of htn, DM, STEMI, CVA w/ residual left hemiparesis, seen in our ED 4 days ago for urinary retention (2/2 to suspected BPH- walsh cath placed and pt discharged to [facility]), sent from [facility] for walsh cath issue. Pt reports he was "doing just fine" until approx 1:30am today when walsh would not drain and urine instead began to leak from tip of penis, associated w/ suprapubic pressure/pain. Pt was sent here, and triage nurse changed walsh before I assessed pt. Pt stating that all symptoms have now resolved. UA when pt seen here 4days ago was negative for infection. No fever/chills, No photophobia/eye pain/changes in vision, No ear pain/sore throat/dysphagia, No chest pain/palpitations, no SOB/cough/wheeze/stridor, No N/V/D, no dysuria/frequency/discharge, No neck/back pain, no rash.

## 2017-09-30 NOTE — ED PROVIDER NOTE - MEDICAL DECISION MAKING DETAILS
67yo M pmh as above here for walsh cath problem. Had large sediment in walsh cath, same changed out by triage nurse and symptoms now resolved. Instructed pt and family member on discussing typical troubleshooting with [facility] staff (ie-irrigating walsh cath), and need for f/u with urologist. Will d/c back to [facility].

## 2017-09-30 NOTE — ED PROVIDER NOTE - PHYSICAL EXAMINATION
Gen: Alert, NAD  Head: NC, AT, PERRL, EOMI, normal lids/conjunctiva  ENT:  normal hearing, patent oropharynx without erythema/exudate, uvula midline  Chest: no chest wall tenderness, equal chest rise  Pulm: Bilateral BS, normal resp effort, no wheeze/stridor/retractions  CV: RRR, no M/R/G, +dist pulses  Abd: soft, NT/ND, +BS, no hepatosplenomegaly  Rectal: deferred  Mskel: no edema/erythema/cyanosis  Skin: no rash  Neuro: AAOx3, 4/5 strength Left UE/LE, CN 2-12 intact

## 2017-10-18 ENCOUNTER — EMERGENCY (EMERGENCY)
Facility: HOSPITAL | Age: 67
LOS: 0 days | Discharge: HOME | End: 2017-10-18
Attending: EMERGENCY MEDICINE
Payer: COMMERCIAL

## 2017-10-18 VITALS
OXYGEN SATURATION: 100 % | DIASTOLIC BLOOD PRESSURE: 80 MMHG | RESPIRATION RATE: 17 BRPM | TEMPERATURE: 98 F | HEART RATE: 78 BPM | SYSTOLIC BLOOD PRESSURE: 128 MMHG

## 2017-10-18 VITALS
TEMPERATURE: 98 F | RESPIRATION RATE: 17 BRPM | SYSTOLIC BLOOD PRESSURE: 130 MMHG | OXYGEN SATURATION: 99 % | DIASTOLIC BLOOD PRESSURE: 85 MMHG | HEART RATE: 72 BPM

## 2017-10-18 LAB
ALBUMIN SERPL ELPH-MCNC: 2.9 G/DL — LOW (ref 3.3–5)
ALP SERPL-CCNC: 110 U/L — SIGNIFICANT CHANGE UP (ref 40–120)
ALT FLD-CCNC: 90 U/L — HIGH (ref 12–78)
ANION GAP SERPL CALC-SCNC: 10 MMOL/L — SIGNIFICANT CHANGE UP (ref 5–17)
APPEARANCE UR: ABNORMAL
APTT BLD: 39.7 SEC — HIGH (ref 27.5–37.4)
AST SERPL-CCNC: 52 U/L — HIGH (ref 15–37)
BACTERIA # UR AUTO: ABNORMAL
BASOPHILS # BLD AUTO: 0.1 K/UL — SIGNIFICANT CHANGE UP (ref 0–0.2)
BASOPHILS NFR BLD AUTO: 1.2 % — SIGNIFICANT CHANGE UP (ref 0–2)
BILIRUB SERPL-MCNC: 0.4 MG/DL — SIGNIFICANT CHANGE UP (ref 0.2–1.2)
BILIRUB UR-MCNC: NEGATIVE — SIGNIFICANT CHANGE UP
BUN SERPL-MCNC: 15 MG/DL — SIGNIFICANT CHANGE UP (ref 7–23)
CALCIUM SERPL-MCNC: 8.5 MG/DL — SIGNIFICANT CHANGE UP (ref 8.5–10.1)
CHLORIDE SERPL-SCNC: 108 MMOL/L — SIGNIFICANT CHANGE UP (ref 96–108)
CO2 SERPL-SCNC: 24 MMOL/L — SIGNIFICANT CHANGE UP (ref 22–31)
COLOR SPEC: YELLOW — SIGNIFICANT CHANGE UP
CREAT SERPL-MCNC: 0.54 MG/DL — SIGNIFICANT CHANGE UP (ref 0.5–1.3)
DIFF PNL FLD: ABNORMAL
EOSINOPHIL # BLD AUTO: 0.2 K/UL — SIGNIFICANT CHANGE UP (ref 0–0.5)
EOSINOPHIL NFR BLD AUTO: 2.2 % — SIGNIFICANT CHANGE UP (ref 0–6)
GLUCOSE SERPL-MCNC: 131 MG/DL — HIGH (ref 70–99)
GLUCOSE UR QL: NEGATIVE MG/DL — SIGNIFICANT CHANGE UP
HCT VFR BLD CALC: 35.9 % — LOW (ref 39–50)
HGB BLD-MCNC: 12.1 G/DL — LOW (ref 13–17)
INR BLD: 1.58 RATIO — HIGH (ref 0.88–1.16)
KETONES UR-MCNC: ABNORMAL
LEUKOCYTE ESTERASE UR-ACNC: ABNORMAL
LYMPHOCYTES # BLD AUTO: 1.1 K/UL — SIGNIFICANT CHANGE UP (ref 1–3.3)
LYMPHOCYTES # BLD AUTO: 14.3 % — SIGNIFICANT CHANGE UP (ref 13–44)
MCHC RBC-ENTMCNC: 31.5 PG — SIGNIFICANT CHANGE UP (ref 27–34)
MCHC RBC-ENTMCNC: 33.8 GM/DL — SIGNIFICANT CHANGE UP (ref 32–36)
MCV RBC AUTO: 93.3 FL — SIGNIFICANT CHANGE UP (ref 80–100)
MONOCYTES # BLD AUTO: 0.5 K/UL — SIGNIFICANT CHANGE UP (ref 0–0.9)
MONOCYTES NFR BLD AUTO: 5.9 % — SIGNIFICANT CHANGE UP (ref 2–14)
NEUTROPHILS # BLD AUTO: 6.1 K/UL — SIGNIFICANT CHANGE UP (ref 1.8–7.4)
NEUTROPHILS NFR BLD AUTO: 76.4 % — SIGNIFICANT CHANGE UP (ref 43–77)
NITRITE UR-MCNC: NEGATIVE — SIGNIFICANT CHANGE UP
PH UR: 6 — SIGNIFICANT CHANGE UP (ref 5–8)
PLATELET # BLD AUTO: 304 K/UL — SIGNIFICANT CHANGE UP (ref 150–400)
POTASSIUM SERPL-MCNC: 4.3 MMOL/L — SIGNIFICANT CHANGE UP (ref 3.5–5.3)
POTASSIUM SERPL-SCNC: 4.3 MMOL/L — SIGNIFICANT CHANGE UP (ref 3.5–5.3)
PROT SERPL-MCNC: 6.5 GM/DL — SIGNIFICANT CHANGE UP (ref 6–8.3)
PROT UR-MCNC: 30 MG/DL
PROTHROM AB SERPL-ACNC: 17.4 SEC — HIGH (ref 9.8–12.7)
RBC # BLD: 3.85 M/UL — LOW (ref 4.2–5.8)
RBC # FLD: 13.3 % — SIGNIFICANT CHANGE UP (ref 11–15)
RBC CASTS # UR COMP ASSIST: >50 /HPF (ref 0–4)
SODIUM SERPL-SCNC: 142 MMOL/L — SIGNIFICANT CHANGE UP (ref 135–145)
SP GR SPEC: 1.01 — SIGNIFICANT CHANGE UP (ref 1.01–1.02)
UROBILINOGEN FLD QL: NEGATIVE MG/DL — SIGNIFICANT CHANGE UP
WBC # BLD: 7.9 K/UL — SIGNIFICANT CHANGE UP (ref 3.8–10.5)
WBC # FLD AUTO: 7.9 K/UL — SIGNIFICANT CHANGE UP (ref 3.8–10.5)
WBC UR QL: ABNORMAL

## 2017-10-18 PROCEDURE — 74176 CT ABD & PELVIS W/O CONTRAST: CPT | Mod: 26

## 2017-10-18 PROCEDURE — 99284 EMERGENCY DEPT VISIT MOD MDM: CPT

## 2017-10-18 RX ORDER — TELMISARTAN 20 MG/1
1 TABLET ORAL
Qty: 0 | Refills: 0 | COMMUNITY

## 2017-10-18 NOTE — ED PROVIDER NOTE - MEDICAL DECISION MAKING DETAILS
Pt well appearing, VSS, Lab values do not require emergent intervention. CT scan demonstrates no acute pathology. UA clear without blood. Zackery dose with po abx. Discussed results and outcome of testing with the patient.  Patient given copy of available results. Patient advised to please follow up with their PMD within the next 24 hours and return to the Emergency Department for worsening symptoms or any other concerns.

## 2017-10-18 NOTE — ED ADULT NURSE NOTE - CHIEF COMPLAINT QUOTE
pt sent from [facility] for hematuria since this afternoon. pt denies any abdominal pain. pt has history of bph, urinary retention. pt has Dudley catheter.

## 2017-10-18 NOTE — ED PROVIDER NOTE - OBJECTIVE STATEMENT
65yo male with pmh HTN, DM, CAD, afib, CVA with Lt hemiparesis, recent admission for urinary retention and walsh, from [facility] presents as noted some hematuria this am that has not cleared. Pt denies abd pain. Pt had recent walsh change this weekend. no fever, chills, n, v.     ROS: No fever/chills. No photophobia/eye pain/changes in vision, No ear pain/sore throat/dysphagia, No chest pain/palpitations. No SOB/cough/stridor. No abdominal pain, N/V/D, no black/bloody bm. No dysuria/frequency/discharge/+hematuria, No headache. No Dizziness.  No rash.  No numbness/tingling/weakness. 65yo male with pmh HTN, DM, CAD, afib, CVA with Lt hemiparesis, recent admission for CVA with resultant urinary retention and walsh x 2 weeks, presents as noted some hematuria while transfering but cleared on arrival in ER. Pt denies abd pain. Pt had recent walsh change this weekend. no fever, chills, n, v, abd pain. Pt was on abx, but stopped today and was to be dc.     ROS: No fever/chills. No photophobia/eye pain/changes in vision, No ear pain/sore throat/dysphagia, No chest pain/palpitations. No SOB/cough/stridor. No abdominal pain, N/V/D, no black/bloody bm. No dysuria/frequency/discharge/+hematuria, No headache. No Dizziness.  No rash.  No numbness/tingling/weakness. 65yo male with pmh HTN, DM, CAD, afib, CVA with Lt hemiparesis on xarelto, recent admission for CVA with resultant urinary retention and walsh x 2 weeks, presents as noted some hematuria while transfering but cleared on arrival in ER. Pt denies abd pain. Pt had recent walsh change this weekend. no fever, chills, n, v, abd pain. Pt was on abx, but stopped today and was to be dc.     ROS: No fever/chills. No photophobia/eye pain/changes in vision, No ear pain/sore throat/dysphagia, No chest pain/palpitations. No SOB/cough/stridor. No abdominal pain, N/V/D, no black/bloody bm. No dysuria/frequency/discharge/+hematuria, No headache. No Dizziness.  No rash.  No numbness/tingling/weakness.

## 2017-10-18 NOTE — ED ADULT TRIAGE NOTE - CHIEF COMPLAINT QUOTE
pt sent from [facility] for hematuria since this afternoon. pt denies any abdominal pain. pt has history of bph, urinary retention. pt has Dudley catheter.

## 2017-10-18 NOTE — ED ADULT NURSE NOTE - OBJECTIVE STATEMENT
Patient alert, family at bedside stating that he had painless bleeding coming from the Dudley catheter, which has resided currently. Patient states that the acute bleeding started right after the staff transferred him from the bed to his chair to use the bathroom. Patient denies any pain or burning.

## 2017-10-18 NOTE — ED PROVIDER NOTE - CARE PLAN
Principal Discharge DX:	UTI (urinary tract infection) Principal Discharge DX:	UTI (urinary tract infection)  Secondary Diagnosis:	Hematuria

## 2017-10-18 NOTE — ED PROVIDER NOTE - PHYSICAL EXAMINATION
Gen: Alert, Well appearing. NAD    Head: NC, AT, PERRL, EOMI, normal lids/conjunctiva   ENT: Bilateral TM WNL, normal hearing, patent oropharynx without erythema/exudate, uvula midline  Neck: supple, no tenderness/meningismus/JVD   Pulm: Bilateral clear BS, normal resp effort, no wheeze/stridor/retractions  CV: RRR, no M/R/G, +dist pulses   Abd: soft, NT/ND, +BS, no guarding/rebound tenderness  Mskel: no edema/erythema/cyanosis   Skin: no rash   Neuro: AAOx3, Gen: Alert, Well appearing. NAD    Head: NC, AT, PERRL, EOMI, normal lids/conjunctiva   ENT: Bilateral TM WNL, normal hearing, patent oropharynx without erythema/exudate, uvula midline  Neck: supple, no tenderness/meningismus/JVD   Pulm: Bilateral clear BS, normal resp effort, no wheeze/stridor/retractions  CV: RRR, no M/R/G, +dist pulses   Abd: soft, NT/ND, +BS, no guarding/rebound tenderness  Dudley: cloudy urine in tube, dark blood in bag.  Mskel: no edema/erythema/cyanosis   Skin: no rash   Neuro: AAOx3

## 2017-10-18 NOTE — ED ADULT NURSE REASSESSMENT NOTE - NS ED NURSE REASSESS COMMENT FT1
Spoke with Keshia, supervisor of [facility], okay to bring patient back to [facility] first floor after discharge from ED

## 2017-10-19 LAB
CULTURE RESULTS: SIGNIFICANT CHANGE UP
SPECIMEN SOURCE: SIGNIFICANT CHANGE UP

## 2017-10-20 DIAGNOSIS — R31.9 HEMATURIA, UNSPECIFIED: ICD-10-CM

## 2017-10-20 DIAGNOSIS — Z86.73 PERSONAL HISTORY OF TRANSIENT ISCHEMIC ATTACK (TIA), AND CEREBRAL INFARCTION WITHOUT RESIDUAL DEFICITS: ICD-10-CM

## 2017-10-20 DIAGNOSIS — I25.10 ATHEROSCLEROTIC HEART DISEASE OF NATIVE CORONARY ARTERY WITHOUT ANGINA PECTORIS: ICD-10-CM

## 2017-10-20 DIAGNOSIS — G81.90 HEMIPLEGIA, UNSPECIFIED AFFECTING UNSPECIFIED SIDE: ICD-10-CM

## 2017-10-20 DIAGNOSIS — E11.9 TYPE 2 DIABETES MELLITUS WITHOUT COMPLICATIONS: ICD-10-CM

## 2017-10-20 DIAGNOSIS — I48.91 UNSPECIFIED ATRIAL FIBRILLATION: ICD-10-CM

## 2017-10-20 DIAGNOSIS — N39.0 URINARY TRACT INFECTION, SITE NOT SPECIFIED: ICD-10-CM

## 2017-10-20 DIAGNOSIS — I10 ESSENTIAL (PRIMARY) HYPERTENSION: ICD-10-CM

## 2020-06-30 NOTE — PROGRESS NOTE ADULT - SUBJECTIVE AND OBJECTIVE BOX
Subjective Complaints:  Historian:    66 y o man with left upper extremity weakness      REVIEW OF SYSTEMS:  Eyes:  Good vision, no reported pain  ENT:  No sore throat, pain, runny nose, dysphagia  CV:  No pain, palpitatioins, hypo/hypertension  Resp:  No dyspnea, cough, tachypnea, wheezing  GI:  No pain, nausea, vomiting, diarrhea, constipatiion  Muscle:  No pain, weakness  Neuro:  No weakness, tingling, memory problems  Psych:  No fatigue, insomnia, mood problems, depression  Endocrine:  No polyuria, polydypsia, cold/heat intolerance    Vital Signs Last 24 Hrs  T(C): 36.6 (16 Aug 2017 04:59), Max: 37.1 (15 Aug 2017 13:57)  T(F): 97.8 (16 Aug 2017 04:59), Max: 98.8 (15 Aug 2017 13:57)  HR: 63 (16 Aug 2017 04:59) (60 - 70)  BP: 142/93 (16 Aug 2017 04:59) (142/93 - 157/88)  BP(mean): --  RR: 18 (16 Aug 2017 04:59) (18 - 18)  SpO2: 97% (16 Aug 2017 04:59) (95% - 99%)    GENERAL PHYSICAL EXAM:  General:  Appears stated age, well-groomed, well-nourished, no distress  HEENT:  NC/AT, patent nares w/ pink mucosa, OP clear w/o lesions, PERRL, EOMI, conjunctivae clear, no thyromegaly, nodules, adenopathy, no JVD  Chest:  Full & symmetric excursion, no increased effort, breath sounds clear  Cardiovascular:  Regular rhythm, S1, S2, no murmur/rub/S3/S4, no carotid/femoral/abdominal bruit, radial/pedal pulses 2+, no edema  Abdomen:  Soft, non-tender, non-distended, normoactive bowel sounds, no HSM  Extremities:  Gait & station:   Digits:   Nails:   Joints, Bones, Muscles:   ROM:   Stability:  Skin:  No rash/erythema/ecchymoses/petechiae/wounds/abscess/warm/dry  Musculoskeletal:  Full ROM in all joints w/o swelling/tenderness/effusion    NEUROLOGICAL EXAM:  HENT:  Normocephalic head; atraumatic head.  Neck supple.  ENT: normal looking.  Mental State:    Alert.  Fully oriented to person, place and date.  Coherent.  Speech clear and intact.  Cooperative.  Responds appropriately.    Cranial Nerves:  II-XII:   Pupils round and reactive to light and accommodation.  Extraocular movements full.  Visual fields full (no homonymous hemianopsia).  Visual acuity wnl.  Facial symmetry intact.  Tongue midline.  Motor Functions:  Motor strength is 2/5 in left upper extremity and 3/5 in left lower and 5/5 on the right   Sensory Functions:   Intact to touch and pinprick to face and extremities.    Reflexes:  Deep tendon reflexes normoactive to biceps, knees and ankles.  Babinski absent (present).  Cerebellar Testing:    Finger to nose intact.  Nystagmus absent.  Neurovascular: Carotid auscultation full without bruits.      LABS:                        14.2   9.5   )-----------( 214      ( 16 Aug 2017 06:35 )             40.7     08-16    139  |  104  |  17  ----------------------------<  161<H>  3.1<L>   |  23  |  0.75    Ca    8.0<L>      16 Aug 2017 06:35    TPro  6.7  /  Alb  2.6<L>  /  TBili  0.6  /  DBili  x   /  AST  47<H>  /  ALT  58  /  AlkPhos  55  08-16    PTT - ( 16 Aug 2017 06:35 )  PTT:63.2 sec        RADIOLOGY & ADDITIONAL STUDIES:  MRI of c-spine ,my reading : no epidural abscess ,no cord compression .  Provider to RN:       Hold heparin drip for MRI. Resume on return to floor (08-15 @ 08:34)  Creatine Kinase, Serum: AM Sched. Collection: 16-Aug-2017 05:00 (08-15 @ 08:34)  Troponin I, Serum: AM Sched. Collection: 16-Aug-2017 05:00 (08-15 @ 08:34)  12 Lead ECG:   Provider's Contact #: [phone] (08-15 @ 08:34)  Complete Blood Count: AM Sched. Collection: 16-Aug-2017 05:00 (08-15 @ 08:34)  Comprehensive Metabolic Panel: AM Sched. Collection: 16-Aug-2017 05:00 (08-15 @ 08:34)  potassium chloride  10 mEq/100 mL IVPB:   10 milliEquivalent(s) in IV Solution 100 milliLiter(s), IV Intermittent, every 1 hour, infuse over 60 Minute(s), Stop After 3 Doses  Provider's Contact #: [phone] (08-15 @ 10:15)  Activated Partial Thromboplastin Time:  Start Date:16-Aug-2017. AM Sched. Collection:16-Aug-2017 05:00 (08-15 @ 12:11)  meropenem IVPB: [Ordered as MERREM]  500 milliGRAM(s) in sodium chloride 0.9% 100 milliLiter(s), IV Intermittent, every 8 hours, infuse over 30 Minute(s)  Indication: sespsis  Provider's Contact #: [phone] (08-15 @ 13:45)  Complete Blood Count: AM Sched. Collection: 16-Aug-2017 05:00  Cancel Reason: Dup Cancel (08-16 @ 00:07)  Chart Check (08-16 @ 04:05)  Activated Partial Thromboplastin Time:  Start Date:17-Aug-2017. AM Sched. Collection:17-Aug-2017 05:00 (08-16 @ 07:32)   DX left hemiparesis .     Recommendations:  Continue asa ,statin .   DVT prophylaxis as ordered.  Medications: Picato Counseling:  I discussed with the patient the risks of Picato including but not limited to erythema, scaling, itching, weeping, crusting, and pain.

## 2022-02-10 NOTE — BEHAVIORAL HEALTH ASSESSMENT NOTE - NS ED BHA MED ROS ENT MOUTH
You can access the FollowMyHealth Patient Portal offered by [facility] by registering at the following website: [email or web address]. By joining [employer]’s FollowMyHealth portal, you will also be able to view your health information using other applications (apps) compatible with our system. Increase your fluid intake. Regular diet as tolerated. No complaints

## 2023-06-25 NOTE — H&P ADULT - REASON FOR ADMISSION
Summary: N/V/D  Care Plan Summary Note:  Orientation: A&OX4  Observation Goals (met & not met): Not Met  Activity Level: Ind w/ IV pole   Fall Risk: N  Behavior & Aggression Tool Color: Green  Pain Management: Chronic generalized pain, has Schduled pain meds  ABNL VS/O2: VSS on RA  ABNL Lab/BG: See chart. Stool sample need to be collect  Bowel/Bladder: Continent  Drains/Devices: IV  mL/hr  Tests/Procedures for next shift: Stool sample  Anticipated DC date: TBD      Observation goals  PRIOR TO DISCHARGE       Comments:   -diagnostic tests and consults completed and resulted -Not Met  -vital signs normal or at patient baseline -Met  Nurse to notify provider when observation goals have been met and patient is ready for discharge.               fever, abdominal pain

## 2023-09-16 NOTE — PHYSICAL THERAPY INITIAL EVALUATION ADULT - GENERAL OBSERVATIONS, REHAB EVAL
Found supine, awake, cooperative, conversant, not in distress but c/o weakness all extremities more in the LUE and LLE, nausea, no abdominal pain
stated

## 2023-10-31 NOTE — PROGRESS NOTE ADULT - SUBJECTIVE AND OBJECTIVE BOX
INTERVAL HPI/OVERNIGHT EVENTS:      Feels strong in the left upper extremity.  Moving left upper extremity.  Holding up left upper extremity, Maintaining  with left upper extremity.  Tolerating clears.  Neurology evaluation noted.  Antimicrobial:  meropenem IVPB 500 milliGRAM(s) IV Intermittent every 8 hours    Cardiovascular:  ATENolol  Tablet 50 milliGRAM(s) Oral daily  losartan 100 milliGRAM(s) Oral daily    Pulmonary:    Hematalogic:  aspirin enteric coated 81 milliGRAM(s) Oral daily  rivaroxaban 20 milliGRAM(s) Oral <User Schedule>    Other:  insulin lispro (HumaLOG) corrective regimen sliding scale   SubCutaneous three times a day before meals  dextrose 5%. 1000 milliLiter(s) IV Continuous <Continuous>  dextrose Gel 1 Dose(s) Oral once PRN  dextrose 50% Injectable 12.5 Gram(s) IV Push once  dextrose 50% Injectable 25 Gram(s) IV Push once  dextrose 50% Injectable 25 Gram(s) IV Push once  glucagon  Injectable 1 milliGRAM(s) IntraMuscular once PRN  lidocaine   Patch 1 Patch Transdermal daily  acetaminophen   Tablet 650 milliGRAM(s) Oral every 6 hours PRN  buPROPion XL . 300 milliGRAM(s) Oral daily  diphenhydrAMINE   Capsule 25 milliGRAM(s) Oral once PRN  ondansetron Injectable 4 milliGRAM(s) IV Push every 6 hours PRN  benzocaine 15 mG/menthol 3.6 mG Lozenge 1 Lozenge Oral every 6 hours PRN  dextrose 5% + sodium chloride 0.45% with potassium chloride 20 mEq/L 1000 milliLiter(s) IV Continuous <Continuous>  pantoprazole    Tablet 40 milliGRAM(s) Oral before breakfast    aspirin enteric coated 81 milliGRAM(s) Oral daily  ATENolol  Tablet 50 milliGRAM(s) Oral daily  insulin lispro (HumaLOG) corrective regimen sliding scale   SubCutaneous three times a day before meals  dextrose 5%. 1000 milliLiter(s) IV Continuous <Continuous>  dextrose Gel 1 Dose(s) Oral once PRN  dextrose 50% Injectable 12.5 Gram(s) IV Push once  dextrose 50% Injectable 25 Gram(s) IV Push once  dextrose 50% Injectable 25 Gram(s) IV Push once  glucagon  Injectable 1 milliGRAM(s) IntraMuscular once PRN  lidocaine   Patch 1 Patch Transdermal daily  acetaminophen   Tablet 650 milliGRAM(s) Oral every 6 hours PRN  buPROPion XL . 300 milliGRAM(s) Oral daily  meropenem IVPB 500 milliGRAM(s) IV Intermittent every 8 hours  rivaroxaban 20 milliGRAM(s) Oral <User Schedule>  diphenhydrAMINE   Capsule 25 milliGRAM(s) Oral once PRN  ondansetron Injectable 4 milliGRAM(s) IV Push every 6 hours PRN  benzocaine 15 mG/menthol 3.6 mG Lozenge 1 Lozenge Oral every 6 hours PRN  dextrose 5% + sodium chloride 0.45% with potassium chloride 20 mEq/L 1000 milliLiter(s) IV Continuous <Continuous>  losartan 100 milliGRAM(s) Oral daily  pantoprazole    Tablet 40 milliGRAM(s) Oral before breakfast    Drug Dosing Weight  Height (cm): 182.88 (12 Aug 2017 10:02)  Weight (kg): 83.9 (12 Aug 2017 18:00)  BMI (kg/m2): 25.1 (12 Aug 2017 18:00)  BSA (m2): 2.06 (12 Aug 2017 18:00)        SCALES: [ ] YES [ ] NO    DATE INSERTED:  REMOVE:  [ ] YES [ ] NO  EXPLAIN:      PAST MEDICAL & SURGICAL HISTORY:  Diabetes  HTN (hypertension)      REVIEW OF SYSTEMS:    CONSTITUTIONAL: No fever, weight loss, or fatigue  EYES: No eye pain, visual disturbances, or discharge  ENMT:  No difficulty hearing, tinnitus, vertigo; No sinus or throat pain  NECK: No pain or stiffness  BREASTS: No pain, masses, or nipple discharge  RESPIRATORY: No cough, wheezing, chills or hemoptysis; No shortness of breath  CARDIOVASCULAR: No chest pain, palpitations, dizziness, or leg swelling  GASTROINTESTINAL: No abdominal or epigastric pain. No nausea, vomiting, or hematemesis; No diarrhea or constipation. No melena or hematochezia.  GENITOURINARY: No dysuria, frequency, hematuria, or incontinence  NEUROLOGICAL: No headaches, memory loss, loss of strength, numbness, or tremors  SKIN: No itching, burning, rashes, or lesions   LYMPH NODES: No enlarged glands  ENDOCRINE: No heat or cold intolerance; No hair loss  MUSCULOSKELETAL: No joint pain or swelling; No muscle, back, or extremity pain  PSYCHIATRIC: No depression, anxiety, mood swings, or difficulty sleeping  HEME/LYMPH: No easy bruising, or bleeding gums  ALLERGY AND IMMUNOLOGIC: No hives or eczema      T(C): 37.1 (08-21-17 @ 05:28), Max: 37.2 (08-21-17 @ 00:25)  HR: 66 (08-21-17 @ 05:28) (63 - 66)  BP: 152/91 (08-21-17 @ 05:28) (150/88 - 166/94)  RR: 17 (08-21-17 @ 05:28) (17 - 18)  SpO2: 96% (08-21-17 @ 05:28) (94% - 96%)  Wt(kg): --        I&O's Detail    20 Aug 2017 07:01  -  21 Aug 2017 07:00  --------------------------------------------------------  IN:    dextrose 5% + sodium chloride 0.45% with potassium chloride 20 mEq/L: 1500 mL    Solution: 200 mL  Total IN: 1700 mL    OUT:  Total OUT: 0 mL    Total NET: 1700 mL            PHYSICAL EXAM:    GENERAL: NAD, well-groomed, well-developed  HEAD:  Atraumatic, Normocephalic  EYES: EOMI, PERRLA, conjunctiva and sclera clear  ENMT: No tonsillar erythema, exudates, or enlargement; Moist mucous membranes, Good dentition, No lesions  NECK: Supple, No JVD, Normal thyroid  NERVOUS SYSTEM:  Alert & Oriented X3, Good concentration; Motor Strength 5/5 B/L upper and lower extremities; DTRs 2+ intact and symmetric  CHEST/LUNG: Clear to percussion bilaterally; No rales, rhonchi, wheezing, or rubs  HEART: Regular rate and rhythm; No murmurs, rubs, or gallops  ABDOMEN: Soft, Nontender, Nondistended; Bowel sounds present  EXTREMITIES:  2+ Peripheral Pulses, No clubbing, cyanosis, or edema  LYMPH: No lymphadenopathy noted  SKIN: No rashes or lesions      LABS:  CBC Full  -  ( 20 Aug 2017 07:49 )  WBC Count : 9.8 K/uL  Hemoglobin : 14.0 g/dL  Hematocrit : 40.6 %  Platelet Count - Automated : 378 K/uL  Mean Cell Volume : 91.2 fl  Mean Cell Hemoglobin : 31.4 pg  Mean Cell Hemoglobin Concentration : 34.5 gm/dL  Auto Neutrophil # : x  Auto Lymphocyte # : x  Auto Monocyte # : x  Auto Eosinophil # : x  Auto Basophil # : x  Auto Neutrophil % : x  Auto Lymphocyte % : x  Auto Monocyte % : x  Auto Eosinophil % : x  Auto Basophil % : x    08-20    145  |  108  |  21  ----------------------------<  170<H>  3.8   |  27  |  0.64    Ca    8.1<L>      20 Aug 2017 07:49  Phos  1.9     08-20  Mg     2.8     08-20    TPro  6.6  /  Alb  2.5<L>  /  TBili  0.5  /  DBili  x   /  AST  20  /  ALT  43  /  AlkPhos  52  08-20            RADIOLOGY & ADDITIONAL STUDIES:    < from: Xray Abdomen Multiple Views (08.21.17 @ 10:54) >    EXAM:  ABD COMP DECUB & OR ERECT                            PROCEDURE DATE:  08/21/2017          INTERPRETATION:  CLINICAL HISTORY: Partial small bowel obstruction.    TECHNIQUE: Supine and upright views of the abdomen dated 8/21/2017.    COMPARISON: Similar views of the abdomen from 8/19/2017    FINDINGS:  There has been interval removal of previously seen nasogastric tube.   Decreased small bowel distention with mild residual small bowel   dilatation in the central abdomen. Air, fecal material and oral contrast   is seen within the colon, particularly the left side and rectum.  There is no evidence of free intraperitoneal air.  There are no abnormal calcifications or organomegaly.  The psoas, renal, liver, and spleen shadows are unremarkable.    The visualized osseous structures are unremarkable.    IMPRESSION:   Decreased small bowel dilatation.     < end of copied text > [facility]...

## 2025-07-09 NOTE — PROGRESS NOTE BEHAVIORAL HEALTH - NSBHCHARTREVIEWVS_PSY_A_CORE FT
July 9, 2025      Henok Moran  [address] [facility] [address]      Delia Whiting,    Thank you for taking the time to talk with me.  During our call, we talked about ways to lower your health risks and reach your goals for healthy living.       I have included these materials; please review before our next call:      Advanced Care Planning doucments    You can also call me at .      I am available Monday through Friday 8:00 AM to 4:00 PM.      If I'm not able to take your call, you can leave a confidential voicemail message. I will call you back as soon as possible.  Be sure to save my phone number, so you know it's me calling.    I look forward to partnering with you.    Sincerely,       Shannan Hernandez RN        Enclosure: Advance Care Plan Docs    
reviewed
HR 70 /85 RR 16 T 98.2 Sp02 97%